# Patient Record
Sex: FEMALE | Race: WHITE | Employment: FULL TIME | URBAN - METROPOLITAN AREA
[De-identification: names, ages, dates, MRNs, and addresses within clinical notes are randomized per-mention and may not be internally consistent; named-entity substitution may affect disease eponyms.]

---

## 2017-01-02 DIAGNOSIS — F43.21 ADJUSTMENT DISORDER WITH DEPRESSED MOOD: ICD-10-CM

## 2017-01-03 RX ORDER — FLUOXETINE HYDROCHLORIDE 40 MG/1
CAPSULE ORAL
Qty: 90 CAPSULE | Refills: 0 | Status: SHIPPED | OUTPATIENT
Start: 2017-01-03 | End: 2017-05-24 | Stop reason: SDUPTHER

## 2017-04-18 ENCOUNTER — EMPLOYEE WELLNESS (OUTPATIENT)
Dept: OTHER | Age: 57
End: 2017-04-18

## 2017-04-18 LAB
CHOLESTEROL, TOTAL: 210 MG/DL (ref 0–199)
FASTING: YES
GLUCOSE BLD-MCNC: 122 MG/DL (ref 74–109)
HDLC SERPL-MCNC: 71 MG/DL (ref 40–90)
LDL CHOLESTEROL CALCULATED: 123 MG/DL
TRIGL SERPL-MCNC: 80 MG/DL (ref 0–199)

## 2017-05-11 DIAGNOSIS — F43.21 ADJUSTMENT DISORDER WITH DEPRESSED MOOD: ICD-10-CM

## 2017-05-11 RX ORDER — FLUOXETINE HYDROCHLORIDE 40 MG/1
CAPSULE ORAL
Qty: 90 CAPSULE | Refills: 0 | OUTPATIENT
Start: 2017-05-11

## 2017-05-24 RX ORDER — FLUOXETINE HYDROCHLORIDE 40 MG/1
CAPSULE ORAL
Qty: 30 CAPSULE | Refills: 0 | Status: SHIPPED | OUTPATIENT
Start: 2017-05-24 | End: 2017-05-31 | Stop reason: SDUPTHER

## 2017-05-31 ENCOUNTER — TELEPHONE (OUTPATIENT)
Dept: FAMILY MEDICINE CLINIC | Age: 57
End: 2017-05-31

## 2017-05-31 ENCOUNTER — OFFICE VISIT (OUTPATIENT)
Dept: FAMILY MEDICINE CLINIC | Age: 57
End: 2017-05-31

## 2017-05-31 VITALS
HEART RATE: 104 BPM | BODY MASS INDEX: 37.56 KG/M2 | DIASTOLIC BLOOD PRESSURE: 64 MMHG | WEIGHT: 220 LBS | RESPIRATION RATE: 16 BRPM | SYSTOLIC BLOOD PRESSURE: 116 MMHG | HEIGHT: 64 IN

## 2017-05-31 DIAGNOSIS — M75.40 IMPINGEMENT SYNDROME, SHOULDER, UNSPECIFIED LATERALITY: ICD-10-CM

## 2017-05-31 DIAGNOSIS — R73.01 IFG (IMPAIRED FASTING GLUCOSE): ICD-10-CM

## 2017-05-31 DIAGNOSIS — K21.9 GASTROESOPHAGEAL REFLUX DISEASE, ESOPHAGITIS PRESENCE NOT SPECIFIED: ICD-10-CM

## 2017-05-31 DIAGNOSIS — M25.512 BILATERAL SHOULDER PAIN, UNSPECIFIED CHRONICITY: Primary | ICD-10-CM

## 2017-05-31 DIAGNOSIS — M25.511 BILATERAL SHOULDER PAIN, UNSPECIFIED CHRONICITY: Primary | ICD-10-CM

## 2017-05-31 DIAGNOSIS — R06.2 WHEEZING: ICD-10-CM

## 2017-05-31 DIAGNOSIS — G47.33 OSA (OBSTRUCTIVE SLEEP APNEA): ICD-10-CM

## 2017-05-31 DIAGNOSIS — F43.21 ADJUSTMENT DISORDER WITH DEPRESSED MOOD: ICD-10-CM

## 2017-05-31 DIAGNOSIS — K21.9 GASTROESOPHAGEAL REFLUX DISEASE WITHOUT ESOPHAGITIS: ICD-10-CM

## 2017-05-31 LAB — HBA1C MFR BLD: 5.9 %

## 2017-05-31 PROCEDURE — 83036 HEMOGLOBIN GLYCOSYLATED A1C: CPT | Performed by: FAMILY MEDICINE

## 2017-05-31 PROCEDURE — 99214 OFFICE O/P EST MOD 30 MIN: CPT | Performed by: FAMILY MEDICINE

## 2017-05-31 RX ORDER — FLUOXETINE HYDROCHLORIDE 40 MG/1
CAPSULE ORAL
Qty: 90 CAPSULE | Refills: 3 | Status: SHIPPED | OUTPATIENT
Start: 2017-05-31 | End: 2018-05-30 | Stop reason: SDUPTHER

## 2017-05-31 RX ORDER — OMEPRAZOLE 40 MG/1
CAPSULE, DELAYED RELEASE ORAL
Qty: 90 CAPSULE | Refills: 3 | Status: SHIPPED | OUTPATIENT
Start: 2017-05-31 | End: 2020-09-18 | Stop reason: ALTCHOICE

## 2017-05-31 ASSESSMENT — PATIENT HEALTH QUESTIONNAIRE - PHQ9
2. FEELING DOWN, DEPRESSED OR HOPELESS: 1
SUM OF ALL RESPONSES TO PHQ9 QUESTIONS 1 & 2: 1
1. LITTLE INTEREST OR PLEASURE IN DOING THINGS: 0
SUM OF ALL RESPONSES TO PHQ QUESTIONS 1-9: 1

## 2017-05-31 ASSESSMENT — ENCOUNTER SYMPTOMS
WHEEZING: 1
GASTROINTESTINAL NEGATIVE: 1

## 2017-07-17 ENCOUNTER — HOSPITAL ENCOUNTER (EMERGENCY)
Age: 57
Discharge: HOME | End: 2017-07-17
Payer: COMMERCIAL

## 2017-07-17 ENCOUNTER — APPOINTMENT (OUTPATIENT)
Dept: GENERAL RADIOLOGY | Age: 57
End: 2017-07-17

## 2017-07-17 VITALS
RESPIRATION RATE: 20 BRPM | TEMPERATURE: 97.8 F | SYSTOLIC BLOOD PRESSURE: 159 MMHG | OXYGEN SATURATION: 98 % | HEART RATE: 90 BPM | DIASTOLIC BLOOD PRESSURE: 88 MMHG
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
## 2017-07-17 PROCEDURE — 99283 EMERGENCY DEPT VISIT LOW MDM: CPT

## 2017-07-17 PROCEDURE — 71101 X-RAY EXAM UNILAT RIBS/CHEST: CPT

## 2017-07-17 ASSESSMENT — PAIN DESCRIPTION - PAIN TYPE: TYPE: ACUTE PAIN

## 2017-07-17 ASSESSMENT — PAIN DESCRIPTION - ORIENTATION: ORIENTATION: RIGHT

## 2017-07-17 ASSESSMENT — PAIN DESCRIPTION - FREQUENCY: FREQUENCY: CONTINUOUS

## 2017-07-17 ASSESSMENT — PAIN DESCRIPTION - LOCATION: LOCATION: RIB CAGE

## 2017-07-17 ASSESSMENT — PAIN SCALES - GENERAL: PAINLEVEL_OUTOF10: 5

## 2017-07-18 ENCOUNTER — CARE COORDINATION (OUTPATIENT)
Dept: FAMILY MEDICINE CLINIC | Age: 57
End: 2017-07-18

## 2017-07-18 ASSESSMENT — ENCOUNTER SYMPTOMS
VOMITING: 0
RHINORRHEA: 0
BACK PAIN: 0
ABDOMINAL PAIN: 0
WHEEZING: 0
SHORTNESS OF BREATH: 0
NAUSEA: 0
COUGH: 0
DIARRHEA: 0
EYE DISCHARGE: 0
SORE THROAT: 0
EYE PAIN: 0

## 2017-07-19 ENCOUNTER — HOSPITAL ENCOUNTER (OUTPATIENT)
Dept: OCCUPATIONAL THERAPY | Age: 57
Setting detail: THERAPIES SERIES
Discharge: HOME | End: 2017-07-19
Payer: COMMERCIAL

## 2017-07-19 PROCEDURE — 97110 THERAPEUTIC EXERCISES: CPT

## 2017-07-19 PROCEDURE — 97035 APP MDLTY 1+ULTRASOUND EA 15: CPT

## 2017-07-19 ASSESSMENT — PAIN DESCRIPTION - LOCATION: LOCATION: SHOULDER

## 2017-07-19 ASSESSMENT — PAIN DESCRIPTION - ORIENTATION: ORIENTATION: RIGHT;LEFT

## 2017-07-19 ASSESSMENT — PAIN SCALES - GENERAL: PAINLEVEL_OUTOF10: 4

## 2017-07-21 ENCOUNTER — HOSPITAL ENCOUNTER (OUTPATIENT)
Dept: OCCUPATIONAL THERAPY | Age: 57
Setting detail: THERAPIES SERIES
Discharge: HOME | End: 2017-07-21
Payer: COMMERCIAL

## 2017-07-21 PROCEDURE — 97035 APP MDLTY 1+ULTRASOUND EA 15: CPT

## 2017-07-21 PROCEDURE — 97110 THERAPEUTIC EXERCISES: CPT

## 2017-07-21 ASSESSMENT — PAIN DESCRIPTION - LOCATION: LOCATION: SHOULDER

## 2017-07-21 ASSESSMENT — PAIN DESCRIPTION - ORIENTATION: ORIENTATION: RIGHT;LEFT

## 2017-07-21 ASSESSMENT — PAIN SCALES - GENERAL: PAINLEVEL_OUTOF10: 2

## 2017-07-26 ENCOUNTER — HOSPITAL ENCOUNTER (OUTPATIENT)
Dept: OCCUPATIONAL THERAPY | Age: 57
Setting detail: THERAPIES SERIES
End: 2017-07-26
Payer: COMMERCIAL

## 2017-07-28 ENCOUNTER — HOSPITAL ENCOUNTER (OUTPATIENT)
Dept: OCCUPATIONAL THERAPY | Age: 57
Setting detail: THERAPIES SERIES
Discharge: HOME | End: 2017-07-28
Payer: COMMERCIAL

## 2017-07-28 PROCEDURE — 97110 THERAPEUTIC EXERCISES: CPT

## 2017-07-28 PROCEDURE — 97035 APP MDLTY 1+ULTRASOUND EA 15: CPT

## 2017-07-28 ASSESSMENT — PAIN DESCRIPTION - LOCATION: LOCATION: SHOULDER

## 2017-07-28 ASSESSMENT — PAIN SCALES - GENERAL: PAINLEVEL_OUTOF10: 2

## 2017-07-28 ASSESSMENT — PAIN DESCRIPTION - ORIENTATION: ORIENTATION: LEFT

## 2017-08-30 ENCOUNTER — HOSPITAL ENCOUNTER (OUTPATIENT)
Dept: OCCUPATIONAL THERAPY | Age: 57
Setting detail: THERAPIES SERIES
Discharge: HOME | End: 2017-08-30
Payer: COMMERCIAL

## 2017-08-30 PROCEDURE — 97110 THERAPEUTIC EXERCISES: CPT

## 2017-09-01 ENCOUNTER — HOSPITAL ENCOUNTER (OUTPATIENT)
Dept: OCCUPATIONAL THERAPY | Age: 57
Discharge: HOME | End: 2017-09-01

## 2018-03-20 VITALS — WEIGHT: 219 LBS | BODY MASS INDEX: 36.44 KG/M2

## 2018-04-03 ENCOUNTER — EMPLOYEE WELLNESS (OUTPATIENT)
Dept: OTHER | Age: 58
End: 2018-04-03

## 2018-04-03 LAB
CHOLESTEROL, TOTAL: 198 MG/DL (ref 0–199)
FASTING: YES
GLUCOSE BLD-MCNC: 128 MG/DL (ref 74–109)
HBA1C MFR BLD: 5.9 % (ref 4.4–6.4)
HDLC SERPL-MCNC: 69 MG/DL (ref 40–90)
LDL CHOLESTEROL CALCULATED: 107 MG/DL
TRIGL SERPL-MCNC: 108 MG/DL (ref 0–199)

## 2018-04-10 VITALS — BODY MASS INDEX: 38.49 KG/M2 | WEIGHT: 226 LBS

## 2018-04-25 ENCOUNTER — OFFICE VISIT (OUTPATIENT)
Dept: FAMILY MEDICINE CLINIC | Age: 58
End: 2018-04-25
Payer: COMMERCIAL

## 2018-04-25 VITALS
SYSTOLIC BLOOD PRESSURE: 100 MMHG | BODY MASS INDEX: 39.27 KG/M2 | OXYGEN SATURATION: 96 % | DIASTOLIC BLOOD PRESSURE: 66 MMHG | HEIGHT: 64 IN | WEIGHT: 230 LBS | HEART RATE: 88 BPM | RESPIRATION RATE: 16 BRPM

## 2018-04-25 DIAGNOSIS — M53.3 SACROILIAC PAIN: Primary | ICD-10-CM

## 2018-04-25 DIAGNOSIS — M25.551 BILATERAL HIP PAIN: ICD-10-CM

## 2018-04-25 DIAGNOSIS — M25.552 BILATERAL HIP PAIN: ICD-10-CM

## 2018-04-25 DIAGNOSIS — G89.29 CHRONIC PAIN OF BOTH KNEES: ICD-10-CM

## 2018-04-25 DIAGNOSIS — M25.562 CHRONIC PAIN OF BOTH KNEES: ICD-10-CM

## 2018-04-25 DIAGNOSIS — M25.561 CHRONIC PAIN OF BOTH KNEES: ICD-10-CM

## 2018-04-25 DIAGNOSIS — G57.00 PIRIFORMIS SYNDROME, UNSPECIFIED LATERALITY: ICD-10-CM

## 2018-04-25 PROCEDURE — G8427 DOCREV CUR MEDS BY ELIG CLIN: HCPCS | Performed by: FAMILY MEDICINE

## 2018-04-25 PROCEDURE — 3017F COLORECTAL CA SCREEN DOC REV: CPT | Performed by: FAMILY MEDICINE

## 2018-04-25 PROCEDURE — G8417 CALC BMI ABV UP PARAM F/U: HCPCS | Performed by: FAMILY MEDICINE

## 2018-04-25 PROCEDURE — 1036F TOBACCO NON-USER: CPT | Performed by: FAMILY MEDICINE

## 2018-04-25 PROCEDURE — 99214 OFFICE O/P EST MOD 30 MIN: CPT | Performed by: FAMILY MEDICINE

## 2018-04-25 RX ORDER — CELECOXIB 200 MG/1
200 CAPSULE ORAL DAILY
Qty: 30 CAPSULE | Refills: 1 | Status: SHIPPED | OUTPATIENT
Start: 2018-04-25 | End: 2018-05-30 | Stop reason: SDUPTHER

## 2018-04-25 ASSESSMENT — ENCOUNTER SYMPTOMS
BACK PAIN: 1
RESPIRATORY NEGATIVE: 1
GASTROINTESTINAL NEGATIVE: 1

## 2018-04-26 ENCOUNTER — HOSPITAL ENCOUNTER (OUTPATIENT)
Dept: GENERAL RADIOLOGY | Age: 58
Discharge: HOME | End: 2018-04-26
Payer: COMMERCIAL

## 2018-04-26 ENCOUNTER — HOSPITAL ENCOUNTER (OUTPATIENT)
Age: 58
Discharge: HOME | End: 2018-04-26
Payer: COMMERCIAL

## 2018-04-26 DIAGNOSIS — G89.29 CHRONIC PAIN OF BOTH KNEES: ICD-10-CM

## 2018-04-26 DIAGNOSIS — M25.561 CHRONIC PAIN OF BOTH KNEES: ICD-10-CM

## 2018-04-26 DIAGNOSIS — M25.562 CHRONIC PAIN OF BOTH KNEES: ICD-10-CM

## 2018-04-26 DIAGNOSIS — M53.3 SACROILIAC PAIN: ICD-10-CM

## 2018-04-26 PROCEDURE — 73565 X-RAY EXAM OF KNEES: CPT

## 2018-04-26 PROCEDURE — 72100 X-RAY EXAM L-S SPINE 2/3 VWS: CPT

## 2018-05-30 DIAGNOSIS — M25.562 CHRONIC PAIN OF BOTH KNEES: ICD-10-CM

## 2018-05-30 DIAGNOSIS — M25.561 CHRONIC PAIN OF BOTH KNEES: ICD-10-CM

## 2018-05-30 DIAGNOSIS — G89.29 CHRONIC PAIN OF BOTH KNEES: ICD-10-CM

## 2018-05-30 DIAGNOSIS — F43.21 ADJUSTMENT DISORDER WITH DEPRESSED MOOD: ICD-10-CM

## 2018-05-30 RX ORDER — CELECOXIB 200 MG/1
200 CAPSULE ORAL DAILY
Qty: 90 CAPSULE | Refills: 3 | Status: SHIPPED | OUTPATIENT
Start: 2018-05-30 | End: 2019-08-08 | Stop reason: SDUPTHER

## 2018-05-30 RX ORDER — FLUOXETINE HYDROCHLORIDE 40 MG/1
CAPSULE ORAL
Qty: 90 CAPSULE | Refills: 3 | Status: SHIPPED | OUTPATIENT
Start: 2018-05-30 | End: 2019-11-18 | Stop reason: SDUPTHER

## 2018-06-06 ENCOUNTER — HOSPITAL ENCOUNTER (OUTPATIENT)
Dept: PHYSICAL THERAPY | Age: 58
Setting detail: THERAPIES SERIES
Discharge: HOME | End: 2018-06-06
Payer: COMMERCIAL

## 2018-06-06 PROCEDURE — 97162 PT EVAL MOD COMPLEX 30 MIN: CPT

## 2018-06-06 ASSESSMENT — PAIN DESCRIPTION - LOCATION: LOCATION: BACK;KNEE;HIP

## 2018-06-06 ASSESSMENT — PAIN DESCRIPTION - ORIENTATION: ORIENTATION: RIGHT;LEFT

## 2018-06-06 ASSESSMENT — PAIN SCALES - GENERAL: PAINLEVEL_OUTOF10: 4

## 2018-06-13 ENCOUNTER — HOSPITAL ENCOUNTER (OUTPATIENT)
Dept: PHYSICAL THERAPY | Age: 58
Setting detail: THERAPIES SERIES
Discharge: HOME | End: 2018-06-13
Payer: COMMERCIAL

## 2018-06-13 PROCEDURE — 97110 THERAPEUTIC EXERCISES: CPT

## 2018-06-13 ASSESSMENT — PAIN DESCRIPTION - LOCATION: LOCATION: BACK

## 2018-06-13 ASSESSMENT — PAIN SCALES - GENERAL: PAINLEVEL_OUTOF10: 3

## 2018-06-13 ASSESSMENT — PAIN DESCRIPTION - PAIN TYPE: TYPE: CHRONIC PAIN

## 2018-06-15 ENCOUNTER — HOSPITAL ENCOUNTER (OUTPATIENT)
Dept: PHYSICAL THERAPY | Age: 58
Setting detail: THERAPIES SERIES
Discharge: HOME | End: 2018-06-15
Payer: COMMERCIAL

## 2018-06-15 PROCEDURE — 97110 THERAPEUTIC EXERCISES: CPT

## 2018-06-15 ASSESSMENT — PAIN SCALES - GENERAL: PAINLEVEL_OUTOF10: 2

## 2018-06-15 ASSESSMENT — PAIN DESCRIPTION - ORIENTATION: ORIENTATION: RIGHT;LEFT

## 2018-06-15 ASSESSMENT — PAIN DESCRIPTION - LOCATION: LOCATION: KNEE

## 2018-06-15 ASSESSMENT — PAIN DESCRIPTION - PAIN TYPE: TYPE: CHRONIC PAIN

## 2018-06-20 ENCOUNTER — HOSPITAL ENCOUNTER (OUTPATIENT)
Dept: PHYSICAL THERAPY | Age: 58
Setting detail: THERAPIES SERIES
Discharge: HOME | End: 2018-06-20
Payer: COMMERCIAL

## 2018-06-20 PROCEDURE — 97110 THERAPEUTIC EXERCISES: CPT

## 2018-06-20 ASSESSMENT — PAIN SCALES - GENERAL: PAINLEVEL_OUTOF10: 2

## 2018-06-22 ENCOUNTER — HOSPITAL ENCOUNTER (OUTPATIENT)
Dept: PHYSICAL THERAPY | Age: 58
Setting detail: THERAPIES SERIES
Discharge: HOME | End: 2018-06-22
Payer: COMMERCIAL

## 2018-06-27 ENCOUNTER — HOSPITAL ENCOUNTER (OUTPATIENT)
Dept: PHYSICAL THERAPY | Age: 58
Setting detail: THERAPIES SERIES
Discharge: HOME | End: 2018-06-27
Payer: COMMERCIAL

## 2018-06-27 PROCEDURE — 97110 THERAPEUTIC EXERCISES: CPT

## 2018-06-29 ENCOUNTER — HOSPITAL ENCOUNTER (OUTPATIENT)
Dept: PHYSICAL THERAPY | Age: 58
Setting detail: THERAPIES SERIES
Discharge: HOME | End: 2018-06-29
Payer: COMMERCIAL

## 2018-06-29 PROCEDURE — 97110 THERAPEUTIC EXERCISES: CPT

## 2018-07-06 ENCOUNTER — HOSPITAL ENCOUNTER (OUTPATIENT)
Dept: PHYSICAL THERAPY | Age: 58
Setting detail: THERAPIES SERIES
Discharge: HOME | End: 2018-07-06
Payer: COMMERCIAL

## 2018-07-06 PROCEDURE — 97110 THERAPEUTIC EXERCISES: CPT

## 2018-07-06 ASSESSMENT — PAIN DESCRIPTION - LOCATION: LOCATION: GENERALIZED

## 2018-07-06 ASSESSMENT — PAIN SCALES - GENERAL: PAINLEVEL_OUTOF10: 1

## 2018-07-06 ASSESSMENT — PAIN DESCRIPTION - PAIN TYPE: TYPE: CHRONIC PAIN

## 2018-07-06 NOTE — PROGRESS NOTES
with abdominal bracing. Exercise 10: Rockerboard forward/back, left/right x15 each; balance 15 seconds   Exercise 11: HydroChest level 3 x15  Exercise 12: Lunges onto BOSU forward x10 bilateral with bracing. Exercise 13: added tandem standing on blue foam for 15 sec. X2  Exercise 14: standing HS stretch at steps held 15 sec. X3, quad stretch at steps held 15 sec. X3         Activity Tolerance:  Activity Tolerance: Patient Tolerated treatment well    Assessment: Body structures, Functions, Activity limitations: Decreased functional mobility , Decreased ADL status, Decreased ROM, Decreased strength, Decreased endurance  Assessment: Added advance balance activities this date with good tolerance. Pt continues to work on posture during all ex. this date. Pt educated to monitor for any increase in pain after session. Prognosis: Good  Discharge Recommendations: Continue to assess pending progress    Patient Education:  Patient Education: balance, abdominal bracing, posture, stretches                      Plan:  Times per week: 2-3 times per week  Plan weeks: 12 weeks  Specific instructions for Next Treatment: Core, hips, legs stretches and strengthening, manual therapy as needed, modalities as needed, balance, ambulation  Current Treatment Recommendations: Strengthening, ROM, Balance Training, Endurance Training, Stair training, Pain Management, Positioning, Modalities, Aquatics, Gait Training, Transfer Training, Home Exercise Program, Functional Mobility Training  Plan Comment: Continue with current POC. Goals:  Patient goals : \"I want my knees to feel better. I want to be able to do more things with my grandchildren. \"    Short term goals  Time Frame for Short term goals: 4 weeks  Short term goal 1: Improve Nathalie Lipps to 15/24 or less to assist with self care. Short term goal 2: Decrease pain in back, knees, right hip to 2/10 to assist with sleeping at night.   Short term goal 3: Improve core and leg strength to 5/5 to assist with carrying grandchildren. Short term goal 4: Improve posture needing no cues for correction during therapy to assist with preventing injury. Short term goal 5: Increase bilateral knee ROM to 1-128 degrees to assist with ambulation. Long term goals  Time Frame for Long term goals : 12 weeks  Long term goal 1: Independent with HEP and with progression to assist with decreasing pain.            Becky Aguila

## 2018-07-11 ENCOUNTER — HOSPITAL ENCOUNTER (OUTPATIENT)
Dept: PHYSICAL THERAPY | Age: 58
Setting detail: THERAPIES SERIES
Discharge: HOME | End: 2018-07-11
Payer: COMMERCIAL

## 2018-07-11 PROCEDURE — 97110 THERAPEUTIC EXERCISES: CPT

## 2018-07-11 ASSESSMENT — PAIN DESCRIPTION - DIRECTION: RADIATING_TOWARDS: RIGHT POSTERIOR KNEE

## 2018-07-11 ASSESSMENT — PAIN DESCRIPTION - PAIN TYPE: TYPE: CHRONIC PAIN

## 2018-07-11 ASSESSMENT — PAIN SCALES - GENERAL: PAINLEVEL_OUTOF10: 1

## 2018-07-11 ASSESSMENT — PAIN DESCRIPTION - ORIENTATION: ORIENTATION: RIGHT

## 2018-07-11 ASSESSMENT — PAIN DESCRIPTION - LOCATION: LOCATION: KNEE

## 2018-07-11 NOTE — PROGRESS NOTES
assist with preventing injury. Short term goal 5: Increase bilateral knee ROM to 1-128 degrees to assist with ambulation. Long term goals  Time Frame for Long term goals : 12 weeks  Long term goal 1: Independent with HEP and with progression to assist with decreasing pain.       Augustine Membreno  PTA [id number]

## 2018-07-13 ENCOUNTER — HOSPITAL ENCOUNTER (OUTPATIENT)
Dept: PHYSICAL THERAPY | Age: 58
Setting detail: THERAPIES SERIES
Discharge: HOME | End: 2018-07-13
Payer: COMMERCIAL

## 2018-07-13 PROCEDURE — 97110 THERAPEUTIC EXERCISES: CPT

## 2018-07-13 NOTE — PROGRESS NOTES
[place]     Time In: 1400  Time Out: 1430  Minutes: 30  Timed Code Treatment Minutes: 30 Minutes     Date: 2018  Patient Name: Jeison Sharp,  Gender:  female        CSN: [id number]   : 1960  (60 y.o.)       Referring Practitioner: Ridge Patient, DO      Diagnosis: M53.3 (ICD-10-CM) - Sacroiliac pain, M25.551, M25.552 (ICD-10-CM) - Bilateral hip pain, M25.561, M25.562, G89.29 (ICD-10-CM) - Chronic pain of both knees, G57.00 (ICD-10-CM) - Piriformis syndrome, unspecified laterality  Treatment Diagnosis: Difficulty with ambulation, knee pain, Lumbago   Additional Pertinent Hx: Obesity, Arthritis                  General:  PT Visit Information  Onset Date: 18  PT Insurance Information: MEDICAL [facility] visits. Aquatics and modalities are covered. Total # of Visits to Date: 9  Progress Note Counter:  for PN               Subjective:  Response To Previous Treatment: Patient with no complaints from previous session. Family / Caregiver Present: No  Comments: Follow up  with doctor as needed     Subjective: Patient reports feeling pretty good. Reports feels about 80% back to normal.       Pain:  Patient Currently in Pain: No         Objective    Other Activities  Other Activities: Re-evaluation performed          [address] [place] [address] [facility] for Low Back Pain   I stay at home most of the time because of the pain in my back. No   I change position frequently to try and make my back comfortable. No   I walk more slowly than usual because of the pain in my back. No   Because of the pain in my back, I am not doing any of the jobs that I usually do around the house. No   Because of the pain in my back, I use a handrail to get upstairs. Yes   Because of the pain in my back, I lie down to rest more often.  No   Because of the pain in my back, I have to hold onto something to get out of a reclining chair. No   Because of the pain in my back, I ask other people to do things for me. No   I get dressed more slowly than usual because of the pain in my back. Yes   I only stand up for short periods of time because of the pain in my back. No   Because of the pain in my back, I try not to bend or kneel down. No   I find it difficult to get out of a chair because of the pain in my back. No   My back hurts most of the time. No   I find it difficult to turn over in bed because of the pain in my back. No   My appetite is not very good because of the pain in my back. No   I have trouble putting on my socks (or stockings) because of the pain in my back. No   I only walk short distances because of the pain in my back. No   I sleep less because of the pain in my back. No   Because of the pain in my back, I get dressed with help from someone else. No   I sit down most of the day because of the pain in my back. No   I avoid heavy jobs around the house because of the pain in my back. No   Because of the pain in my back, I am more irritable and bad tempered with people. No   Because of the pain in my back, I go upstairs more slowly than usual. No   I stay in bed most of the time because of the pain in my back. No   TOTAL NUMBER OF YES RESPONSES 2/24             Activity Tolerance:  Activity Tolerance: Patient Tolerated treatment well    Assessment:  Assessment: Reassessment completed today. Patient is having less pain, improved Praful Inderjit score, increased strength and ROM, and improved posture. Patient in agreement to be discharged at this time and is to continue with HEP.     Prognosis: Good  REQUIRES PT FOLLOW UP: No  Discharge Recommendations: Home independently    Patient Education:  Patient Education: Continue with HEP                      Plan:  Specific instructions for Next Treatment: Discharge  Plan Comment: Patient is being discharged from therapy and is to continue with HEP    Goals:  Patient goals : \"I

## 2019-03-13 ENCOUNTER — EMPLOYEE WELLNESS (OUTPATIENT)
Dept: OTHER | Age: 59
End: 2019-03-13

## 2019-03-13 LAB
CHOLESTEROL, TOTAL: 182 MG/DL (ref 0–199)
FASTING: YES
GLUCOSE BLD-MCNC: 105 MG/DL (ref 74–109)
HDLC SERPL-MCNC: 63 MG/DL (ref 40–90)
LDL CHOLESTEROL CALCULATED: 104 MG/DL
TRIGL SERPL-MCNC: 76 MG/DL (ref 0–199)

## 2019-04-08 VITALS — BODY MASS INDEX: 36.73 KG/M2 | WEIGHT: 214 LBS

## 2019-08-08 ENCOUNTER — OFFICE VISIT (OUTPATIENT)
Dept: FAMILY MEDICINE CLINIC | Age: 59
End: 2019-08-08
Payer: COMMERCIAL

## 2019-08-08 VITALS
HEART RATE: 98 BPM | SYSTOLIC BLOOD PRESSURE: 120 MMHG | BODY MASS INDEX: 38.58 KG/M2 | DIASTOLIC BLOOD PRESSURE: 78 MMHG | RESPIRATION RATE: 14 BRPM | OXYGEN SATURATION: 98 % | WEIGHT: 226 LBS | HEIGHT: 64 IN

## 2019-08-08 DIAGNOSIS — Z82.61 FAMILY HISTORY OF RHEUMATOID ARTHRITIS: ICD-10-CM

## 2019-08-08 DIAGNOSIS — M25.561 ARTHRALGIA OF BOTH KNEES: ICD-10-CM

## 2019-08-08 DIAGNOSIS — M25.542 ARTHRALGIA OF BOTH HANDS: Primary | ICD-10-CM

## 2019-08-08 DIAGNOSIS — Z12.11 SCREENING FOR COLORECTAL CANCER: ICD-10-CM

## 2019-08-08 DIAGNOSIS — Z13.31 POSITIVE DEPRESSION SCREENING: ICD-10-CM

## 2019-08-08 DIAGNOSIS — Z11.59 NEED FOR HEPATITIS C SCREENING TEST: ICD-10-CM

## 2019-08-08 DIAGNOSIS — F51.02 ADJUSTMENT INSOMNIA: ICD-10-CM

## 2019-08-08 DIAGNOSIS — M25.561 CHRONIC PAIN OF BOTH KNEES: ICD-10-CM

## 2019-08-08 DIAGNOSIS — G89.29 CHRONIC PAIN OF BOTH KNEES: ICD-10-CM

## 2019-08-08 DIAGNOSIS — M25.562 ARTHRALGIA OF BOTH KNEES: ICD-10-CM

## 2019-08-08 DIAGNOSIS — Z12.31 ENCOUNTER FOR SCREENING MAMMOGRAM FOR BREAST CANCER: ICD-10-CM

## 2019-08-08 DIAGNOSIS — M25.541 ARTHRALGIA OF BOTH HANDS: Primary | ICD-10-CM

## 2019-08-08 DIAGNOSIS — Z12.12 SCREENING FOR COLORECTAL CANCER: ICD-10-CM

## 2019-08-08 DIAGNOSIS — M25.562 CHRONIC PAIN OF BOTH KNEES: ICD-10-CM

## 2019-08-08 PROCEDURE — 99213 OFFICE O/P EST LOW 20 MIN: CPT | Performed by: NURSE PRACTITIONER

## 2019-08-08 PROCEDURE — G8431 POS CLIN DEPRES SCRN F/U DOC: HCPCS | Performed by: NURSE PRACTITIONER

## 2019-08-08 PROCEDURE — G0444 DEPRESSION SCREEN ANNUAL: HCPCS | Performed by: NURSE PRACTITIONER

## 2019-08-08 RX ORDER — CELECOXIB 200 MG/1
200 CAPSULE ORAL 2 TIMES DAILY
Qty: 180 CAPSULE | Refills: 0 | Status: SHIPPED | OUTPATIENT
Start: 2019-08-08 | End: 2019-11-18 | Stop reason: SDUPTHER

## 2019-08-08 RX ORDER — HYDROXYZINE HYDROCHLORIDE 25 MG/1
25 TABLET, FILM COATED ORAL EVERY 8 HOURS PRN
Qty: 30 TABLET | Refills: 1 | Status: SHIPPED | OUTPATIENT
Start: 2019-08-08 | End: 2019-08-18

## 2019-08-08 ASSESSMENT — PATIENT HEALTH QUESTIONNAIRE - PHQ9
4. FEELING TIRED OR HAVING LITTLE ENERGY: 3
10. IF YOU CHECKED OFF ANY PROBLEMS, HOW DIFFICULT HAVE THESE PROBLEMS MADE IT FOR YOU TO DO YOUR WORK, TAKE CARE OF THINGS AT HOME, OR GET ALONG WITH OTHER PEOPLE: 1
5. POOR APPETITE OR OVEREATING: 0
SUM OF ALL RESPONSES TO PHQ9 QUESTIONS 1 & 2: 6
2. FEELING DOWN, DEPRESSED OR HOPELESS: 3
8. MOVING OR SPEAKING SO SLOWLY THAT OTHER PEOPLE COULD HAVE NOTICED. OR THE OPPOSITE, BEING SO FIGETY OR RESTLESS THAT YOU HAVE BEEN MOVING AROUND A LOT MORE THAN USUAL: 0
6. FEELING BAD ABOUT YOURSELF - OR THAT YOU ARE A FAILURE OR HAVE LET YOURSELF OR YOUR FAMILY DOWN: 3
9. THOUGHTS THAT YOU WOULD BE BETTER OFF DEAD, OR OF HURTING YOURSELF: 1
SUM OF ALL RESPONSES TO PHQ QUESTIONS 1-9: 19
7. TROUBLE CONCENTRATING ON THINGS, SUCH AS READING THE NEWSPAPER OR WATCHING TELEVISION: 3
3. TROUBLE FALLING OR STAYING ASLEEP: 3
SUM OF ALL RESPONSES TO PHQ QUESTIONS 1-9: 19
1. LITTLE INTEREST OR PLEASURE IN DOING THINGS: 3

## 2019-08-08 ASSESSMENT — ENCOUNTER SYMPTOMS
SHORTNESS OF BREATH: 0
NAUSEA: 0
ABDOMINAL PAIN: 0
COUGH: 0

## 2019-08-08 NOTE — PROGRESS NOTES
rash.   Neurological: Negative for dizziness, light-headedness and headaches. Psychiatric/Behavioral: Positive for dysphoric mood and sleep disturbance. Objective:   Physical Exam   Constitutional: Vital signs are normal. No distress. Eyes: Pupils are equal, round, and reactive to light. EOM are normal.   Neck: Normal range of motion. Neck supple. Cardiovascular: Normal rate and regular rhythm. No murmur heard. Pulmonary/Chest: Effort normal and breath sounds normal. She has no wheezes. Abdominal: Soft. Bowel sounds are normal. She exhibits no distension. There is no tenderness. Musculoskeletal:        Right shoulder: She exhibits decreased range of motion and tenderness. Left shoulder: She exhibits decreased range of motion and tenderness. Right knee: She exhibits decreased range of motion. Tenderness found. Left knee: She exhibits decreased range of motion. Tenderness found. Lumbar back: She exhibits decreased range of motion and pain. Right hand: She exhibits tenderness. Left hand: She exhibits tenderness. Skin: Skin is warm and dry. No rash noted. Psychiatric: She is slowed and withdrawn. She exhibits a depressed mood. Assessment:       Diagnosis Orders   1. Arthralgia of both hands  Sedimentation Rate    C-reactive protein    MARCELO    Rheumatoid Factor    CCP Antibodies, IGG/IGA    Janes Taylor MD, Rheumatology, [facility]   2. Arthralgia of both knees  Sedimentation Rate    C-reactive protein    MARCELO    Rheumatoid Factor    CCP Antibodies, IGG/IGA    Janes Taylor MD, Rheumatology, [facility]   3. Family history of rheumatoid arthritis  Janes Taylor MD, Rheumatology, [facility]   4. Positive depression screening  Positive Screen for Clinical Depression with a Documented Follow-up Plan    5. Adjustment insomnia  hydrOXYzine (ATARAX) 25 MG tablet   6. Chronic pain of both knees  celecoxib (CELEBREX) 200 MG capsule   7.  Screening for colorectal cancer  Cologuard (For External Results Only)   8. Encounter for screening mammogram for breast cancer  CASSANDRA Digital Screen Bilateral [[id number]]   9.  Need for hepatitis C screening test  Hepatitis C Antibody           Plan:      Arthritis Panel  Refer to RHEUM  Increase Celebrex BID  Discussion on grieving - hydroxyzine HS for sleep  Continue Prozac  MAMMO ordered  COLOGUARD ordered  RTO if symptoms worsen or stay the same          Con Alvarado, APRN - CNP

## 2019-10-11 ENCOUNTER — TELEPHONE (OUTPATIENT)
Dept: FAMILY MEDICINE CLINIC | Age: 59
End: 2019-10-11

## 2019-11-18 DIAGNOSIS — G89.29 CHRONIC PAIN OF BOTH KNEES: ICD-10-CM

## 2019-11-18 DIAGNOSIS — M25.562 CHRONIC PAIN OF BOTH KNEES: ICD-10-CM

## 2019-11-18 DIAGNOSIS — M25.561 CHRONIC PAIN OF BOTH KNEES: ICD-10-CM

## 2019-11-18 DIAGNOSIS — F43.21 ADJUSTMENT DISORDER WITH DEPRESSED MOOD: ICD-10-CM

## 2019-11-19 RX ORDER — CELECOXIB 200 MG/1
200 CAPSULE ORAL 2 TIMES DAILY
Qty: 180 CAPSULE | Refills: 0 | Status: SHIPPED | OUTPATIENT
Start: 2019-11-19 | End: 2020-03-16

## 2019-11-19 RX ORDER — FLUOXETINE HYDROCHLORIDE 40 MG/1
CAPSULE ORAL
Qty: 90 CAPSULE | Refills: 3 | Status: SHIPPED | OUTPATIENT
Start: 2019-11-19 | End: 2021-01-08

## 2019-11-24 ENCOUNTER — HOSPITAL ENCOUNTER (EMERGENCY)
Age: 59
Discharge: HOME | End: 2019-11-24
Payer: COMMERCIAL

## 2019-11-24 VITALS
BODY MASS INDEX: 33.8 KG/M2 | HEART RATE: 111 BPM | WEIGHT: 198 LBS | TEMPERATURE: 97.8 F | DIASTOLIC BLOOD PRESSURE: 110 MMHG | HEIGHT: 64 IN | SYSTOLIC BLOOD PRESSURE: 145 MMHG | RESPIRATION RATE: 20 BRPM | OXYGEN SATURATION: 98 %

## 2019-11-24 DIAGNOSIS — S81.812A LACERATION OF LEFT LOWER EXTREMITY, INITIAL ENCOUNTER: Primary | ICD-10-CM

## 2019-11-24 PROCEDURE — 12002 RPR S/N/AX/GEN/TRNK2.6-7.5CM: CPT

## 2019-11-24 PROCEDURE — 90471 IMMUNIZATION ADMIN: CPT | Performed by: NURSE PRACTITIONER

## 2019-11-24 PROCEDURE — 99282 EMERGENCY DEPT VISIT SF MDM: CPT

## 2019-11-24 PROCEDURE — 6360000002 HC RX W HCPCS: Performed by: NURSE PRACTITIONER

## 2019-11-24 PROCEDURE — 90715 TDAP VACCINE 7 YRS/> IM: CPT | Performed by: NURSE PRACTITIONER

## 2019-11-24 RX ORDER — LIDOCAINE HYDROCHLORIDE AND EPINEPHRINE 10; 10 MG/ML; UG/ML
INJECTION, SOLUTION INFILTRATION; PERINEURAL
Status: DISCONTINUED
Start: 2019-11-24 | End: 2019-11-25 | Stop reason: HOSPADM

## 2019-11-24 RX ORDER — CEPHALEXIN 500 MG/1
500 CAPSULE ORAL 2 TIMES DAILY
Qty: 14 CAPSULE | Refills: 0 | Status: SHIPPED | OUTPATIENT
Start: 2019-11-24 | End: 2019-12-01

## 2019-11-24 RX ADMIN — TETANUS TOXOID, REDUCED DIPHTHERIA TOXOID AND ACELLULAR PERTUSSIS VACCINE, ADSORBED 0.5 ML: 5; 2.5; 8; 8; 2.5 SUSPENSION INTRAMUSCULAR at 21:38

## 2019-11-24 ASSESSMENT — ENCOUNTER SYMPTOMS: COLOR CHANGE: 0

## 2019-11-25 ENCOUNTER — TELEPHONE (OUTPATIENT)
Dept: FAMILY MEDICINE CLINIC | Age: 59
End: 2019-11-25

## 2020-02-10 ENCOUNTER — HOSPITAL ENCOUNTER (OUTPATIENT)
Age: 60
Discharge: HOME | End: 2020-02-10
Payer: COMMERCIAL

## 2020-02-10 LAB
ALBUMIN SERPL-MCNC: 4.2 G/DL (ref 3.5–5.1)
ALP BLD-CCNC: 81 U/L (ref 38–126)
ALT SERPL-CCNC: 24 U/L (ref 11–66)
ANION GAP SERPL CALCULATED.3IONS-SCNC: 12 MEQ/L (ref 8–16)
AST SERPL-CCNC: 24 U/L (ref 5–40)
BASOPHILS # BLD: 0.6 %
BASOPHILS ABSOLUTE: 0 THOU/MM3 (ref 0–0.1)
BILIRUB SERPL-MCNC: 0.7 MG/DL (ref 0.3–1.2)
BUN BLDV-MCNC: 14 MG/DL (ref 7–22)
C-REACTIVE PROTEIN: 0.73 MG/DL (ref 0–1)
CALCIUM SERPL-MCNC: 9.1 MG/DL (ref 8.5–10.5)
CHLORIDE BLD-SCNC: 105 MEQ/L (ref 98–111)
CO2: 22 MEQ/L (ref 23–33)
CREAT SERPL-MCNC: 0.5 MG/DL (ref 0.4–1.2)
EOSINOPHIL # BLD: 3.9 %
EOSINOPHILS ABSOLUTE: 0.2 THOU/MM3 (ref 0–0.4)
ERYTHROCYTE [DISTWIDTH] IN BLOOD BY AUTOMATED COUNT: 13.5 % (ref 11.5–14.5)
ERYTHROCYTE [DISTWIDTH] IN BLOOD BY AUTOMATED COUNT: 42.3 FL (ref 35–45)
GFR SERPL CREATININE-BSD FRML MDRD: > 90 ML/MIN/1.73M2
GLUCOSE BLD-MCNC: 108 MG/DL (ref 70–108)
HCT VFR BLD CALC: 40.9 % (ref 37–47)
HEMOGLOBIN: 12.8 GM/DL (ref 12–16)
HEPATITIS C ANTIBODY: NEGATIVE
IMMATURE GRANS (ABS): 0.01 THOU/MM3 (ref 0–0.07)
IMMATURE GRANULOCYTES: 0.2 %
LYMPHOCYTES # BLD: 34.1 %
LYMPHOCYTES ABSOLUTE: 2.1 THOU/MM3 (ref 1–4.8)
MCH RBC QN AUTO: 26.9 PG (ref 26–33)
MCHC RBC AUTO-ENTMCNC: 31.3 GM/DL (ref 32.2–35.5)
MCV RBC AUTO: 85.9 FL (ref 81–99)
MONOCYTES # BLD: 9.5 %
MONOCYTES ABSOLUTE: 0.6 THOU/MM3 (ref 0.4–1.3)
NUCLEATED RED BLOOD CELLS: 0 /100 WBC
PLATELET # BLD: 276 THOU/MM3 (ref 130–400)
PMV BLD AUTO: 10.8 FL (ref 9.4–12.4)
POTASSIUM SERPL-SCNC: 4 MEQ/L (ref 3.5–5.2)
RBC # BLD: 4.76 MILL/MM3 (ref 4.2–5.4)
RHEUMATOID FACTOR: 11 IU/ML (ref 0–13)
SEDIMENTATION RATE, ERYTHROCYTE: 21 MM/HR (ref 0–20)
SEG NEUTROPHILS: 51.7 %
SEGMENTED NEUTROPHILS ABSOLUTE COUNT: 3.2 THOU/MM3 (ref 1.8–7.7)
SODIUM BLD-SCNC: 139 MEQ/L (ref 135–145)
TOTAL PROTEIN: 7.9 G/DL (ref 6.1–8)
URIC ACID: 5.5 MG/DL (ref 2.4–5.7)
VITAMIN D 25-HYDROXY: 18 NG/ML (ref 30–100)
WBC # BLD: 6.2 THOU/MM3 (ref 4.8–10.8)

## 2020-02-10 PROCEDURE — 86200 CCP ANTIBODY: CPT

## 2020-02-10 PROCEDURE — 84550 ASSAY OF BLOOD/URIC ACID: CPT

## 2020-02-10 PROCEDURE — 86038 ANTINUCLEAR ANTIBODIES: CPT

## 2020-02-10 PROCEDURE — 86803 HEPATITIS C AB TEST: CPT

## 2020-02-10 PROCEDURE — 36415 COLL VENOUS BLD VENIPUNCTURE: CPT

## 2020-02-10 PROCEDURE — 80053 COMPREHEN METABOLIC PANEL: CPT

## 2020-02-10 PROCEDURE — 86140 C-REACTIVE PROTEIN: CPT

## 2020-02-10 PROCEDURE — 86430 RHEUMATOID FACTOR TEST QUAL: CPT

## 2020-02-10 PROCEDURE — 85651 RBC SED RATE NONAUTOMATED: CPT

## 2020-02-10 PROCEDURE — 82306 VITAMIN D 25 HYDROXY: CPT

## 2020-02-10 PROCEDURE — 85025 COMPLETE CBC W/AUTO DIFF WBC: CPT

## 2020-02-11 LAB — CYCLIC CITRULLINATED PEPTIDE ANTIBODY IGG: < 1.5 U/ML

## 2020-02-12 LAB — ANA SCREEN: NORMAL

## 2020-03-16 RX ORDER — CELECOXIB 200 MG/1
CAPSULE ORAL
Qty: 180 CAPSULE | Refills: 0 | Status: SHIPPED | OUTPATIENT
Start: 2020-03-16 | End: 2020-07-06

## 2020-09-18 ENCOUNTER — HOSPITAL ENCOUNTER (EMERGENCY)
Age: 60
Discharge: HOME | End: 2020-09-18
Attending: EMERGENCY MEDICINE
Payer: COMMERCIAL

## 2020-09-18 VITALS
TEMPERATURE: 97.7 F | HEART RATE: 88 BPM | SYSTOLIC BLOOD PRESSURE: 168 MMHG | BODY MASS INDEX: 36.7 KG/M2 | OXYGEN SATURATION: 98 % | DIASTOLIC BLOOD PRESSURE: 84 MMHG | HEIGHT: 64 IN | WEIGHT: 215 LBS | RESPIRATION RATE: 18 BRPM

## 2020-09-18 PROCEDURE — 6360000002 HC RX W HCPCS: Performed by: EMERGENCY MEDICINE

## 2020-09-18 PROCEDURE — 99212 OFFICE O/P EST SF 10 MIN: CPT

## 2020-09-18 PROCEDURE — 99213 OFFICE O/P EST LOW 20 MIN: CPT | Performed by: EMERGENCY MEDICINE

## 2020-09-18 PROCEDURE — 96372 THER/PROPH/DIAG INJ SC/IM: CPT

## 2020-09-18 RX ORDER — METHYLPREDNISOLONE ACETATE 80 MG/ML
80 INJECTION, SUSPENSION INTRA-ARTICULAR; INTRALESIONAL; INTRAMUSCULAR; SOFT TISSUE ONCE
Status: COMPLETED | OUTPATIENT
Start: 2020-09-18 | End: 2020-09-18

## 2020-09-18 RX ORDER — HYDROXYZINE 50 MG/1
50 TABLET, FILM COATED ORAL 4 TIMES DAILY PRN
Qty: 15 TABLET | Refills: 0 | Status: SHIPPED | OUTPATIENT
Start: 2020-09-18 | End: 2021-01-08

## 2020-09-18 RX ORDER — PREDNISONE 20 MG/1
20 TABLET ORAL DAILY
Qty: 14 TABLET | Refills: 0 | Status: SHIPPED | OUTPATIENT
Start: 2020-09-18 | End: 2020-09-30

## 2020-09-18 RX ADMIN — METHYLPREDNISOLONE ACETATE 80 MG: 80 INJECTION, SUSPENSION INTRA-ARTICULAR; INTRALESIONAL; INTRAMUSCULAR; SOFT TISSUE at 18:12

## 2020-09-18 ASSESSMENT — ENCOUNTER SYMPTOMS
WHEEZING: 0
TROUBLE SWALLOWING: 0
VOICE CHANGE: 0
CONSTIPATION: 0
DIARRHEA: 0
BACK PAIN: 0
SORE THROAT: 0
EYE REDNESS: 0
ABDOMINAL PAIN: 0
EYE PAIN: 0
COUGH: 0
BLOOD IN STOOL: 0
CHOKING: 0
STRIDOR: 0
SHORTNESS OF BREATH: 0
FACIAL SWELLING: 0
EYE DISCHARGE: 0
VOMITING: 0
NAUSEA: 0
SINUS PRESSURE: 0

## 2020-09-18 NOTE — ED TRIAGE NOTES
Patient to room with c/o red, raised, itchy rash to bilateral arms, face, ears, and neck beginning yesterday. States possible exposure to poison ivy, while cutting weeds, two days ago.

## 2020-09-18 NOTE — ED PROVIDER NOTES
[phone]       Chief Complaint   Patient presents with    Rash     (B) arms, face       Nurses Notes reviewed and I agree except as noted in the HPI. HISTORY OF PRESENT ILLNESS   Misael Harris is a 63 y.o. female who presents with 24-hour history of itchy rash on face, extremities and trunk. Patient exposed to plant allergens in her yard. No fever, chest pain, shortness of breath, stridor, wheezing, abdominal pain, vomiting, painful or swollen glands, purulent discharge. No history of asthma or diabetes. Non-smoker  REVIEW OF SYSTEMS     Review of Systems   Constitutional: Negative for appetite change, chills, fatigue, fever and unexpected weight change. HENT: Negative for congestion, ear discharge, ear pain, facial swelling, hearing loss, nosebleeds, postnasal drip, sinus pressure, sore throat, trouble swallowing and voice change. Eyes: Negative for pain, discharge, redness and visual disturbance. Respiratory: Negative for cough, choking, shortness of breath, wheezing and stridor. Cardiovascular: Negative for chest pain and leg swelling. Gastrointestinal: Negative for abdominal pain, blood in stool, constipation, diarrhea, nausea and vomiting. Genitourinary: Negative for dysuria, flank pain, frequency, hematuria, urgency, vaginal bleeding and vaginal discharge. Musculoskeletal: Negative for arthralgias, back pain, neck pain and neck stiffness. Skin: Positive for rash. Itchy rash face trunk and extremities   Neurological: Negative for dizziness, seizures, syncope, weakness, light-headedness and headaches. Hematological: Negative for adenopathy. Does not bruise/bleed easily. Psychiatric/Behavioral: Negative for confusion, sleep disturbance and suicidal ideas. The patient is not nervous/anxious. All other systems reviewed and are negative.       PAST MEDICAL HISTORY         Diagnosis Date    GERD (gastroesophageal reflux disease) 2/5/2013    Obesity     Restless legs syndrome     Unspecified sleep apnea        SURGICAL HISTORY     Patient  has a past surgical history that includes Tubal ligation (); Foot surgery; and Endometrial ablation. CURRENT MEDICATIONS       Previous Medications    CELECOXIB (CELEBREX) 200 MG CAPSULE    TAKE 1 CAPSULE BY MOUTH TWICE A DAY    FLUOXETINE (PROZAC) 40 MG CAPSULE    TAKE 1 CAPSULE BY MOUTH DAILY. LORATADINE (CLARITIN) 10 MG TABLET    Take 1 tablet by mouth daily. MULTIPLE VITAMIN PO    Take 1 tablet by mouth daily. ALLERGIES     Patient is is allergic to sulfa antibiotics. FAMILY HISTORY     Patient'sfamily history includes Arthritis in her mother; Diabetes in her father; High Blood Pressure in her father; Stroke in her father and mother. SOCIAL HISTORY     Patient  reports that she has never smoked. She has never used smokeless tobacco. She reports current alcohol use. She reports that she does not use drugs. PHYSICAL EXAM     ED TRIAGE VITALS  BP: (!) 175/84, Temp: 97.7 °F (36.5 °C), Pulse: 94, Resp: 20, SpO2: 98 %  Physical Exam  Vitals signs and nursing note reviewed. Constitutional:       General: She is not in acute distress. Appearance: She is well-developed. She is not ill-appearing. Comments: Moist membranes, normal airway   HENT:      Head: Normocephalic and atraumatic. Right Ear: Tympanic membrane and external ear normal.      Left Ear: Tympanic membrane and external ear normal.      Nose: Nose normal.      Mouth/Throat:      Pharynx: No oropharyngeal exudate. Comments: Oropharynx normal  Eyes:      General: No scleral icterus. Right eye: No discharge. Left eye: No discharge. Extraocular Movements:      Right eye: Normal extraocular motion. Left eye: Normal extraocular motion. Conjunctiva/sclera: Conjunctivae normal.      Pupils: Pupils are equal, round, and reactive to light. Comments: Conjunctiva clear   Neck:      Musculoskeletal: Normal range of motion. No spinous process tenderness or muscular tenderness. Thyroid: No thyromegaly. Vascular: No JVD. Cardiovascular:      Rate and Rhythm: Normal rate and regular rhythm. Pulses: Normal pulses. Heart sounds: Normal heart sounds, S1 normal and S2 normal. No murmur. No friction rub. No gallop. Pulmonary:      Effort: Pulmonary effort is normal. No tachypnea or respiratory distress. Breath sounds: Normal breath sounds. No stridor. No wheezing or rales. Comments: No cough, lungs clear throughout  Chest:      Chest wall: No tenderness. Abdominal:      General: Bowel sounds are normal. There is no distension. Palpations: Abdomen is soft. There is no mass. Tenderness: There is no abdominal tenderness. There is no right CVA tenderness, left CVA tenderness, guarding or rebound. Comments: Soft nontender   Musculoskeletal: Normal range of motion. General: No tenderness. Right lower leg: Normal.      Left lower leg: Normal.   Lymphadenopathy:      Cervical: No cervical adenopathy. Right cervical: No superficial cervical adenopathy. Left cervical: No superficial cervical adenopathy. Skin:     General: Skin is warm and dry. Findings: No erythema or rash. Comments: Contact dermatitis face, neck, trunk, extremities without bacterial infection or infestation   Neurological:      Mental Status: She is alert and oriented to person, place, and time. Cranial Nerves: No cranial nerve deficit. Motor: No abnormal muscle tone. Coordination: Coordination normal.      Deep Tendon Reflexes: Reflexes are normal and symmetric. Reflexes normal.      Comments: Appropriate, no focal finding   Psychiatric:         Behavior: Behavior normal.         Thought Content:  Thought content normal.         Judgment: Judgment normal.         DIAGNOSTIC RESULTS   Labs: No results found for this visit on 09/18/20. IMAGING:  No orders to display     URGENT CARE COURSE:     Vitals:    09/18/20 1743   BP: (!) 175/84   Pulse: 94   Resp: 20   Temp: 97.7 °F (36.5 °C)   TempSrc: Temporal   SpO2: 98%   Weight: 215 lb (97.5 kg)   Height: 5' 4\" (1.626 m)       Medications   methylPREDNISolone acetate (DEPO-MEDROL) injection 80 mg (80 mg Intramuscular Given 9/18/20 1812)   Tolerated well  PROCEDURES:  None  FINALIMPRESSION      1. Allergic contact dermatitis due to plant    2. Elevated blood pressure reading        DISPOSITION/PLAN   DISPOSITION Decision To Discharge 09/18/2020 06:02:56 PM  Nontoxic, well-hydrated, normal airway. No anaphylactic or anaphylactoid reaction, angioedema, urticaria, SJS, TEN. No infectious process. No infestation. Patient has allergic contact dermatitis due to plant. Will require systemic and topical corticosteroids and antihistamines. Patient received Depo-Medrol. Will treat with prednisone, Valisone, Atarax, increased oral clear liquids, rest in cool air conditioned space. Patient to recheck with PCP in 7 days if problems persist, and she understands to go to ED if worse                       PATIENT REFERRED TO:  Alexandra Stephen [address] [place] [address]  [phone]    Schedule an appointment as soon as possible for a visit in 7 days  Recheck if problems persist, go to emergency if worse    DISCHARGE MEDICATIONS:  New Prescriptions    BETAMETHASONE VALERATE (VALISONE) 0.1 % CREAM    Apply topically 2 times daily.   Do not apply to face    HYDROXYZINE (ATARAX) 50 MG TABLET    Take 1 tablet by mouth 4 times daily as needed for Itching (rash swelling) Caution at work will cause drowsiness    PREDNISONE (DELTASONE) 20 MG TABLET    Take 1 tablet by mouth daily for 12 days 2 p.o. daily for 4 days, 1 p.o. daily for 4 days, one half p.o. daily for 4 days     Current Discharge Medication List          MD Radha Balbuena Post Destinee Guerra MD  09/18/20 6956

## 2020-09-21 ENCOUNTER — TELEPHONE (OUTPATIENT)
Dept: FAMILY MEDICINE CLINIC | Age: 60
End: 2020-09-21

## 2021-01-08 ENCOUNTER — HOSPITAL ENCOUNTER (OUTPATIENT)
Dept: WOMENS IMAGING | Age: 61
Discharge: HOME | End: 2021-01-08
Payer: COMMERCIAL

## 2021-01-08 ENCOUNTER — OFFICE VISIT (OUTPATIENT)
Dept: FAMILY MEDICINE CLINIC | Age: 61
End: 2021-01-08
Payer: COMMERCIAL

## 2021-01-08 VITALS
SYSTOLIC BLOOD PRESSURE: 136 MMHG | TEMPERATURE: 96.5 F | DIASTOLIC BLOOD PRESSURE: 84 MMHG | HEART RATE: 80 BPM | WEIGHT: 253.2 LBS | BODY MASS INDEX: 43.46 KG/M2 | RESPIRATION RATE: 16 BRPM

## 2021-01-08 DIAGNOSIS — Z12.11 ENCOUNTER FOR COLORECTAL CANCER SCREENING: ICD-10-CM

## 2021-01-08 DIAGNOSIS — E66.01 MORBID OBESITY WITH BMI OF 40.0-44.9, ADULT (HCC): ICD-10-CM

## 2021-01-08 DIAGNOSIS — Z92.89 HISTORY OF MAMMOGRAPHY, SCREENING: ICD-10-CM

## 2021-01-08 DIAGNOSIS — Z00.00 WELL ADULT EXAM: Primary | ICD-10-CM

## 2021-01-08 DIAGNOSIS — R06.02 SOB (SHORTNESS OF BREATH) ON EXERTION: ICD-10-CM

## 2021-01-08 DIAGNOSIS — Z77.22 HISTORY OF SECOND HAND SMOKE EXPOSURE: ICD-10-CM

## 2021-01-08 DIAGNOSIS — Z12.12 ENCOUNTER FOR COLORECTAL CANCER SCREENING: ICD-10-CM

## 2021-01-08 PROCEDURE — 77067 SCR MAMMO BI INCL CAD: CPT

## 2021-01-08 PROCEDURE — 99396 PREV VISIT EST AGE 40-64: CPT | Performed by: NURSE PRACTITIONER

## 2021-01-08 RX ORDER — FLUTICASONE FUROATE AND VILANTEROL TRIFENATATE 100; 25 UG/1; UG/1
1 POWDER RESPIRATORY (INHALATION) DAILY
Qty: 30 EACH | Refills: 0 | Status: SHIPPED | OUTPATIENT
Start: 2021-01-08 | End: 2021-02-03 | Stop reason: SDUPTHER

## 2021-01-08 SDOH — ECONOMIC STABILITY: FOOD INSECURITY: WITHIN THE PAST 12 MONTHS, YOU WORRIED THAT YOUR FOOD WOULD RUN OUT BEFORE YOU GOT MONEY TO BUY MORE.: NEVER TRUE

## 2021-01-08 SDOH — ECONOMIC STABILITY: FOOD INSECURITY: WITHIN THE PAST 12 MONTHS, THE FOOD YOU BOUGHT JUST DIDN'T LAST AND YOU DIDN'T HAVE MONEY TO GET MORE.: NEVER TRUE

## 2021-01-08 SDOH — ECONOMIC STABILITY: TRANSPORTATION INSECURITY
IN THE PAST 12 MONTHS, HAS THE LACK OF TRANSPORTATION KEPT YOU FROM MEDICAL APPOINTMENTS OR FROM GETTING MEDICATIONS?: NO

## 2021-01-08 ASSESSMENT — ENCOUNTER SYMPTOMS
ABDOMINAL PAIN: 0
COUGH: 1
SHORTNESS OF BREATH: 1
CHEST TIGHTNESS: 0
NAUSEA: 0
WHEEZING: 0

## 2021-01-08 ASSESSMENT — PATIENT HEALTH QUESTIONNAIRE - PHQ9
SUM OF ALL RESPONSES TO PHQ QUESTIONS 1-9: 2
2. FEELING DOWN, DEPRESSED OR HOPELESS: 1
SUM OF ALL RESPONSES TO PHQ QUESTIONS 1-9: 2

## 2021-01-08 NOTE — PROGRESS NOTES
276 02/10/2020         Health Maintenance   Topic Date Due    HIV screen  01/09/1975    Shingles Vaccine (2 of 3) 09/17/2013    Cervical cancer screen  02/02/2019    A1C test (Diabetic or Prediabetic)  04/03/2019    Breast cancer screen  04/25/2019    Lipid screen  03/13/2024    Colon cancer screen colonoscopy  02/02/2026    DTaP/Tdap/Td vaccine (2 - Td) 11/24/2029    Flu vaccine  Completed    Hepatitis C screen  Completed    Hepatitis A vaccine  Aged Out    Hepatitis B vaccine  Aged Out    Hib vaccine  Aged Out    Meningococcal (ACWY) vaccine  Aged Out    Pneumococcal 0-64 years Vaccine  Aged [employer] History   Administered Date(s) Administered    Influenza Virus Vaccine 10/05/2018, 11/15/2019, 10/12/2020    Tdap (Boostrix, Adacel) 11/24/2019    Zoster Live (Zostavax) 07/23/2013       Review of Systems   Constitutional: Negative for chills and fever. HENT: Negative. Respiratory: Positive for cough and shortness of breath. Negative for chest tightness and wheezing. Cardiovascular: Negative for chest pain. Gastrointestinal: Negative for abdominal pain and nausea. Skin: Negative for rash. Neurological: Negative for dizziness, light-headedness and headaches. Psychiatric/Behavioral: Negative. Objective:   Physical Exam  Constitutional:       General: She is not in acute distress. Eyes:      Pupils: Pupils are equal, round, and reactive to light. Neck:      Musculoskeletal: Normal range of motion and neck supple. Cardiovascular:      Rate and Rhythm: Normal rate and regular rhythm. Heart sounds: No murmur. Pulmonary:      Effort: Pulmonary effort is normal.      Breath sounds: Normal breath sounds. No wheezing. Abdominal:      General: Bowel sounds are normal. There is no distension. Palpations: Abdomen is soft. Tenderness: There is no abdominal tenderness. Musculoskeletal: Normal range of motion. General: No tenderness.    Skin: General: Skin is warm and dry. Findings: No rash. Assessment:       Diagnosis Orders   1. Well adult exam  CBC    Lipid Panel    Comprehensive Metabolic Panel    Hemoglobin A1C    TSH with Reflex   2. SOB (shortness of breath) on exertion  fluticasone-vilanterol (BREO ELLIPTA) 100-25 MCG/INH AEPB inhaler   3. History of second hand smoke exposure  fluticasone-vilanterol (BREO ELLIPTA) 100-25 MCG/INH AEPB inhaler   4. Morbid obesity with BMI of 40.0-44.9, adult ([facility] [address]     5. Encounter for colorectal cancer screening  Cologuard (For External Results Only)   6. History of mammography, screening  CASSANDRA DIGITAL SCREEN W OR WO CAD BILATERAL           Plan:      Screening labs  MAMMO and COLOGUARD  Shingles vaccine discussed  Symptoms appear Chronic Bronchitis - 2nd hand exposure   - low suspicion for cardiac related due to length of duration   - obesity also big culprit  Trial Breo Daily  Call with update in 1 maureen      Current Outpatient Medications   Medication Sig Dispense Refill    fluticasone-vilanterol (BREO ELLIPTA) 100-25 MCG/INH AEPB inhaler Inhale 1 puff into the lungs daily 30 each 0     No current facility-administered medications for this visit.

## 2021-01-08 NOTE — PATIENT INSTRUCTIONS
You may receive a survey about your visit with us today. The feedback from our patients helps us identify what is working well and where the service to all patients can be enhanced. Thank you! Patient Education        Learning About Chronic Bronchitis  What is chronic bronchitis? Chronic bronchitis is long-term swelling and the buildup of mucus in the airways of your lungs. The airways (bronchial tubes) get inflamed and make a lot of mucus. This can narrow or block the airways, making it hard for you to breathe. It is a form of COPD (chronic obstructive pulmonary disease). Chronic bronchitis is usually caused by smoking. But chemical fumes, dust, or air pollution also can cause it over time. What can you expect when you have chronic bronchitis? Chronic bronchitis gets worse over time. You cannot undo the damage to your lungs. Over time, you may find that:  · You get short of breath even when you do simple things like get dressed or fix a meal.  · It is hard to eat or exercise. · You lose weight and feel weaker. Over many years, the swelling and mucus from chronic bronchitis make it more likely that you will get lung infections. But there are things you can do to prevent more damage and feel better. What are the symptoms? The main symptoms of chronic bronchitis are:  · A cough that will not go away. · Mucus that comes up when you cough. · Shortness of breath that gets worse when you exercise. At times, your symptoms may suddenly flare up and get much worse. This is a called an exacerbation (say \"egg-MATT-er-BAY-shbipin\"). When this happens, your usual symptoms quickly get worse and stay bad. This can be dangerous. You may have to go to the hospital.  How can you keep chronic bronchitis from getting worse? Don't smoke. That is the best way to keep chronic bronchitis from getting worse. If you already smoke, it is never too late to stop.  If you need help quitting, talk to your doctor about stop-smoking programs and medicines. These can increase your chances of quitting for good. You can do other things to keep chronic bronchitis from getting worse:  · Avoid bad air. Air pollution, chemical fumes, and dust also can make chronic bronchitis worse. · Get a flu shot every year. A shot may keep the flu from turning into something more serious, like pneumonia. A flu shot also may lower your chances of having a flare-up. · Get a pneumococcal shot. A shot can prevent some of the serious complications of pneumonia. Ask your doctor how often you should get this shot. How is chronic bronchitis treated? Chronic bronchitis is treated with medicines and oxygen. You also can take steps at home to stay healthy and keep your condition from getting worse. Medicines and oxygen therapy  · You may be taking medicines such as:  ? Bronchodilators. These help open your airways and make breathing easier. Bronchodilators are either short-acting (work for 6 to 9 hours) or long-acting (work for 24 hours). You inhale most bronchodilators, so they start to act quickly. Always carry your quick-relief inhaler with you in case you need it while you are away from home. ? Corticosteroids. These reduce airway inflammation. They come in pill or inhaled form. You must take these medicines every day for them to work well. ? Antibiotics. These medicines are used when you have a bacterial lung infection. · Take your medicines exactly as prescribed. Call your doctor if you think you are having a problem with your medicine. · Oxygen therapy boosts the amount of oxygen in your blood and helps you breathe easier. Use the flow rate your doctor has recommended, and do not change it without talking to your doctor first.  Other care at home  · If your doctor recommends it, get more exercise. Walking is a good choice. Bit by bit, increase the amount you walk every day. Try for at least 30 minutes on most days of the week.   · Learn breathing methods--such as breathing through pursed lips--to help you become less short of breath. · If your doctor has not set you up with a pulmonary rehabilitation program, talk to him or her about whether rehab is right for you. Rehab includes exercise programs, education about your disease and how to manage it, help with diet and other changes, and emotional support. · Eat regular, healthy meals. Use bronchodilators about 1 hour before you eat to make it easier to eat. Eat several small meals instead of three large ones. Drink beverages at the end of the meal. Avoid foods that are hard to chew. Follow-up care is a key part of your treatment and safety. Be sure to make and go to all appointments, and call your doctor if you are having problems. It's also a good idea to know your test results and keep a list of the medicines you take. Where can you learn more? Go to [email or web address]://[email or web address] [email or web address]. org and sign in to your [employer] account. Enter C600 in the Oxtox box to learn more about \"Learning About Chronic Bronchitis. \"     If you do not have an account, please click on the \"Sign Up Now\" link. Current as of: February 24, 2020               Content Version: 12.6  © 8570-8992 [employer]. Care instructions adapted under license by [facility] ([facility]). If you have questions about a medical condition or this instruction, always ask your healthcare professional. [address] any warranty or liability for your use of this information.

## 2021-01-08 NOTE — PROGRESS NOTES
Visit Information    Have you changed or started any medications since your last visit including any over-the-counter medicines, vitamins, or herbal medicines? no   Are you having any side effects from any of your medications? -  no  Have you stopped taking any of your medications? Is so, why? -  yes - see updated med list    Have you seen any other physician or provider since your last visit? No  Have you had any other diagnostic tests since your last visit? No  Have you been seen in the emergency room and/or had an admission to a hospital since we last saw you? No  Have you had your routine dental cleaning in the past 6 months? no    Have you activated your [employer] account? If not, what are your barriers?  Yes     Patient Care Team:  Gisela Osorio DO as PCP - General (Family Medicine)  Gisela Osorio DO as PCP - [facility]    Medical History Review  Past Medical, Family, and Social History reviewed and does not contribute to the patient presenting condition    Health Maintenance   Topic Date Due    HIV screen  01/09/1974    Shingles Vaccine (2 of 3) 09/17/2013    Cervical cancer screen  02/02/2019    A1C test (Diabetic or Prediabetic)  04/03/2019    Breast cancer screen  04/25/2019    Lipid screen  03/13/2024    Colon cancer screen colonoscopy  02/02/2026    DTaP/Tdap/Td vaccine (2 - Td) 11/24/2029    Flu vaccine  Completed    Hepatitis C screen  Completed    Hepatitis A vaccine  Aged Out    Hepatitis B vaccine  Aged Out    Hib vaccine  Aged Out    Meningococcal (ACWY) vaccine  Aged Out    Pneumococcal 0-64 years Vaccine  Aged Out

## 2021-01-16 ENCOUNTER — HOSPITAL ENCOUNTER (OUTPATIENT)
Age: 61
Discharge: HOME | End: 2021-01-16
Payer: COMMERCIAL

## 2021-01-16 DIAGNOSIS — Z00.00 WELL ADULT EXAM: ICD-10-CM

## 2021-01-16 LAB
ALBUMIN SERPL-MCNC: 4.4 G/DL (ref 3.5–5.1)
ALP BLD-CCNC: 81 U/L (ref 38–126)
ALT SERPL-CCNC: 32 U/L (ref 11–66)
ANION GAP SERPL CALCULATED.3IONS-SCNC: 12 MEQ/L (ref 8–16)
AST SERPL-CCNC: 28 U/L (ref 5–40)
AVERAGE GLUCOSE: 117 MG/DL (ref 70–126)
BILIRUB SERPL-MCNC: 0.9 MG/DL (ref 0.3–1.2)
BUN BLDV-MCNC: 15 MG/DL (ref 7–22)
CALCIUM SERPL-MCNC: 9.6 MG/DL (ref 8.5–10.5)
CHLORIDE BLD-SCNC: 100 MEQ/L (ref 98–111)
CHOLESTEROL, TOTAL: 175 MG/DL (ref 100–199)
CO2: 24 MEQ/L (ref 23–33)
CREAT SERPL-MCNC: 0.6 MG/DL (ref 0.4–1.2)
ERYTHROCYTE [DISTWIDTH] IN BLOOD BY AUTOMATED COUNT: 13 % (ref 11.5–14.5)
ERYTHROCYTE [DISTWIDTH] IN BLOOD BY AUTOMATED COUNT: 41.3 FL (ref 35–45)
GFR SERPL CREATININE-BSD FRML MDRD: > 90 ML/MIN/1.73M2
GLUCOSE BLD-MCNC: 104 MG/DL (ref 70–108)
HBA1C MFR BLD: 5.9 % (ref 4.4–6.4)
HCT VFR BLD CALC: 42.6 % (ref 37–47)
HDLC SERPL-MCNC: 57 MG/DL
HEMOGLOBIN: 13.8 GM/DL (ref 12–16)
LDL CHOLESTEROL CALCULATED: 105 MG/DL
MCH RBC QN AUTO: 28.3 PG (ref 26–33)
MCHC RBC AUTO-ENTMCNC: 32.4 GM/DL (ref 32.2–35.5)
MCV RBC AUTO: 87.5 FL (ref 81–99)
PLATELET # BLD: 301 THOU/MM3 (ref 130–400)
PMV BLD AUTO: 11 FL (ref 9.4–12.4)
POTASSIUM SERPL-SCNC: 4.2 MEQ/L (ref 3.5–5.2)
RBC # BLD: 4.87 MILL/MM3 (ref 4.2–5.4)
SODIUM BLD-SCNC: 136 MEQ/L (ref 135–145)
TOTAL PROTEIN: 8 G/DL (ref 6.1–8)
TRIGL SERPL-MCNC: 67 MG/DL (ref 0–199)
TSH SERPL DL<=0.05 MIU/L-ACNC: 1.81 UIU/ML (ref 0.4–4.2)
WBC # BLD: 8.2 THOU/MM3 (ref 4.8–10.8)

## 2021-01-16 PROCEDURE — 36415 COLL VENOUS BLD VENIPUNCTURE: CPT

## 2021-01-16 PROCEDURE — 80053 COMPREHEN METABOLIC PANEL: CPT

## 2021-01-16 PROCEDURE — 80061 LIPID PANEL: CPT

## 2021-01-16 PROCEDURE — 83036 HEMOGLOBIN GLYCOSYLATED A1C: CPT

## 2021-01-16 PROCEDURE — 85027 COMPLETE CBC AUTOMATED: CPT

## 2021-01-16 PROCEDURE — 84443 ASSAY THYROID STIM HORMONE: CPT

## 2021-02-03 ENCOUNTER — TELEPHONE (OUTPATIENT)
Dept: FAMILY MEDICINE CLINIC | Age: 61
End: 2021-02-03

## 2021-02-03 DIAGNOSIS — Z77.22 HISTORY OF SECOND HAND SMOKE EXPOSURE: ICD-10-CM

## 2021-02-03 DIAGNOSIS — R06.02 SOB (SHORTNESS OF BREATH) ON EXERTION: ICD-10-CM

## 2021-02-03 RX ORDER — FLUTICASONE FUROATE AND VILANTEROL TRIFENATATE 100; 25 UG/1; UG/1
1 POWDER RESPIRATORY (INHALATION) DAILY
Qty: 90 EACH | Refills: 3 | Status: SHIPPED | OUTPATIENT
Start: 2021-02-03 | End: 2021-10-29

## 2021-02-03 NOTE — TELEPHONE ENCOUNTER
Pt called office with an update, says the inhaler you gave her is helping. She is requesting a refill of Lolly Mail sent to [address] [place] [address]. If no call back, she will check with the pharmacy after noon. Refill if appropriate.

## 2021-04-07 ENCOUNTER — OFFICE VISIT (OUTPATIENT)
Dept: FAMILY MEDICINE CLINIC | Age: 61
End: 2021-04-07
Payer: COMMERCIAL

## 2021-04-07 VITALS
WEIGHT: 239.5 LBS | SYSTOLIC BLOOD PRESSURE: 128 MMHG | HEART RATE: 72 BPM | BODY MASS INDEX: 41.11 KG/M2 | RESPIRATION RATE: 16 BRPM | DIASTOLIC BLOOD PRESSURE: 70 MMHG

## 2021-04-07 DIAGNOSIS — M76.61 ACHILLES TENDINITIS OF BOTH LOWER EXTREMITIES: Primary | ICD-10-CM

## 2021-04-07 DIAGNOSIS — M76.62 ACHILLES TENDINITIS OF BOTH LOWER EXTREMITIES: Primary | ICD-10-CM

## 2021-04-07 PROCEDURE — 99213 OFFICE O/P EST LOW 20 MIN: CPT | Performed by: FAMILY MEDICINE

## 2021-04-07 RX ORDER — NAPROXEN 500 MG/1
500 TABLET ORAL 2 TIMES DAILY WITH MEALS
Qty: 60 TABLET | Refills: 0 | Status: SHIPPED | OUTPATIENT
Start: 2021-04-07 | End: 2021-10-29

## 2021-04-07 ASSESSMENT — ENCOUNTER SYMPTOMS
GASTROINTESTINAL NEGATIVE: 1
RESPIRATORY NEGATIVE: 1

## 2021-04-07 NOTE — PATIENT INSTRUCTIONS
You may receive a survey about your visit with us today. The feedback from our patients helps us identify what is working well and where the service to all patients can be enhanced. Thank you! Patient Education        Achilles Tendon: Exercises  Introduction  Here are some examples of exercises for you to try. The exercises may be suggested for a condition or for rehabilitation. Start each exercise slowly. Ease off the exercises if you start to have pain. You will be told when to start these exercises and which ones will work best for you. How to do the exercises  Toe stretch   1. Sit in a chair, and extend your affected leg so that your heel is on the floor. 2. With your hand, reach down and pull your big toe up and back. Pull toward your ankle and away from the floor. 3. Hold the position for at least 15 to 30 seconds. 4. Repeat 2 to 4 times a session, several times a day. Calf-plantar fascia stretch   1. Sit with your legs extended and knees straight. 2. Place a towel around your foot just under the toes. 3. Hold each end of the towel in each hand, with your hands above your knees. 4. Pull back with the towel so that your foot stretches toward you. 5. Hold the position for at least 15 to 30 seconds. 6. Repeat 2 to 4 times a session, up to 5 sessions a day. Floor stretch   1. Stand about 2 feet from a wall, and place your hands on the wall at about shoulder height. Or you can stand behind a chair, placing your hands on the back of it for balance. 2. Step back with the leg you want to stretch. Keep the leg straight, and press your heel into the floor with your toe turned slightly in.  3. Lean forward, and bend your other leg slightly. Feel the stretch in the Achilles tendon of your back leg. Hold for at least 15 to 30 seconds. 4. Repeat 2 to 4 times a session, up to 5 sessions a day.     Stair stretch   1. Stand with the balls of both feet on the edge of a step or curb (or a medium-sized phone [employer], [employer]. Care instructions adapted under license by [facility] ([facility]). If you have questions about a medical condition or this instruction, always ask your healthcare professional. [address] any warranty or liability for your use of this information.

## 2021-04-07 NOTE — PROGRESS NOTES
Visit Information    Have you changed or started any medications since your last visit including any over-the-counter medicines, vitamins, or herbal medicines? no   Are you having any side effects from any of your medications? -  no  Have you stopped taking any of your medications? Is so, why? -  no    Have you seen any other physician or provider since your last visit? No  Have you had any other diagnostic tests since your last visit? No  Have you been seen in the emergency room and/or had an admission to a hospital since we last saw you? No  Have you had your routine dental cleaning in the past 6 months? yes -      Have you activated your [employer] account? If not, what are your barriers?  Yes     Patient Care Team:  Jon Garcia DO as PCP - General (Family Medicine)  Jon Garcia DO as PCP - Kyleigh Burgos Provider    Medical History Review  Past Medical, Family, and Social History reviewed and does contribute to the patient presenting condition    Health Maintenance   Topic Date Due    HIV screen  Never done    Shingles Vaccine (2 of 3) 09/17/2013    Cervical cancer screen  02/02/2019    A1C test (Diabetic or Prediabetic)  01/16/2022    Breast cancer screen  01/08/2023    Colon cancer screen fecal DNA test (Cologuard)  02/02/2024    Lipid screen  01/16/2026    DTaP/Tdap/Td vaccine (2 - Td) 11/24/2029    Flu vaccine  Completed    COVID-19 Vaccine  Completed    Hepatitis C screen  Completed    Hepatitis A vaccine  Aged Out    Hepatitis B vaccine  Aged Out    Hib vaccine  Aged Out    Meningococcal (ACWY) vaccine  Aged Out    Pneumococcal 0-64 years Vaccine  Aged Out

## 2021-04-07 NOTE — PROGRESS NOTES
Herve Quiroz (:  1960) is a 57 y.o. female,Established patient, here for evaluation of the following chief complaint(s):  Leg Pain (bilateral) and Foot Swelling (bilateral)        SUBJECTIVE/OBJECTIVE:  HPI:    Chief Complaint   Patient presents with    Leg Pain     bilateral    Foot Swelling     bilateral     Pt here for bilateral foot and ankle pain for the last few months. NKI. Hx of plantar fasciitis and \"bone spurs\". Once she is up walking, it feels better. Patient Active Problem List   Diagnosis    GERD (gastroesophageal reflux disease)    Adjustment disorder with depressed mood    MISHA (obstructive sleep apnea)     Past Surgical History:   Procedure Laterality Date    ENDOMETRIAL ABLATION      FOOT SURGERY      rt foot    TUBAL LIGATION       Social History     Tobacco Use    Smoking status: Never Smoker    Smokeless tobacco: Never Used   Substance Use Topics    Alcohol use: Yes     Comment: occasionally    Drug use: No         Review of Systems   Constitutional: Negative. HENT: Negative. Respiratory: Negative. Cardiovascular: Negative. Gastrointestinal: Negative. Musculoskeletal: Positive for arthralgias (bl foot/ankle pain). All other systems reviewed and are negative. Physical Exam  Vitals signs and nursing note reviewed. Constitutional:       General: She is not in acute distress. Appearance: Normal appearance. She is well-developed. HENT:      Head: Normocephalic and atraumatic. Right Ear: Tympanic membrane normal.      Left Ear: Tympanic membrane normal.   Eyes:      Conjunctiva/sclera: Conjunctivae normal.   Neck:      Musculoskeletal: Neck supple. Cardiovascular:      Rate and Rhythm: Normal rate and regular rhythm. Heart sounds: Normal heart sounds. No murmur. Pulmonary:      Effort: Pulmonary effort is normal.      Breath sounds: Normal breath sounds. No wheezing, rhonchi or rales.    Abdominal:      General: There is no

## 2021-06-16 LAB
CHOLESTEROL, TOTAL: 181 MG/DL (ref 0–199)
FASTING: YES
GLUCOSE BLD-MCNC: 113 MG/DL (ref 74–109)
HDLC SERPL-MCNC: 54 MG/DL (ref 40–90)
LDL CHOLESTEROL CALCULATED: 109 MG/DL
TRIGL SERPL-MCNC: 92 MG/DL (ref 0–199)

## 2021-06-20 ENCOUNTER — HOSPITAL ENCOUNTER (EMERGENCY)
Age: 61
Discharge: HOME | End: 2021-06-20
Payer: COMMERCIAL

## 2021-06-20 VITALS
RESPIRATION RATE: 16 BRPM | TEMPERATURE: 97.4 F | HEIGHT: 64 IN | SYSTOLIC BLOOD PRESSURE: 168 MMHG | BODY MASS INDEX: 42 KG/M2 | DIASTOLIC BLOOD PRESSURE: 94 MMHG | HEART RATE: 84 BPM | WEIGHT: 246 LBS | OXYGEN SATURATION: 98 %

## 2021-06-20 DIAGNOSIS — L25.5 CONTACT DERMATITIS DUE TO PLANT: Primary | ICD-10-CM

## 2021-06-20 PROCEDURE — 6360000002 HC RX W HCPCS: Performed by: NURSE PRACTITIONER

## 2021-06-20 PROCEDURE — 96372 THER/PROPH/DIAG INJ SC/IM: CPT

## 2021-06-20 PROCEDURE — 99213 OFFICE O/P EST LOW 20 MIN: CPT

## 2021-06-20 PROCEDURE — 99214 OFFICE O/P EST MOD 30 MIN: CPT | Performed by: NURSE PRACTITIONER

## 2021-06-20 RX ORDER — MOMETASONE FUROATE 1 MG/G
CREAM TOPICAL
Qty: 50 G | Refills: 0 | Status: SHIPPED | OUTPATIENT
Start: 2021-06-20 | End: 2022-01-05 | Stop reason: ALTCHOICE

## 2021-06-20 RX ORDER — METHYLPREDNISOLONE 4 MG/1
TABLET ORAL
Qty: 1 KIT | Refills: 0 | Status: SHIPPED | OUTPATIENT
Start: 2021-06-20 | End: 2021-06-26

## 2021-06-20 RX ORDER — METHYLPREDNISOLONE ACETATE 80 MG/ML
120 INJECTION, SUSPENSION INTRA-ARTICULAR; INTRALESIONAL; INTRAMUSCULAR; SOFT TISSUE ONCE
Status: COMPLETED | OUTPATIENT
Start: 2021-06-20 | End: 2021-06-20

## 2021-06-20 RX ADMIN — METHYLPREDNISOLONE ACETATE 120 MG: 80 INJECTION, SUSPENSION INTRA-ARTICULAR; INTRALESIONAL; INTRAMUSCULAR; SOFT TISSUE at 14:58

## 2021-06-20 ASSESSMENT — ENCOUNTER SYMPTOMS
SHORTNESS OF BREATH: 0
CHEST TIGHTNESS: 0
ALLERGIC/IMMUNOLOGIC NEGATIVE: 1
COUGH: 0
EYES NEGATIVE: 1
GASTROINTESTINAL NEGATIVE: 1
WHEEZING: 0

## 2021-06-20 NOTE — ED PROVIDER NOTES
mouth 2 times daily (with meals), Disp-60 tablet, R-0Normal             ALLERGIES     Patient is is allergic to sulfa antibiotics. FAMILY HISTORY     Patient'sfamily history includes Arthritis in her mother; Diabetes in her father; High Blood Pressure in her father; Stroke in her father and mother. SOCIAL HISTORY     Patient  reports that she has never smoked. She has never used smokeless tobacco. She reports previous alcohol use. She reports that she does not use drugs. PHYSICAL EXAM     ED TRIAGE VITALS  BP: (!) 168/94, Temp: 97.4 °F (36.3 °C), Pulse: 84, Resp: 16, SpO2: 98 %  Physical Exam  Vitals and nursing note reviewed. Constitutional:       Appearance: Normal appearance. She is well-developed and normal weight. She is not ill-appearing. HENT:      Head: Normocephalic and atraumatic. Right Ear: Ear canal and external ear normal.      Left Ear: Ear canal and external ear normal.      Nose: Nose normal. No congestion. Mouth/Throat:      Mouth: Mucous membranes are moist.      Pharynx: No oropharyngeal exudate or posterior oropharyngeal erythema. Eyes:      General:         Right eye: No discharge. Left eye: No discharge. Conjunctiva/sclera: Conjunctivae normal.   Neck:      Thyroid: No thyromegaly. Cardiovascular:      Rate and Rhythm: Normal rate and regular rhythm. Pulses: Normal pulses. Heart sounds: Normal heart sounds. Pulmonary:      Effort: Pulmonary effort is normal. No respiratory distress. Breath sounds: Normal breath sounds. No wheezing or rales. Chest:      Chest wall: No tenderness. Abdominal:      General: Bowel sounds are normal. There is no distension. Palpations: Abdomen is soft. Tenderness: There is no abdominal tenderness. Musculoskeletal:         General: Normal range of motion. Cervical back: Normal range of motion and neck supple. Tenderness present. No muscular tenderness.    Lymphadenopathy:      Cervical: No cervical adenopathy. Skin:     General: Skin is warm and dry. Capillary Refill: Capillary refill takes less than 2 seconds. Coloration: Skin is not pale. Findings: Erythema and rash present. Rash is urticarial.          Neurological:      General: No focal deficit present. Mental Status: She is alert and oriented to person, place, and time. Mental status is at baseline. Cranial Nerves: No cranial nerve deficit. Motor: No abnormal muscle tone. Coordination: Coordination normal.      Deep Tendon Reflexes: Reflexes are normal and symmetric. Reflexes normal.   Psychiatric:         Behavior: Behavior normal.         Thought Content: Thought content normal.         Judgment: Judgment normal.         DIAGNOSTIC RESULTS   Labs: No results found for this visit on 06/20/21. IMAGING:  No orders to display     URGENT CARE COURSE:     Vitals:    06/20/21 1430 06/20/21 1519   BP: (!) 156/94 (!) 168/94   Pulse: 85 84   Resp: 16 16   Temp: 97.4 °F (36.3 °C)    TempSrc: Temporal    SpO2: 97% 98%   Weight: 246 lb (111.6 kg)    Height: 5' 4\" (1.626 m)        Medications   methylPREDNISolone acetate (DEPO-MEDROL) injection 120 mg (120 mg Intramuscular Given 6/20/21 1458)     PROCEDURES:  None  FINALIMPRESSION    I have reviewed the patient's medical history in detail and updated the computerized patient record. HPI/ROS per the patient and caregiver. Overall non toxic in appearance. Answers questions appropriately. Conditions discussed and addressed this visit include:     Diffuse macular rash of the face, trunk, arms and leg. Unresponsive to conservative measures. Instructions provided on care of the sites as well as comfort relief. Pt agreeable to plan of care.    1. Contact dermatitis due to plant        DISPOSITION/PLAN   DISPOSITION      PATIENT REFERRED TO:  Alexandra Elder 1, [address]  [phone]    In 3 days  As needed, If symptoms worsen    DISCHARGE MEDICATIONS:  Discharge Medication List as of 6/20/2021  2:55 PM      START taking these medications    Details   methylPREDNISolone (MEDROL, JHONNY,) 4 MG tablet Take by mouth., Disp-1 kit, R-0Normal      mometasone (ELOCON) 0.1 % cream Apply topically daily to itching sites. , Disp-50 g, R-0, Normal           Discharge Medication List as of 6/20/2021  2:55 PM          LINDY Delvalle - LINDY Aguilar - CNP  06/20/21 [address] LINDY Read - ROSALES  06/20/21 9551

## 2021-06-21 ENCOUNTER — TELEPHONE (OUTPATIENT)
Dept: FAMILY MEDICINE CLINIC | Age: 61
End: 2021-06-21

## 2021-07-26 ENCOUNTER — TELEPHONE (OUTPATIENT)
Dept: FAMILY MEDICINE CLINIC | Age: 61
End: 2021-07-26

## 2021-07-26 RX ORDER — ALBUTEROL SULFATE 90 UG/1
2 AEROSOL, METERED RESPIRATORY (INHALATION) 4 TIMES DAILY PRN
Qty: 1 INHALER | Refills: 1 | Status: SHIPPED | OUTPATIENT
Start: 2021-07-26 | End: 2022-01-02 | Stop reason: SDUPTHER

## 2021-07-26 NOTE — TELEPHONE ENCOUNTER
Patient calling and stating her Paul Grover is not holding her all day and she is having some SOB with activity and in the evening. She does not have a rescue inhaler. Pharmacy is [address]   Will check with pharmacy after 1pm.  Please advise

## 2021-10-29 ENCOUNTER — OFFICE VISIT (OUTPATIENT)
Dept: FAMILY MEDICINE CLINIC | Age: 61
End: 2021-10-29
Payer: COMMERCIAL

## 2021-10-29 VITALS
BODY MASS INDEX: 42.12 KG/M2 | RESPIRATION RATE: 18 BRPM | SYSTOLIC BLOOD PRESSURE: 118 MMHG | HEART RATE: 82 BPM | WEIGHT: 245.4 LBS | DIASTOLIC BLOOD PRESSURE: 72 MMHG

## 2021-10-29 DIAGNOSIS — R73.01 IFG (IMPAIRED FASTING GLUCOSE): ICD-10-CM

## 2021-10-29 DIAGNOSIS — M79.672 INFLAMMATORY PAIN OF LEFT HEEL: Primary | ICD-10-CM

## 2021-10-29 PROCEDURE — 99213 OFFICE O/P EST LOW 20 MIN: CPT | Performed by: NURSE PRACTITIONER

## 2021-10-29 RX ORDER — CELECOXIB 200 MG/1
200 CAPSULE ORAL 2 TIMES DAILY
Qty: 60 CAPSULE | Refills: 0 | Status: SHIPPED | OUTPATIENT
Start: 2021-10-29 | End: 2022-01-05 | Stop reason: ALTCHOICE

## 2021-10-29 NOTE — PROGRESS NOTES
Leonardo Simmons (1960) 57 y.o. female here for evaluation of the following chief complaint(s):      HPI:  Chief Complaint   Patient presents with    Joint Pain     hips, knees, feet     Immunizations     discuss COVID vaccine booster          Left foot pain. Seen PCP in Spring for achilles tendonitis. Given NSAID and use of boot. Bilateral plantar fascitis surgery in past.   Continues with pain. On her feet all day. Seen POD - Dr. Venu Ventura in past.     Complains of joint pain hips, knee and feet. Bilateral.  Arhtritis work up in past.  Seen Dr. Rodney Munson in past x 1. No follow up. Vitals:    10/29/21 1026   BP: 118/72   Pulse: 82   Resp: 18       Patient Active Problem List   Diagnosis    GERD (gastroesophageal reflux disease)    Adjustment disorder with depressed mood    MISHA (obstructive sleep apnea)       SUBJECTIVE/OBJECTIVE:  Review of Systems   Constitutional: Negative for chills and fever. HENT: Negative. Respiratory: Negative for cough and shortness of breath. Cardiovascular: Negative for chest pain. Gastrointestinal: Negative for abdominal pain and nausea. Musculoskeletal: Positive for arthralgias. Skin: Negative for rash. Neurological: Negative for dizziness, light-headedness and headaches. Psychiatric/Behavioral: Negative. Physical Exam  Constitutional:       General: She is not in acute distress. Eyes:      Pupils: Pupils are equal, round, and reactive to light. Cardiovascular:      Rate and Rhythm: Normal rate and regular rhythm. Heart sounds: No murmur heard. Pulmonary:      Effort: Pulmonary effort is normal.      Breath sounds: Normal breath sounds. No wheezing. Abdominal:      General: Bowel sounds are normal. There is no distension. Palpations: Abdomen is soft. Tenderness: There is no abdominal tenderness. Musculoskeletal:         General: No tenderness. Normal range of motion.       Cervical back: Normal range of motion and neck supple. Skin:     General: Skin is warm and dry. Findings: No rash. ASSESSMENT/PLAN:   Diagnosis Orders   1. Inflammatory pain of left heel  celecoxib (CELEBREX) 200 MG capsule    XR FOOT LEFT (MIN 3 VIEWS)   2. IFG (impaired fasting glucose)  Hemoglobin A1C         MDM: XR Foot due to continued pain   Celebrex BID - per recommendation of RHEUM at her only visit   Follow back up with RHEUM   Check A1C due to elevated glucose in wellness lab   RTO PRN      An electronic signature was used to authenticate this note.     --LINDY Rivera - CNP

## 2021-10-31 ASSESSMENT — ENCOUNTER SYMPTOMS
COUGH: 0
SHORTNESS OF BREATH: 0
NAUSEA: 0
ABDOMINAL PAIN: 0

## 2021-11-19 ENCOUNTER — HOSPITAL ENCOUNTER (OUTPATIENT)
Age: 61
Discharge: HOME | End: 2021-11-19
Payer: COMMERCIAL

## 2021-11-19 ENCOUNTER — HOSPITAL ENCOUNTER (OUTPATIENT)
Dept: GENERAL RADIOLOGY | Age: 61
Discharge: HOME | End: 2021-11-19
Payer: COMMERCIAL

## 2021-11-19 DIAGNOSIS — M79.672 INFLAMMATORY PAIN OF LEFT HEEL: ICD-10-CM

## 2021-11-19 DIAGNOSIS — R73.01 IFG (IMPAIRED FASTING GLUCOSE): ICD-10-CM

## 2021-11-19 LAB
AVERAGE GLUCOSE: 120 MG/DL (ref 70–126)
HBA1C MFR BLD: 6 % (ref 4.4–6.4)

## 2021-11-19 PROCEDURE — 36415 COLL VENOUS BLD VENIPUNCTURE: CPT

## 2021-11-19 PROCEDURE — 83036 HEMOGLOBIN GLYCOSYLATED A1C: CPT

## 2021-11-19 PROCEDURE — 73630 X-RAY EXAM OF FOOT: CPT

## 2022-01-03 RX ORDER — ALBUTEROL SULFATE 90 UG/1
2 AEROSOL, METERED RESPIRATORY (INHALATION) 4 TIMES DAILY PRN
Qty: 1 EACH | Refills: 5 | Status: SHIPPED | OUTPATIENT
Start: 2022-01-03 | End: 2022-09-07 | Stop reason: SDUPTHER

## 2022-01-05 ENCOUNTER — HOSPITAL ENCOUNTER (OUTPATIENT)
Age: 62
Discharge: HOME | End: 2022-01-05
Payer: COMMERCIAL

## 2022-01-05 LAB
ALBUMIN SERPL-MCNC: 4.3 G/DL (ref 3.5–5.1)
ALP BLD-CCNC: 96 U/L (ref 38–126)
ALT SERPL-CCNC: 29 U/L (ref 11–66)
ANION GAP SERPL CALCULATED.3IONS-SCNC: 11 MEQ/L (ref 8–16)
AST SERPL-CCNC: 26 U/L (ref 5–40)
BILIRUB SERPL-MCNC: 1.1 MG/DL (ref 0.3–1.2)
BUN BLDV-MCNC: 12 MG/DL (ref 7–22)
CALCIUM SERPL-MCNC: 9.5 MG/DL (ref 8.5–10.5)
CHLORIDE BLD-SCNC: 103 MEQ/L (ref 98–111)
CO2: 21 MEQ/L (ref 23–33)
CREAT SERPL-MCNC: 0.5 MG/DL (ref 0.4–1.2)
EKG ATRIAL RATE: 79 BPM
EKG P AXIS: -9 DEGREES
EKG P-R INTERVAL: 180 MS
EKG Q-T INTERVAL: 404 MS
EKG QRS DURATION: 78 MS
EKG QTC CALCULATION (BAZETT): 463 MS
EKG R AXIS: -17 DEGREES
EKG T AXIS: 3 DEGREES
EKG VENTRICULAR RATE: 79 BPM
ERYTHROCYTE [DISTWIDTH] IN BLOOD BY AUTOMATED COUNT: 13 % (ref 11.5–14.5)
ERYTHROCYTE [DISTWIDTH] IN BLOOD BY AUTOMATED COUNT: 40.4 FL (ref 35–45)
GFR SERPL CREATININE-BSD FRML MDRD: > 90 ML/MIN/1.73M2
GLUCOSE BLD-MCNC: 99 MG/DL (ref 70–108)
HCT VFR BLD CALC: 41.5 % (ref 37–47)
HEMOGLOBIN: 13.5 GM/DL (ref 12–16)
MCH RBC QN AUTO: 27.9 PG (ref 26–33)
MCHC RBC AUTO-ENTMCNC: 32.5 GM/DL (ref 32.2–35.5)
MCV RBC AUTO: 85.7 FL (ref 81–99)
PLATELET # BLD: 282 THOU/MM3 (ref 130–400)
PMV BLD AUTO: 11 FL (ref 9.4–12.4)
POTASSIUM SERPL-SCNC: 4 MEQ/L (ref 3.5–5.2)
RBC # BLD: 4.84 MILL/MM3 (ref 4.2–5.4)
SODIUM BLD-SCNC: 135 MEQ/L (ref 135–145)
TOTAL PROTEIN: 7.7 G/DL (ref 6.1–8)
WBC # BLD: 6.6 THOU/MM3 (ref 4.8–10.8)

## 2022-01-05 PROCEDURE — 93005 ELECTROCARDIOGRAM TRACING: CPT | Performed by: PODIATRIST

## 2022-01-05 PROCEDURE — 80053 COMPREHEN METABOLIC PANEL: CPT

## 2022-01-05 PROCEDURE — 85027 COMPLETE CBC AUTOMATED: CPT

## 2022-01-05 PROCEDURE — 36415 COLL VENOUS BLD VENIPUNCTURE: CPT

## 2022-01-05 PROCEDURE — 93010 ELECTROCARDIOGRAM REPORT: CPT | Performed by: INTERNAL MEDICINE

## 2022-01-05 NOTE — PROGRESS NOTES
Covid screening questionnaire complete and negative for symptoms or exposure see chart for documentation.   Please notify your doctor if you develop any signs of illness  Please wear a mask when you come to the hospital    NPO after midnight  Bring insurance info and drivers license  Wear comfortable clean clothing  Do not bring jewelry  Shower night before and morning of surgery with a liquid antibacterial soap  Bring list of medications with dosage and how often taken  Follow all instructions given by your physician   needed at discharge  No visitors allowed in with patient at this time  Please bring and wear mask  Call [phone] for any questions

## 2022-01-10 ENCOUNTER — OFFICE VISIT (OUTPATIENT)
Dept: FAMILY MEDICINE CLINIC | Age: 62
End: 2022-01-10
Payer: COMMERCIAL

## 2022-01-10 VITALS
WEIGHT: 242.8 LBS | HEART RATE: 76 BPM | DIASTOLIC BLOOD PRESSURE: 80 MMHG | BODY MASS INDEX: 41.68 KG/M2 | RESPIRATION RATE: 16 BRPM | SYSTOLIC BLOOD PRESSURE: 124 MMHG

## 2022-01-10 DIAGNOSIS — E66.01 MORBID OBESITY WITH BMI OF 40.0-44.9, ADULT (HCC): ICD-10-CM

## 2022-01-10 DIAGNOSIS — M25.775 OSTEOPHYTE OF LEFT FOOT: ICD-10-CM

## 2022-01-10 DIAGNOSIS — R73.01 IFG (IMPAIRED FASTING GLUCOSE): ICD-10-CM

## 2022-01-10 DIAGNOSIS — M66.872 NONTRAUMATIC TEAR OF TIBIALIS POSTERIOR TENDON, LEFT: ICD-10-CM

## 2022-01-10 DIAGNOSIS — M77.32 CALCANEAL SPUR OF LEFT FOOT: ICD-10-CM

## 2022-01-10 DIAGNOSIS — M76.62 TENDONITIS, ACHILLES, LEFT: ICD-10-CM

## 2022-01-10 DIAGNOSIS — Z01.818 PRE-OP EVALUATION: Primary | ICD-10-CM

## 2022-01-10 PROCEDURE — 99213 OFFICE O/P EST LOW 20 MIN: CPT | Performed by: FAMILY MEDICINE

## 2022-01-10 SDOH — ECONOMIC STABILITY: FOOD INSECURITY: WITHIN THE PAST 12 MONTHS, YOU WORRIED THAT YOUR FOOD WOULD RUN OUT BEFORE YOU GOT MONEY TO BUY MORE.: NEVER TRUE

## 2022-01-10 SDOH — ECONOMIC STABILITY: FOOD INSECURITY: WITHIN THE PAST 12 MONTHS, THE FOOD YOU BOUGHT JUST DIDN'T LAST AND YOU DIDN'T HAVE MONEY TO GET MORE.: NEVER TRUE

## 2022-01-10 ASSESSMENT — PATIENT HEALTH QUESTIONNAIRE - PHQ9
SUM OF ALL RESPONSES TO PHQ9 QUESTIONS 1 & 2: 2
2. FEELING DOWN, DEPRESSED OR HOPELESS: 1
SUM OF ALL RESPONSES TO PHQ QUESTIONS 1-9: 2
1. LITTLE INTEREST OR PLEASURE IN DOING THINGS: 1
SUM OF ALL RESPONSES TO PHQ QUESTIONS 1-9: 2

## 2022-01-10 ASSESSMENT — SOCIAL DETERMINANTS OF HEALTH (SDOH): HOW HARD IS IT FOR YOU TO PAY FOR THE VERY BASICS LIKE FOOD, HOUSING, MEDICAL CARE, AND HEATING?: NOT HARD AT ALL

## 2022-01-10 ASSESSMENT — ENCOUNTER SYMPTOMS
RESPIRATORY NEGATIVE: 1
GASTROINTESTINAL NEGATIVE: 1

## 2022-01-10 NOTE — PROGRESS NOTES
Himanshu Spears (:  1960) is a 60 y.o. female,Established patient, here for evaluation of the following chief complaint(s):  Pre-op Exam (22 left foot surgery with Dr Lyle Rincon) and Results        Subjective   SUBJECTIVE/OBJECTIVE:  HPI:   Chief Complaint   Patient presents with    Pre-op Exam     22 left foot surgery with Dr Matty Gaytan evaluation. Scheduled for left foot surgery with Dr. Lyle Rincon on the . Denies CP, chest tightness, SOB. Able to perform 4 METs with no cardiac symptoms. BP Readings from Last 3 Encounters:   01/10/22 124/80   10/29/21 118/72   21 (!) 168/94     Denies hx of general anesthesia complications. Patient Active Problem List   Diagnosis    GERD (gastroesophageal reflux disease)    Adjustment disorder with depressed mood    MISHA (obstructive sleep apnea)     Past Surgical History:   Procedure Laterality Date    ENDOMETRIAL ABLATION      FOOT SURGERY      rt foot    TUBAL LIGATION       Social History     Tobacco Use    Smoking status: Never Smoker    Smokeless tobacco: Never Used   Vaping Use    Vaping Use: Never used   Substance Use Topics    Alcohol use: Not Currently     Comment: occasionally    Drug use: No         Review of Systems   Constitutional: Negative. HENT: Negative. Respiratory: Negative. Cardiovascular: Negative. Gastrointestinal: Negative. Musculoskeletal: Negative. All other systems reviewed and are negative. Objective   Physical Exam  Vitals and nursing note reviewed. Constitutional:       General: She is not in acute distress. Appearance: Normal appearance. She is well-developed. HENT:      Head: Normocephalic and atraumatic. Right Ear: Tympanic membrane normal.      Left Ear: Tympanic membrane normal.   Eyes:      Conjunctiva/sclera: Conjunctivae normal.   Cardiovascular:      Rate and Rhythm: Normal rate and regular rhythm.       Heart sounds: Normal heart sounds. No murmur heard. Pulmonary:      Effort: Pulmonary effort is normal.      Breath sounds: Normal breath sounds. No wheezing, rhonchi or rales. Abdominal:      General: There is no distension. Musculoskeletal:      Cervical back: Neck supple. Skin:     General: Skin is warm and dry. Findings: No rash (on exposed surfaces). Neurological:      General: No focal deficit present. Mental Status: She is alert. Psychiatric:         Attention and Perception: Attention normal.         Mood and Affect: Mood normal.         Speech: Speech normal.         Behavior: Behavior normal. Behavior is cooperative. Thought Content: Thought content normal.         Judgment: Judgment normal.               ASSESSMENT/PLAN:  1. Pre-op evaluation  2. Tendonitis, Achilles, left  3. Calcaneal spur of left foot  4. Osteophyte of left foot  5. Nontraumatic tear of tibialis posterior tendon, left  6. IFG (impaired fasting glucose)  7. Morbid obesity with BMI of 40.0-44.9, adult (HCC)    -  Labs, EKG reviewed  -  Pt acceptable risk for surgery, copy of this note will be sent to Dr. Shad Álvarez      Return for as needed. An electronic signature was used to authenticate this note.     --Roula Covarrubias,

## 2022-01-12 ENCOUNTER — ANESTHESIA EVENT (OUTPATIENT)
Dept: OPERATING ROOM | Age: 62
End: 2022-01-12
Payer: COMMERCIAL

## 2022-01-12 ENCOUNTER — ANESTHESIA (OUTPATIENT)
Dept: OPERATING ROOM | Age: 62
End: 2022-01-12
Payer: COMMERCIAL

## 2022-01-12 ENCOUNTER — HOSPITAL ENCOUNTER (OUTPATIENT)
Age: 62
Setting detail: OUTPATIENT SURGERY
Discharge: HOME | End: 2022-01-12
Attending: PODIATRIST | Admitting: PODIATRIST
Payer: COMMERCIAL

## 2022-01-12 VITALS
BODY MASS INDEX: 41.15 KG/M2 | SYSTOLIC BLOOD PRESSURE: 147 MMHG | TEMPERATURE: 97.2 F | HEIGHT: 64 IN | DIASTOLIC BLOOD PRESSURE: 82 MMHG | HEART RATE: 84 BPM | OXYGEN SATURATION: 95 % | RESPIRATION RATE: 26 BRPM | WEIGHT: 241 LBS

## 2022-01-12 VITALS
RESPIRATION RATE: 13 BRPM | SYSTOLIC BLOOD PRESSURE: 103 MMHG | OXYGEN SATURATION: 99 % | DIASTOLIC BLOOD PRESSURE: 56 MMHG | TEMPERATURE: 96 F

## 2022-01-12 PROCEDURE — 3700000000 HC ANESTHESIA ATTENDED CARE: Performed by: PODIATRIST

## 2022-01-12 PROCEDURE — 7100000001 HC PACU RECOVERY - ADDTL 15 MIN: Performed by: PODIATRIST

## 2022-01-12 PROCEDURE — 3700000001 HC ADD 15 MINUTES (ANESTHESIA): Performed by: PODIATRIST

## 2022-01-12 PROCEDURE — 7100000010 HC PHASE II RECOVERY - FIRST 15 MIN: Performed by: PODIATRIST

## 2022-01-12 PROCEDURE — 2709999900 HC NON-CHARGEABLE SUPPLY: Performed by: PODIATRIST

## 2022-01-12 PROCEDURE — 2500000003 HC RX 250 WO HCPCS: Performed by: PODIATRIST

## 2022-01-12 PROCEDURE — 2720000010 HC SURG SUPPLY STERILE: Performed by: PODIATRIST

## 2022-01-12 PROCEDURE — 7100000011 HC PHASE II RECOVERY - ADDTL 15 MIN: Performed by: PODIATRIST

## 2022-01-12 PROCEDURE — 2580000003 HC RX 258: Performed by: STUDENT IN AN ORGANIZED HEALTH CARE EDUCATION/TRAINING PROGRAM

## 2022-01-12 PROCEDURE — 3600000014 HC SURGERY LEVEL 4 ADDTL 15MIN: Performed by: PODIATRIST

## 2022-01-12 PROCEDURE — 7100000000 HC PACU RECOVERY - FIRST 15 MIN: Performed by: PODIATRIST

## 2022-01-12 PROCEDURE — 6370000000 HC RX 637 (ALT 250 FOR IP)

## 2022-01-12 PROCEDURE — 6360000002 HC RX W HCPCS: Performed by: STUDENT IN AN ORGANIZED HEALTH CARE EDUCATION/TRAINING PROGRAM

## 2022-01-12 PROCEDURE — 3600000004 HC SURGERY LEVEL 4 BASE: Performed by: PODIATRIST

## 2022-01-12 PROCEDURE — 6360000002 HC RX W HCPCS: Performed by: NURSE ANESTHETIST, CERTIFIED REGISTERED

## 2022-01-12 PROCEDURE — 2500000003 HC RX 250 WO HCPCS: Performed by: NURSE ANESTHETIST, CERTIFIED REGISTERED

## 2022-01-12 PROCEDURE — C1713 ANCHOR/SCREW BN/BN,TIS/BN: HCPCS | Performed by: PODIATRIST

## 2022-01-12 DEVICE — SCREW BNE L55MM DIA4MM CANC TI SELF DRL ST NONCANNULATED: Type: IMPLANTABLE DEVICE | Site: ANKLE | Status: FUNCTIONAL

## 2022-01-12 DEVICE — PLATE BNE L20MM SPIDER OFFSET FOR 3.3MM SCR: Type: IMPLANTABLE DEVICE | Site: ANKLE | Status: FUNCTIONAL

## 2022-01-12 RX ORDER — LIDOCAINE HYDROCHLORIDE 20 MG/ML
INJECTION, SOLUTION EPIDURAL; INFILTRATION; INTRACAUDAL; PERINEURAL PRN
Status: DISCONTINUED | OUTPATIENT
Start: 2022-01-12 | End: 2022-01-12 | Stop reason: SDUPTHER

## 2022-01-12 RX ORDER — OXYCODONE HYDROCHLORIDE 5 MG/1
10 TABLET ORAL EVERY 4 HOURS PRN
Status: DISCONTINUED | OUTPATIENT
Start: 2022-01-12 | End: 2022-01-12 | Stop reason: HOSPADM

## 2022-01-12 RX ORDER — MORPHINE SULFATE 2 MG/ML
2 INJECTION, SOLUTION INTRAMUSCULAR; INTRAVENOUS EVERY 5 MIN PRN
Status: DISCONTINUED | OUTPATIENT
Start: 2022-01-12 | End: 2022-01-12 | Stop reason: HOSPADM

## 2022-01-12 RX ORDER — MIDAZOLAM HYDROCHLORIDE 1 MG/ML
INJECTION INTRAMUSCULAR; INTRAVENOUS PRN
Status: DISCONTINUED | OUTPATIENT
Start: 2022-01-12 | End: 2022-01-12 | Stop reason: SDUPTHER

## 2022-01-12 RX ORDER — SODIUM CHLORIDE 0.9 % (FLUSH) 0.9 %
10 SYRINGE (ML) INJECTION EVERY 12 HOURS SCHEDULED
Status: DISCONTINUED | OUTPATIENT
Start: 2022-01-12 | End: 2022-01-12 | Stop reason: HOSPADM

## 2022-01-12 RX ORDER — ONDANSETRON 2 MG/ML
4 INJECTION INTRAMUSCULAR; INTRAVENOUS EVERY 6 HOURS PRN
Status: DISCONTINUED | OUTPATIENT
Start: 2022-01-12 | End: 2022-01-12 | Stop reason: HOSPADM

## 2022-01-12 RX ORDER — SODIUM CHLORIDE 9 MG/ML
INJECTION, SOLUTION INTRAVENOUS CONTINUOUS
Status: DISCONTINUED | OUTPATIENT
Start: 2022-01-12 | End: 2022-01-12 | Stop reason: HOSPADM

## 2022-01-12 RX ORDER — GLYCOPYRROLATE 1 MG/5 ML
SYRINGE (ML) INTRAVENOUS PRN
Status: DISCONTINUED | OUTPATIENT
Start: 2022-01-12 | End: 2022-01-12 | Stop reason: SDUPTHER

## 2022-01-12 RX ORDER — ROCURONIUM BROMIDE 10 MG/ML
INJECTION, SOLUTION INTRAVENOUS PRN
Status: DISCONTINUED | OUTPATIENT
Start: 2022-01-12 | End: 2022-01-12 | Stop reason: SDUPTHER

## 2022-01-12 RX ORDER — FENTANYL CITRATE 50 UG/ML
50 INJECTION, SOLUTION INTRAMUSCULAR; INTRAVENOUS EVERY 5 MIN PRN
Status: DISCONTINUED | OUTPATIENT
Start: 2022-01-12 | End: 2022-01-12 | Stop reason: HOSPADM

## 2022-01-12 RX ORDER — CEFAZOLIN SODIUM 2 G/100ML
2000 INJECTION, SOLUTION INTRAVENOUS
Status: COMPLETED | OUTPATIENT
Start: 2022-01-12 | End: 2022-01-12

## 2022-01-12 RX ORDER — SODIUM CHLORIDE 9 MG/ML
25 INJECTION, SOLUTION INTRAVENOUS PRN
Status: DISCONTINUED | OUTPATIENT
Start: 2022-01-12 | End: 2022-01-12 | Stop reason: HOSPADM

## 2022-01-12 RX ORDER — ONDANSETRON 4 MG/1
4 TABLET, ORALLY DISINTEGRATING ORAL EVERY 8 HOURS PRN
Status: DISCONTINUED | OUTPATIENT
Start: 2022-01-12 | End: 2022-01-12 | Stop reason: HOSPADM

## 2022-01-12 RX ORDER — SODIUM CHLORIDE 0.9 % (FLUSH) 0.9 %
10 SYRINGE (ML) INJECTION PRN
Status: DISCONTINUED | OUTPATIENT
Start: 2022-01-12 | End: 2022-01-12 | Stop reason: HOSPADM

## 2022-01-12 RX ORDER — OXYCODONE HYDROCHLORIDE AND ACETAMINOPHEN 5; 325 MG/1; MG/1
1 TABLET ORAL ONCE
Status: COMPLETED | OUTPATIENT
Start: 2022-01-12 | End: 2022-01-12

## 2022-01-12 RX ORDER — PROPOFOL 10 MG/ML
INJECTION, EMULSION INTRAVENOUS PRN
Status: DISCONTINUED | OUTPATIENT
Start: 2022-01-12 | End: 2022-01-12 | Stop reason: SDUPTHER

## 2022-01-12 RX ORDER — OXYCODONE HYDROCHLORIDE AND ACETAMINOPHEN 5; 325 MG/1; MG/1
TABLET ORAL
Status: COMPLETED
Start: 2022-01-12 | End: 2022-01-12

## 2022-01-12 RX ORDER — BUPIVACAINE HYDROCHLORIDE 5 MG/ML
INJECTION, SOLUTION EPIDURAL; INTRACAUDAL PRN
Status: DISCONTINUED | OUTPATIENT
Start: 2022-01-12 | End: 2022-01-12 | Stop reason: ALTCHOICE

## 2022-01-12 RX ORDER — PHENYLEPHRINE HYDROCHLORIDE 10 MG/ML
INJECTION INTRAVENOUS PRN
Status: DISCONTINUED | OUTPATIENT
Start: 2022-01-12 | End: 2022-01-12 | Stop reason: SDUPTHER

## 2022-01-12 RX ORDER — FENTANYL CITRATE 50 UG/ML
INJECTION, SOLUTION INTRAMUSCULAR; INTRAVENOUS PRN
Status: DISCONTINUED | OUTPATIENT
Start: 2022-01-12 | End: 2022-01-12 | Stop reason: SDUPTHER

## 2022-01-12 RX ORDER — DEXAMETHASONE SODIUM PHOSPHATE 10 MG/ML
INJECTION, EMULSION INTRAMUSCULAR; INTRAVENOUS PRN
Status: DISCONTINUED | OUTPATIENT
Start: 2022-01-12 | End: 2022-01-12 | Stop reason: SDUPTHER

## 2022-01-12 RX ORDER — HYDRALAZINE HYDROCHLORIDE 20 MG/ML
5 INJECTION INTRAMUSCULAR; INTRAVENOUS EVERY 10 MIN PRN
Status: DISCONTINUED | OUTPATIENT
Start: 2022-01-12 | End: 2022-01-12 | Stop reason: HOSPADM

## 2022-01-12 RX ORDER — OXYCODONE HYDROCHLORIDE 5 MG/1
5 TABLET ORAL EVERY 4 HOURS PRN
Status: DISCONTINUED | OUTPATIENT
Start: 2022-01-12 | End: 2022-01-12 | Stop reason: HOSPADM

## 2022-01-12 RX ORDER — SUCCINYLCHOLINE/SOD CL,ISO/PF 200MG/10ML
SYRINGE (ML) INTRAVENOUS PRN
Status: DISCONTINUED | OUTPATIENT
Start: 2022-01-12 | End: 2022-01-12 | Stop reason: SDUPTHER

## 2022-01-12 RX ORDER — LIDOCAINE HYDROCHLORIDE AND EPINEPHRINE BITARTRATE 20; .01 MG/ML; MG/ML
INJECTION, SOLUTION SUBCUTANEOUS PRN
Status: DISCONTINUED | OUTPATIENT
Start: 2022-01-12 | End: 2022-01-12 | Stop reason: ALTCHOICE

## 2022-01-12 RX ORDER — NEOSTIGMINE METHYLSULFATE 5 MG/5 ML
SYRINGE (ML) INTRAVENOUS PRN
Status: DISCONTINUED | OUTPATIENT
Start: 2022-01-12 | End: 2022-01-12 | Stop reason: SDUPTHER

## 2022-01-12 RX ADMIN — ROCURONIUM BROMIDE 25 MG: 50 INJECTION, SOLUTION INTRAVENOUS at 11:00

## 2022-01-12 RX ADMIN — OXYCODONE HYDROCHLORIDE AND ACETAMINOPHEN 1 TABLET: 5; 325 TABLET ORAL at 13:41

## 2022-01-12 RX ADMIN — OXYCODONE AND ACETAMINOPHEN 1 TABLET: 5; 325 TABLET ORAL at 13:41

## 2022-01-12 RX ADMIN — Medication 3 MG: at 12:13

## 2022-01-12 RX ADMIN — PHENYLEPHRINE HYDROCHLORIDE 200 MCG: 10 INJECTION INTRAVENOUS at 11:39

## 2022-01-12 RX ADMIN — FENTANYL CITRATE 50 MCG: 50 INJECTION, SOLUTION INTRAMUSCULAR; INTRAVENOUS at 10:46

## 2022-01-12 RX ADMIN — SUGAMMADEX 200 MG: 100 INJECTION, SOLUTION INTRAVENOUS at 12:26

## 2022-01-12 RX ADMIN — SODIUM CHLORIDE: 9 INJECTION, SOLUTION INTRAVENOUS at 10:42

## 2022-01-12 RX ADMIN — DEXAMETHASONE SODIUM PHOSPHATE 10 MG: 10 INJECTION, EMULSION INTRAMUSCULAR; INTRAVENOUS at 11:12

## 2022-01-12 RX ADMIN — Medication 180 MG: at 10:47

## 2022-01-12 RX ADMIN — FENTANYL CITRATE 50 MCG: 50 INJECTION, SOLUTION INTRAMUSCULAR; INTRAVENOUS at 12:25

## 2022-01-12 RX ADMIN — Medication 0.6 MG: at 12:13

## 2022-01-12 RX ADMIN — PROPOFOL 200 MG: 10 INJECTION, EMULSION INTRAVENOUS at 10:47

## 2022-01-12 RX ADMIN — SODIUM CHLORIDE: 9 INJECTION, SOLUTION INTRAVENOUS at 12:04

## 2022-01-12 RX ADMIN — CEFAZOLIN SODIUM 2000 MG: 2 INJECTION, SOLUTION INTRAVENOUS at 10:55

## 2022-01-12 RX ADMIN — LIDOCAINE HYDROCHLORIDE 80 MG: 20 INJECTION, SOLUTION EPIDURAL; INFILTRATION; INTRACAUDAL; PERINEURAL at 10:46

## 2022-01-12 RX ADMIN — MIDAZOLAM 2 MG: 1 INJECTION INTRAMUSCULAR; INTRAVENOUS at 10:45

## 2022-01-12 RX ADMIN — Medication 0.2 MG: at 11:43

## 2022-01-12 ASSESSMENT — PULMONARY FUNCTION TESTS
PIF_VALUE: 18
PIF_VALUE: 1
PIF_VALUE: 24
PIF_VALUE: 24
PIF_VALUE: 25
PIF_VALUE: 25
PIF_VALUE: 0
PIF_VALUE: 26
PIF_VALUE: 25
PIF_VALUE: 24
PIF_VALUE: 26
PIF_VALUE: 24
PIF_VALUE: 28
PIF_VALUE: 25
PIF_VALUE: 0
PIF_VALUE: 25
PIF_VALUE: 24
PIF_VALUE: 25
PIF_VALUE: 24
PIF_VALUE: 57
PIF_VALUE: 2
PIF_VALUE: 24
PIF_VALUE: 25
PIF_VALUE: 1
PIF_VALUE: 24
PIF_VALUE: 20
PIF_VALUE: 25
PIF_VALUE: 30
PIF_VALUE: 24
PIF_VALUE: 26
PIF_VALUE: 25
PIF_VALUE: 25
PIF_VALUE: 24
PIF_VALUE: 25
PIF_VALUE: 25
PIF_VALUE: 24
PIF_VALUE: 24
PIF_VALUE: 25
PIF_VALUE: 26
PIF_VALUE: 25
PIF_VALUE: 26
PIF_VALUE: 25
PIF_VALUE: 24
PIF_VALUE: 27
PIF_VALUE: 26
PIF_VALUE: 25
PIF_VALUE: 25
PIF_VALUE: 24
PIF_VALUE: 25
PIF_VALUE: 24
PIF_VALUE: 25
PIF_VALUE: 24
PIF_VALUE: 25
PIF_VALUE: 24
PIF_VALUE: 25
PIF_VALUE: 4
PIF_VALUE: 24
PIF_VALUE: 25
PIF_VALUE: 24
PIF_VALUE: 25
PIF_VALUE: 24
PIF_VALUE: 24
PIF_VALUE: 25
PIF_VALUE: 24
PIF_VALUE: 26
PIF_VALUE: 25
PIF_VALUE: 25
PIF_VALUE: 24
PIF_VALUE: 25
PIF_VALUE: 24
PIF_VALUE: 25
PIF_VALUE: 1
PIF_VALUE: 25
PIF_VALUE: 26
PIF_VALUE: 24
PIF_VALUE: 27
PIF_VALUE: 25
PIF_VALUE: 1
PIF_VALUE: 24
PIF_VALUE: 24
PIF_VALUE: 28
PIF_VALUE: 27
PIF_VALUE: 25
PIF_VALUE: 25
PIF_VALUE: 0
PIF_VALUE: 25
PIF_VALUE: 18
PIF_VALUE: 25
PIF_VALUE: 25

## 2022-01-12 ASSESSMENT — PAIN - FUNCTIONAL ASSESSMENT: PAIN_FUNCTIONAL_ASSESSMENT: 0-10

## 2022-01-12 ASSESSMENT — PAIN SCALES - GENERAL: PAINLEVEL_OUTOF10: 4

## 2022-01-12 ASSESSMENT — PAIN SCALES - WONG BAKER: WONGBAKER_NUMERICALRESPONSE: 0

## 2022-01-12 ASSESSMENT — PAIN DESCRIPTION - DESCRIPTORS: DESCRIPTORS: ACHING

## 2022-01-12 NOTE — BRIEF OP NOTE
Brief Postoperative Note      Patient: Jade Parish  YOB: 1960  MRN: [id number]    Date of Procedure: 1/12/2022    Pre-Op Diagnosis: ACHILLES TENDINITIS OR BURSITIS, CALCANEAL SPUR, OSTEOPHYTE, ALL ON THE LEFT    Post-Op Diagnosis: Same       Procedure(s):  EXCISION OF POSTERIOR CALCANEAL HEEL SPUR LEFT HEEL, REATTACHMENT OF ACHILLES TENDON ON LEFT WITH SPIDER PLATE AND SCREW    Surgeon(s):  Sher Hill DPM    Assistant:  Resident: Yuni Collins DPM; Rudy Damon DPM    Anesthesia: General    Estimated Blood Loss (mL): less than 50     Complications: None    Specimens:   * No specimens in log *    Implants:  Implant Name Type Inv. Item Serial No.  Lot No. LRB No. Used Action   PLATE BNE [id number] SPIDER OFFSET FOR 3.3MM SCR  PLATE BNE [id number] SPIDER OFFSET FOR 3.3MM SCR  [employer] TRAUMA-WD  Left 1 Implanted   SCREW BNE L55MM DIA4MM CANC TI SELF DRL ST NONCANNULATED  SCREW BNE L55MM DIA4MM CANC TI SELF DRL ST NONCANNULATED  DEPU[employer]TH[employer] [place]-WD  Left 1 Implanted         Drains: * No LDAs found *    Findings: Consistent with diagnosis. Materials: 3-0 Vicryl 4-0 Prolene    Hemostasis: Thigh pneumatic tourniquet    Injectables: 20cc 1:1 mix 1% Lidocaine with epi 0.5% Marcaine with epi and 10cc 0.5% marcaine plain.     Electronically signed by Jamie Watkins DPM on 1/12/2022 at 12:30 PM

## 2022-01-12 NOTE — PROGRESS NOTES
1238: Patient to phase 1 recovery room via cart. Patient arouses slightly to name and then falls back asleep. Patient placed on cardiac monitor and vitals obtained, see charting. Report received from surgical Winston Roblero and Bhavana CRNA. Patient is on 6 liters of oxygen via simple mask. 1240: IV is infusing 0.9 into her right hand. Dressing to her left foot is clean, dry and intact. Unable to view toes on left foot at this time due to dressing. Foot is elevated on a pillow. 1244: Patient's pulse ox is 99% on 6 liters via simple mask. Patient switched to 3 liters via nasal canula. Vital sign remain stable. Patient is resting in bed with eyes closed, respirations are even and unlabored. 1251: Patient is arousing more to name at this time. Pulse ox is 96% on 3 liters- oxygen decreased to 2 liters. Patient stating her throat is just sore. 1256: Patient's pulse ox is 95% on 2 liters- oxygen removed at this time. Vital signs remain stable. 1300: Patient is denying nausea and pain at incision site. Offered ice chips given to help patient's throat. She tolerated them well with no s/s of aspiration. 1306: Warm blankets given. 1308: Patient meets criteria to be discharged from phase 1 to phase 2. Patient moved to phase 2 at this time. 1312: Offered drink and snack provided to the patient. Bed is in the lowest position, call light is within reach and patient's daughter is at the bedside. 1325: Patient is resting in bed and eating/drinking her snacks. No needs or complaints at this time. Daughter remains at the bedside and call light is within reach. 1340: Patient is rating pain a \"4\"/10 and denying nausea and is requesting something for pain. 1341: 1 Percocet given- see MAR. More juice given. Daughter remains at the bedside and call light is within reach. 1412: Patient is rating pain a \"2\"/10 and denying nausea.    1416: Discharge instructions given and explained to the patient and the patient's daughter, both verbalized understanding. 1418: IV removed at this time- no complications and dressing applied. 1420: Patient dressing. 1436: Patient stating she feels slightly dizzy and would like to rest for awhile. Daughter remains at the bedside. 1447: Patient stating she is feeling blanca and needs to use the bathroom. Patient to the bathroom at this time via wheelchair. 1453: Patient is back to her room and was able to void. Patient stating she is ready to go home. 1459: Patient wheeled to the car and discharged home in stable condition with her daughter.

## 2022-01-12 NOTE — ANESTHESIA POSTPROCEDURE EVALUATION
Department of Anesthesiology  Postprocedure Note    Patient: Roberto Arvizu  MRN: [id number]  YOB: 1960  Date of evaluation: 1/12/2022  Time:  1:36 PM     Procedure Summary     Date: 01/12/22 Room / Location: [facility] OR  / [facility]    Anesthesia Start: 1040 Anesthesia Stop: 0127    Procedure: EXCISION OF POSTERIOR CALCANEAL HEEL SPUR LEFT HEEL, REATTACHMENT OF ACHILLES TENDON ON LEFT WITH SPIDER PLATE AND SCREW (Left Ankle) Diagnosis: (ACHILLES TENDINITIS OR BURSITIS, CALCANEAL SPUR, OSTEOPHYTE, ALL ON THE LEFT)    Surgeons: Hammad Langston DPM Responsible Provider: Vickie De Los Santos MD    Anesthesia Type: general ASA Status: 3          Anesthesia Type: general    Scott Phase I: Scott Score: 9    Scott Phase II: Scott Score: 9    Last vitals: Reviewed and per EMR flowsheets.        Anesthesia Post Evaluation    Patient location during evaluation: PACU  Patient participation: complete - patient participated  Level of consciousness: awake  Airway patency: patent  Nausea & Vomiting: no vomiting and no nausea  Complications: no  Cardiovascular status: hemodynamically stable  Respiratory status: acceptable  Hydration status: stable

## 2022-01-12 NOTE — ANESTHESIA PRE PROCEDURE
Department of Anesthesiology  Preprocedure Note       Name:  Kavita Sena   Age:  60 y.o.  :  1960                                          MRN:  [id number]         Date:  2022      Surgeon: Cecelia Barrios):  Sher Morfin DPM    Procedure: Procedure(s):  EXCISION OF POSTERIOR CALCANEAL HEEL SPUR LEFT HEEL, REATTACHMENT OF ACHILLES TENDON ON LEFT WITH SPIDER PLATE AND SCREW    Medications prior to admission:   Prior to Admission medications    Medication Sig Start Date End Date Taking? Authorizing Provider   albuterol sulfate HFA (VENTOLIN HFA) 108 (90 Base) MCG/ACT inhaler Inhale 2 puffs into the lungs 4 times daily as needed for Wheezing or Shortness of Breath 1/3/22  Yes Shama Gary DO   Multiple Vitamins-Minerals (MULTIVITAMIN ADULTS PO) Take by mouth daily    Historical Provider, MD       Current medications:    Current Facility-Administered Medications   Medication Dose Route Frequency Provider Last Rate Last Admin    sodium chloride flush 0.9 % injection 10 mL  10 mL IntraVENous 2 times per day Drea Labrador, DPM        sodium chloride flush 0.9 % injection 10 mL  10 mL IntraVENous PRN Drea Labrador, DPM        0.9 % sodium chloride infusion  25 mL IntraVENous PRN Drea Labrador, DPM        ceFAZolin (ANCEF) 2000 mg in dextrose 4 % 100 mL IVPB (premix)  2,000 mg IntraVENous On Call to [address] [place] [address], DP           Allergies:     Allergies   Allergen Reactions    Sulfa Antibiotics Swelling     Feet swelled       Problem List:    Patient Active Problem List   Diagnosis Code    GERD (gastroesophageal reflux disease) K21.9    Adjustment disorder with depressed mood F43.21    MISHA (obstructive sleep apnea) G47.33       Past Medical History:        Diagnosis Date    COPD [facility]chronic obstructive pulmonary disease) ([facility])     GERD (gastroesophageal reflux disease) 2013    Obesity     Restless legs syndrome     Unspecified sleep apnea     no CPAP       Past Surgical History: Procedure Laterality Date    ENDOMETRIAL ABLATION      FOOT SURGERY      rt foot    TUBAL LIGATION         Social History:    Social History     Tobacco Use    Smoking status: Never Smoker    Smokeless tobacco: Never Used   Substance Use Topics    Alcohol use: Not Currently     Comment: occasionally                                Counseling given: Not Answered      Vital Signs (Current):   Vitals:    01/05/22 1452 01/12/22 0944   BP:  127/85   Pulse:  113   Resp:  16   Temp:  98.2 °F (36.8 °C)   TempSrc:  Temporal   SpO2:  94%   Weight: 246 lb (111.6 kg) 241 lb (109.3 kg)   Height: 5' 4\" (1.626 m) 5' 4\" (1.626 m)                                              BP Readings from Last 3 Encounters:   01/12/22 127/85   01/10/22 124/80   10/29/21 118/72       NPO Status: Time of last liquid consumption: 2300                        Time of last solid consumption: 2300                        Date of last liquid consumption: 01/11/22                        Date of last solid food consumption: 01/11/22    BMI:   Wt Readings from Last 3 Encounters:   01/12/22 241 lb (109.3 kg)   01/10/22 242 lb 12.8 oz (110.1 kg)   10/29/21 245 lb 6.4 oz (111.3 kg)     Body mass index is 41.37 kg/m². CBC:   Lab Results   Component Value Date    WBC 6.6 01/05/2022    RBC 4.84 01/05/2022    HGB 13.5 01/05/2022    HCT 41.5 01/05/2022    MCV 85.7 01/05/2022     01/05/2022       CMP:   Lab Results   Component Value Date     01/05/2022    K 4.0 01/05/2022     01/05/2022    CO2 21 01/05/2022    BUN 12 01/05/2022    CREATININE 0.5 01/05/2022    LABGLOM >90 01/05/2022    GLUCOSE 99 01/05/2022    PROT 7.7 01/05/2022    CALCIUM 9.5 01/05/2022    BILITOT 1.1 01/05/2022    ALKPHOS 96 01/05/2022    AST 26 01/05/2022    ALT 29 01/05/2022       POC Tests: No results for input(s): POCGLU, POCNA, POCK, POCCL, POCBUN, POCHEMO, POCHCT in the last 72 hours.     Coags: No results found for: PROTIME, INR, APTT    HCG (If Applicable): No results found for: PREGTESTUR, PREGSERUM, HCG, HCGQUANT     ABGs: No results found for: PHART, PO2ART, YNP9SII, MGO8OLC, BEART, E9XMEEIP     Type & Screen (If Applicable):  No results found for: LABABO, LABRH    Drug/Infectious Status (If Applicable):  Lab Results   Component Value Date    HEPCAB Negative 02/10/2020       COVID-19 Screening (If Applicable): No results found for: COVID19        Anesthesia Evaluation    Airway: Mallampati: II  TM distance: >3 FB   Neck ROM: full  Mouth opening: > = 3 FB Dental:          Pulmonary: breath sounds clear to auscultation  (+) COPD:  sleep apnea:                             Cardiovascular:            Rhythm: regular                      Neuro/Psych:   (+) psychiatric history:            GI/Hepatic/Renal:   (+) GERD:, morbid obesity          Endo/Other:                     Abdominal:   (+) obese,           Vascular: Other Findings:             Anesthesia Plan      general     ASA 3       Induction: intravenous. MIPS: Postoperative opioids intended and Prophylactic antiemetics administered. Anesthetic plan and risks discussed with patient. Plan discussed with CRNA.                   Claudine Poe MD   1/12/2022

## 2022-01-12 NOTE — OP NOTE
Operative Note      Patient: Jasbir Cruz  YOB: 1960  MRN: [id number]    Date of Procedure: 1/12/2022    Pre-Op Diagnosis: ACHILLES TENDINITIS OR BURSITIS, CALCANEAL SPUR, OSTEOPHYTE, ALL ON THE LEFT    Post-Op Diagnosis: Same       Procedure(s):  EXCISION OF POSTERIOR CALCANEAL HEEL SPUR LEFT HEEL, REATTACHMENT OF ACHILLES TENDON ON LEFT WITH SPIDER PLATE AND SCREW    Surgeon(s):  Nieves Frey DPM    Assistant:   Resident: Pham Da Silva DPM; Clemente Em DPM    Anesthesia: General    Estimated Blood Loss (mL): Minimal    Complications: None    Specimens:   * No specimens in log *    Implants:  Implant Name Type Inv. Item Serial No.  Lot No. LRB No. Used Action   PLATE BNE [id number] SPIDER OFFSET FOR 3.3MM SCR  PLATE BNE [id number] SPIDER OFFSET FOR 3.3MM SCR  [employer] TRAUMA-WD  Left 1 Implanted   SCREW BNE L55MM DIA4MM CANC TI SELF DRL ST NONCANNULATED  SCREW BNE L55MM DIA4MM CANC TI SELF DRL ST NONCANNULATED  [employer] [place]-WD  Left 1 Implanted         Drains: * No LDAs found *    Injectables: 30cc 1:1 mix of 0.5% marcaine plain and 1% lidocaine with epi    Suture: 3-0 vicryl, 4-0 prolene    Findings: Consistent with diagnosis    Indications: Patient is a 60 y.o. female with a chief complaint of painful Achilles tendonitis, retrocalcaneal spur who is being seen by Dr. Mitesh Rousseau and being treated for these conditions. At this time all conservative options have been exhausted and surgical intervention is necessary. The procedure has been explained to the patient and they understand the risks, benefits and possible complications including need for further surgery, infection, residual pain, DVT, PE, loss of limb, loss of life. No promises have been made as to surgical outcome. Procedure: The patient was transported from the pre-op holding to the operating room and placed in a prone position. A pneumatic thigh tourniquet was applied to the left thigh.   The left foot was then prepped and draped in the normal aspetic manner. The left foot was then exsanguinated with an esmark and the tourniquet was inflated to 300 mmHg. It should be noted that there was bleeding in the surgical field after raising the tourniquet, raising suspicion that the tourniquet had slipped. The decision was made to drop the tourniquet. Attention was directed to the posterior aspect of the patient's left leg. An incision was made midline over the Achilles tendon. The subcutaneous fat was sharply released from the peritenon exposing the Achilles tendon. The medial and lateral borders of the Achilles tendon were then visualized. A 15 blade was used to incise along the distal aspect of the Achilles tendon on the calcaneus, and then incisions were made medially and laterally along with the borders of the tendon from superior to inferior, meeting with the plantar incision. From the corners of these incisions, the Achilles tendon was released from its insertion on the calcaneus. An osteotome and mallet were used to resect the spur on the posterior aspect of the patient's heel. A power bur was used to smooth out any bony prominences that remained after utilization of the osteotome and mallet. The Achilles tendon was debrided of any residual bony flecks that remained attached, and any thick or fibrotic Achilles tendon was then sharply excised. The foot was plantarflexed, and the appropriate attachment site of the Achilles tendon was verified. A stab incision was made through the Achilles tendon at the proposed site of the placement of the spider plate. Following this, a drill was made to create a hole in the calcaneus extending from posterior to anterior. This checked under fluoroscopy to verify proper angulation of the drill hole. Following this, a 4.0 millimeter screw was placed on a 20 mm spider plate, and the screw was tightened down, adhering to plate to the tendon to the bone.   The incision was flushed with thorough loss of sterile saline. Deep closure was begun using 3-0 Vicryl. At this point in time, the tendon slipped out from underneath the spider plate. The spider plate and screw were then removed. A new site for insertion of the spider plate and screw was then planned more proximally in the Achilles tendon. The new anchor site for the Achilles tendon was then determined. A 15 blade was used to make a stab incision through the Achilles tendon. Following this, the same drill bit from previous was used to drill into the calcaneus from posterior to anterior. The same spider plate and screw were then inserted into the calcaneus through the Achilles tendon again, and this was seen to more securely fix the tendon to the bone. The incision was flushed with copious amounts of sterile saline. The subcutaneous tissues were re-appoximated with 3-0 Vicryl. The skin was closed using 4-0 Prolene. A post-op injection of 30cc 1:1 mix of 0.5% marcaine plain and 1% lidocaine with epi was injected. The incision was dressed with adaptic, 4x4's, kerlix, etc.  A Breg Achilles tendon walking boot was then applied. The patient was transported to the PACU with VSS and NVS intact to all digits of the left foot. No complications were noted throughout the procedure. The patient is to be discharged per PACU protocol. The patient is to follow-up with Dr. Reji Mckeon in the office.     Dr. Reji Mckeon DPM    Electronically signed by Bette Oates DPM on 1/12/2022 at 12:32 PM

## 2022-01-12 NOTE — H&P
[address] [address]  History and Physical Update    Pt Name: Min Leach  MRN: [id number]  YOB: 1960  Date of evaluation: 1/12/2022    [x] I have examined the patient and reviewed the H&P/Consult and there are no changes to the patient or plans.     [] I have examined the patient and reviewed the H&P/Consult and have noted the following changes:        Sergio Olea DPM DPM  Electronically signed 1/12/2022 at 7:15 AM

## 2022-02-21 ENCOUNTER — NURSE ONLY (OUTPATIENT)
Dept: LAB | Age: 62
End: 2022-02-21

## 2022-02-21 LAB — VITAMIN D 25-HYDROXY: 24 NG/ML (ref 30–100)

## 2022-02-28 ENCOUNTER — HOSPITAL ENCOUNTER (OUTPATIENT)
Dept: PHYSICAL THERAPY | Age: 62
Setting detail: THERAPIES SERIES
Discharge: HOME | End: 2022-02-28
Payer: COMMERCIAL

## 2022-02-28 PROCEDURE — 97110 THERAPEUTIC EXERCISES: CPT

## 2022-02-28 PROCEDURE — 97162 PT EVAL MOD COMPLEX 30 MIN: CPT

## 2022-02-28 NOTE — PROGRESS NOTES
** PLEASE PLEASE SIGN, DATE AND TIME CERTIFICATION BELOW AND RETURN TO [facility] OUTPATIENT REHABILITATION (FAX #: [phone]). ATTEST/CO-SIGN IF ACCESSING VIA [employer]. THANK YOU.**    I certify that I have examined the patient below and determined that Physical Medicine and Rehabilitation service is necessary and that I approve the established plan of care for up to 90 days or as specifically noted.   Attestation, signature or co-signature of physician indicates approval of certification requirements.    ________________________ ____________ __________  Physician Signature   Date   Time  [address] [facility]  PHYSICAL THERAPY  [x] EVALUATION  [] DAILY NOTE (LAND) [] DAILY NOTE (AQUATIC ) [] PROGRESS NOTE [] DISCHARGE NOTE    [x] [address] [facility]   [] [place] [address]    [] [address] [facility]   [facility]    Date: 2022  Patient Name:  Katelynn Ley  : 1960  MRN: [id number]  CSN: [id number]    Referring Practitioner Ame Lo DPM   Diagnosis Achilles tendinitis, left leg [M76.62]  Calcaneal spur, left foot [M77.32]  Osteophyte, left foot [M25.775]  Plantar fascial fibromatosis [M72.2]    Treatment Diagnosis Pain in L ankle, decreased L ankle ROM, decreased L ankle strength, impaired balance, abnormal gait    Date of Evaluation 22    Additional Pertinent History COPD, obesity, arthritis, memory issues, SOB       Functional Outcome Measure Used [facility]    Functional Outcome Score 38/84 (22)       Insurance: Primary: Payor: Joanna Robertson 150 /  /  / ,   Secondary:    Authorization Information: PRE CERTIFICATION REQUIRED: NO   INSURANCE THERAPY BENEFIT:  27 VISITS OF PT PER CALENDAR YEAR, SOFT LIMIT   AQUATIC THERAPY COVERED: YES  MODALITIES COVERED:  YES, NO MASSAGE    Visit # 1, 1/10 for progress note   Visits Allowed: 30 visits per year    Recertification Date:    Physician Follow-Up:    Physician Orders:    History of Present Illness: Patient reports that she had a surgery on L foot on 1/12/22. Patient reports that they removed a bone spur and reattached the achilles tendon with spider plate and screw. Patient reports that she was in a walking boot after surgery. Patient was using a walker at first to walk. Patient reports that she was in the walking boot for about 6 weeks. Patient is now WBAT in a tennis shoe. SUBJECTIVE: Patient is not using any AD at this time. Patient is still using ice on and off. Patient is not wearing an ankle brace. Patient has difficulty with standing and walking. Patient does report swelling in her L ankle still. Social/Functional History and Current Status:  Medications and Allergies have been reviewed and are listed on Medical History Questionnaire. Matthieu Styles lives alone in a multiple floor home with stairs and a handrail to enter. Task Previous Current   ADLs  Independent Modified Independent   IADL's Independent Modified Independent   Ambulation Independent Modified Independent   Transfers Independent Modified Independent   Recreation Independent Dependent/Unable, play with grandchildren    Community Integration Independent Modified Independent   Driving Active  Active    Work Part-Time. Occupation: Guest service desk, sitting, pushing a wheelchair   Off Work Due to Injury.          Objective:    GENERAL   Pain 2-3/10 pain in L ankle/foot, 4-5/10 pain at most in the past week    Observation Incision is healed, no signs of infection, dry skin noted on entire lower limb and L ankle/foot   Palpation Tenderness noted over posterior ankle near achilles and lateral ankle under malleolus   Sensation Light touch intact B LEs   Edema Edema noted in L ankle joint    Accessory Motion Accessory motion normal L foot, restricted L ankle in all directions        LOWER EXTREMITY RANGE OF MOTION    Left Right Comments   Knee Flexion      Knee Extension      Knee Range of Motion is [facility]  [x] Ankle Plantarflexion 35 50    Ankle Dorsiflexion 12 lacking 5    Ankle Inversion 20 25    Ankle Eversion 15 20    Toe Flexion      Toe Extension      Ankle Range of Motion is [facility]  []        Toe Range of Motion is [facility]  [x]       LOWER EXTREMITY STRENGTH    Left Right Comments   Hip Flexion 4- 5    Knee Flexion 5 5    Knee Extension 5 5    Ankle DF 3- 5    Ankle PF 3- 5    Ankle Inversion 3- 5    Ankle Eversion 3- 5    Toe Flexion 4 5    Toe Extension 4 5    LE strength is [facility]  []            SPECIAL TESTS (+/-)    Left Right Comments   Shelbie -     Gastroc Length +     Soleus Length +       GAIT ANALYSIS: Patient ambulates with no AD with decreased heel strike L, decreased push off L, decreased aarti, decreased step length. BALANCE/PROPRIOCEPTION          Single leg stance                                          Left Right Pain/Comments   Eyes open                                    1 Sec       4  Sec No UE support        FUNCTIONAL TESTS    Pain No Pain Comments   Stair navigation x  Patient negotiates steps with non-reciprocal gait pattern with HRs       TREATMENT   Precautions:    Pain: 2-3/10 pain in L ankle     X in shaded column indicates activity completed today   Modalities Parameters/  Location  Notes                     Manual Therapy Time/Technique  Notes                     Exercise/Intervention   Notes   Ankle AROM DF/PF   x Patient educated on the following for HEP. Handout provided. Ankle circles   x    Ankle ABCs   x    Toe curls   x    gastroc stretch    x    Seated HR/TR                                            Specific Interventions Next Treatment: L ankle ROM, 4 way ankle, L ankle/foot strengthening, gastroc/soleus stretch, gait training, step training, SLS-balance exercises, modalities as needed.      Activity/Treatment Tolerance:  [x]  Patient tolerated treatment well  []  Patient limited by fatigue  []  Patient limited by pain   []  Patient limited by medical complications  [] Other:     Assessment: 60year old female presents status post L heel spur removal and achilles tendon reattachment on 1/12/22. Patient has pain in L ankle, decreased L ankle ROM, decreased L ankle strength, gait deviations, impaired balance, and tightness in L ankle limiting the patients ability to perform daily tasks and work tasks. Patient would benefit from skilled PT to improve pain, ROM, tissue extensibility, strength, gait and balance to allow improved functional mobility. Patient educated on benefit of PT and PT POC with patient in agreement. Body Structures/Functions/Activity Limitations: edema, impaired activity tolerance, impaired balance, impaired ROM, impaired strength, pain and abnormal gait  Prognosis: good    GOALS:  Patient Goal: to improve pain and mobility of L ankle, return to work     Short Term Goals: 4 weeks  1. Patient will demonstrate increased L ankle DF AROM from 12 lacking degrees to 0 degrees to allow normalized gait pattern and decrease pain with walking. 2. Patient will demonstrate increased L ankle PF AROM from 35 degrees to 50 degrees to allow normalized gait pattern and decrease pain with walking. 3. Patient will demonstrate a decrease in L ankle pain to 3/10 at most to allow patient to return to work. 4. Patient will demonstrate an increase in L ankle strength to 5/5 to improve functional abilities. 5. Patient will be able to perform L SLS for 8 seconds to improve balance needed to negotiate uneven terrain in the community. 6. Patient will be able to ambulate with no AD and without deviation to allow improved ability to perform work tasks. Long Term Goals: 8 weeks  1. Patient will be independent with HEP in order to prevent re-injury and improve functional abilities. 2. Patient will improve FAAM score from 38/84 to 55/84 to allow improved functional mobility and ease of recreational activities.     Patient Education:   [x]  HEP/Education Completed: Plan of Care, Goals, benefit of PT, attendance policy, HEP handout    [employer] Access Code:  [email or web address]  []  No new Education completed  []  Reviewed Prior HEP      [x]  Patient verbalized and/or demonstrated understanding of education provided. []  Patient unable to verbalize and/or demonstrate understanding of education provided. Will continue education. []  Barriers to learning:     PLAN:  Treatment Recommendations: Strengthening, Range of Motion, Balance Training, Gait Training, Stair Training, Neuromuscular Re-education, Manual Therapy - Soft Tissue Mobilization, Manual Therapy - Joint Manipulation, Pain Management, Home Exercise Program, Patient Education, Aquatics and Modalities    [x]  Plan of care initiated. Plan to see patient 2-3 times per week for 8 weeks to address the treatment planned outlined above.   []  Continue with current plan of care  []  Modify plan of care as follows:    []  Hold pending physician visit  []  Discharge    Time In 1118   Time Out 1205   Timed Code Minutes: 8 min   Total Treatment Time: 47 min     Electronically Signed by: Derek Avelar PT

## 2022-03-02 ENCOUNTER — HOSPITAL ENCOUNTER (OUTPATIENT)
Dept: PHYSICAL THERAPY | Age: 62
Setting detail: THERAPIES SERIES
Discharge: HOME | End: 2022-03-02
Payer: COMMERCIAL

## 2022-03-02 PROCEDURE — 97110 THERAPEUTIC EXERCISES: CPT

## 2022-03-02 NOTE — PROGRESS NOTES
[address] [facility]  PHYSICAL THERAPY  [x] DAILY NOTE (LAND) [] DAILY NOTE (AQUATIC ) [] PROGRESS NOTE [] DISCHARGE NOTE    [x] OUTPATIENT REHABILITATION CENTER [facility]   [] [place] [address]    [] [facility]   [] [place]    Date: 3/2/2022  Patient Name:  Jay Duarte  : 1960  MRN: [id number]  CSN: [id number]    Referring Practitioner Portia Brennan DPM   Diagnosis Achilles tendinitis, left leg [M76.62]  Calcaneal spur, left foot [M77.32]  Osteophyte, left foot [M25.775]  Plantar fascial fibromatosis [M72.2]    Treatment Diagnosis Pain in L ankle, decreased L ankle ROM, decreased L ankle strength, impaired balance, abnormal gait    Date of Evaluation 22    Additional Pertinent History COPD, obesity, arthritis, memory issues, SOB       Functional Outcome Measure Used FAA    Functional Outcome Score 38/84 (22)       Insurance: Primary: Payor: Diane Timmons /  /  / ,   Secondary:    Authorization Information: PRE CERTIFICATION REQUIRED: NO   INSURANCE THERAPY BENEFIT:  27 VISITS OF PT PER CALENDAR YEAR, SOFT LIMIT   AQUATIC THERAPY COVERED: YES  MODALITIES COVERED:  YES, NO MASSAGE    Visit # 1, 1/10 for progress note   Visits Allowed: 30 visits per year    Recertification Date:    Physician Follow-Up:    Physician Orders:    History of Present Illness: Patient reports that she had a surgery on L foot on 22. Patient reports that they removed a bone spur and reattached the achilles tendon with spider plate and screw. Patient reports that she was in a walking boot after surgery. Patient was using a walker at first to walk. Patient reports that she was in the walking boot for about 6 weeks. Patient is now WBAT in a tennis shoe. SUBJECTIVE: Patient reporting pain level 2/10 today and 5/10 when up and moving.     Objective:           TREATMENT   Precautions:    Pain: 210 pain in L ankle     X in shaded column indicates activity completed today Modalities Parameters/  Location  Notes                     Manual Therapy Time/Technique  Notes   PROM: ankle pf/df, iv/er and toe flex/ex 10 x                Exercise/Intervention   Notes   Ankle AROM DF/PF 15  x Patient educated on the following for HEP. Handout provided. Ankle circles 15  x    Ankle ABCs 1x  x    Toe curls 15  x    gastroc stretch  3x20  x    Seated HR/TR 10  x    Arch Lifts 10  x    Seated wooden BAPS ( 4 directions) 10  x    Seated Rocker Board (2 directions)   x    4 way ankle isometrics  5x5  x    Seated calf stretch with strap  3x20  x    4 way ankle with peach band  10  x                           Specific Interventions Next Treatment: L ankle ROM, 4 way ankle, L ankle/foot strengthening, gastroc/soleus stretch, gait training, step training, SLS-balance exercises, modalities as needed. Activity/Treatment Tolerance:  [x]  Patient tolerated treatment well  []  Patient limited by fatigue  []  Patient limited by pain   []  Patient limited by medical complications  []  Other:     Assessment: Progressed reps as noted with patient tolerating well. Did feel like ankle was not as tight at end of session today. Prognosis: good    GOALS:  Patient Goal: to improve pain and mobility of L ankle, return to work     Short Term Goals: 4 weeks  1. Patient will demonstrate increased L ankle DF AROM from 12 lacking degrees to 0 degrees to allow normalized gait pattern and decrease pain with walking. 2. Patient will demonstrate increased L ankle PF AROM from 35 degrees to 50 degrees to allow normalized gait pattern and decrease pain with walking. 3. Patient will demonstrate a decrease in L ankle pain to 3/10 at most to allow patient to return to work. 4. Patient will demonstrate an increase in L ankle strength to 5/5 to improve functional abilities. 5. Patient will be able to perform L SLS for 8 seconds to improve balance needed to negotiate uneven terrain in the community.   6. Patient will be able to ambulate with no AD and without deviation to allow improved ability to perform work tasks. Long Term Goals: 8 weeks  1. Patient will be independent with HEP in order to prevent re-injury and improve functional abilities. 2. Patient will improve FAAM score from 38/84 to 55/84 to allow improved functional mobility and ease of recreational activities. Patient Education:   [x]  HEP/Education Completed: hand out given for 4 way ankle with peach band given as well. Talked about compression sock for swelling.  [employer] Access Code:  [email or web address], [email or web address]  []  No new Education completed  []  Reviewed Prior HEP      [x]  Patient verbalized and/or demonstrated understanding of education provided. []  Patient unable to verbalize and/or demonstrate understanding of education provided. Will continue education. []  Barriers to learning:     PLAN:  Treatment Recommendations: Strengthening, Range of Motion, Balance Training, Gait Training, Stair Training, Neuromuscular Re-education, Manual Therapy - Soft Tissue Mobilization, Manual Therapy - Joint Manipulation, Pain Management, Home Exercise Program, Patient Education, Aquatics and Modalities    Plan to see patient 2-3 times per week for 8 weeks to address the treatment planned outlined above.   []  Continue with current plan of care  []  Modify plan of care as follows:    []  Hold pending physician visit  []  Discharge    Time In 1357   Time Out 1423   Timed Code Minutes: 26 min   Total Treatment Time: 26 min     Electronically Signed by: Garett Guo PTA

## 2022-03-07 ENCOUNTER — HOSPITAL ENCOUNTER (OUTPATIENT)
Dept: PHYSICAL THERAPY | Age: 62
Setting detail: THERAPIES SERIES
Discharge: HOME | End: 2022-03-07
Payer: COMMERCIAL

## 2022-03-07 PROCEDURE — 97110 THERAPEUTIC EXERCISES: CPT

## 2022-03-07 NOTE — PROGRESS NOTES
[address] [facility]  PHYSICAL THERAPY  [x] DAILY NOTE (LAND) [] DAILY NOTE (AQUATIC ) [] PROGRESS NOTE [] DISCHARGE NOTE    [x] OUTPATIENT REHABILITATION CENTER [facility]   [] [place] [address]    [] [facility]   [] Gagandeep Beltran    Date: 3/7/2022  Patient Name:  Min Leach  : 1960  MRN: [id number]  CSN: [id number]    Referring Practitioner Jaci Rinaldi DPM   Diagnosis Achilles tendinitis, left leg [M76.62]  Calcaneal spur, left foot [M77.32]  Osteophyte, left foot [M25.775]  Plantar fascial fibromatosis [M72.2]    Treatment Diagnosis Pain in L ankle, decreased L ankle ROM, decreased L ankle strength, impaired balance, abnormal gait    Date of Evaluation 22    Additional Pertinent History COPD, obesity, arthritis, memory issues, SOB       Functional Outcome Measure Used FAA    Functional Outcome Score 38/84 (22)       Insurance: Primary: Payor: Ngozi Ocasio /  /  / ,   Secondary:    Authorization Information: PRE CERTIFICATION REQUIRED: NO   INSURANCE THERAPY BENEFIT:  27 VISITS OF PT PER CALENDAR YEAR, SOFT LIMIT   AQUATIC THERAPY COVERED: YES  MODALITIES COVERED:  YES, NO MASSAGE    Visit # 1, 1/10 for progress note   Visits Allowed: 30 visits per year    Recertification Date:    Physician Follow-Up:    Physician Orders:    History of Present Illness: Patient reports that she had a surgery on L foot on 22. Patient reports that they removed a bone spur and reattached the achilles tendon with spider plate and screw. Patient reports that she was in a walking boot after surgery. Patient was using a walker at first to walk. Patient reports that she was in the walking boot for about 6 weeks. Patient is now WBAT in a tennis shoe. SUBJECTIVE: Patient reporting pain level 2/10 today and 5/10 when up and moving.     Objective:           TREATMENT   Precautions:    Pain: 210 pain in L ankle     X in shaded column indicates activity completed today Modalities Parameters/  Location  Notes                     Manual Therapy Time/Technique  Notes   PROM: ankle pf/df, iv/er and toe flex/ex 10 x                Exercise/Intervention   Notes   NuStep Level 2 5 min  x    Ankle AROM DF/PF,Circles, Toe flex/ext 15  x Patient educated on the following for HEP. Handout provided. Ankle ABCs 1x  x    gastroc stretch  3x20  x    Seated HR/TR 10  x    Arch Lifts, big to lift and then lifting other toes 10  x    Seated wooden BAPS ( 4 directions) 15  x    Seated Rocker Board (2 directions) 15  x    Seated calf stretch with strap  3x20  x    4 way ankle with peach band  10  x    Lunge stretch for calf and plantar fascia stretch at end of // bars 3x20  x    Weight shifts f/b and s/s 15  x    Rocking forward/back working on heel/toe  15  x           Gait: gait forward heel/toe motion and retro 1-2 for 20 feet  x      Specific Interventions Next Treatment: L ankle ROM, 4 way ankle, L ankle/foot strengthening, gastroc/soleus stretch, gait training, step training, SLS-balance exercises, modalities as needed. Activity/Treatment Tolerance:  [x]  Patient tolerated treatment well  []  Patient limited by fatigue  []  Patient limited by pain   []  Patient limited by medical complications  []  Other:     Assessment: Continued to make progressions for strength and range of motion with patient tolerating well. Did work a little more on gait with heel/toe motion. Patient reporting pain level 2/10 at end of session. GOALS:  Patient Goal: to improve pain and mobility of L ankle, return to work     Short Term Goals: 4 weeks  1. Patient will demonstrate increased L ankle DF AROM from 12 lacking degrees to 0 degrees to allow normalized gait pattern and decrease pain with walking. 2. Patient will demonstrate increased L ankle PF AROM from 35 degrees to 50 degrees to allow normalized gait pattern and decrease pain with walking.   3. Patient will demonstrate a decrease in L ankle pain to 3/10 at most to allow patient to return to work. 4. Patient will demonstrate an increase in L ankle strength to 5/5 to improve functional abilities. 5. Patient will be able to perform L SLS for 8 seconds to improve balance needed to negotiate uneven terrain in the community. 6. Patient will be able to ambulate with no AD and without deviation to allow improved ability to perform work tasks. Long Term Goals: 8 weeks  1. Patient will be independent with HEP in order to prevent re-injury and improve functional abilities. 2. Patient will improve FAAM score from 38/84 to 55/84 to allow improved functional mobility and ease of recreational activities. Patient Education:   []  HEP/Education Completed: hand out given for 4 way ankle with peach band given as well. Talked about compression sock for swelling. [employer] Access Code:  [email or web address], [email or web address]  []  No new Education completed  [x]  Reviewed Prior HEP      [x]  Patient verbalized and/or demonstrated understanding of education provided. []  Patient unable to verbalize and/or demonstrate understanding of education provided. Will continue education. []  Barriers to learning:     PLAN:  Treatment Recommendations: Strengthening, Range of Motion, Balance Training, Gait Training, Stair Training, Neuromuscular Re-education, Manual Therapy - Soft Tissue Mobilization, Manual Therapy - Joint Manipulation, Pain Management, Home Exercise Program, Patient Education, Aquatics and Modalities    Plan to see patient 2-3 times per week for 8 weeks to address the treatment planned outlined above.   [x]  Continue with current plan of care  []  Modify plan of care as follows:    []  Hold pending physician visit  []  Discharge    Time In 1144   Time Out 1223   Timed Code Minutes: 39 min   Total Treatment Time: 39 min     Electronically Signed by: Chandni Bryant PTA

## 2022-03-09 ENCOUNTER — HOSPITAL ENCOUNTER (OUTPATIENT)
Dept: PHYSICAL THERAPY | Age: 62
Setting detail: THERAPIES SERIES
Discharge: HOME | End: 2022-03-09
Payer: COMMERCIAL

## 2022-03-09 PROCEDURE — 97110 THERAPEUTIC EXERCISES: CPT

## 2022-03-09 NOTE — PROGRESS NOTES
[facility]  PHYSICAL THERAPY  [x] DAILY NOTE (LAND) [] DAILY NOTE (AQUATIC ) [] PROGRESS NOTE [] DISCHARGE NOTE    [x] OUTPATIENT REHABILITATION CENTER [facility]   [] [place] [address]    [] [facility]   [] [facility]    Date: 3/9/2022  Patient Name:  Марина Archibald  : 1960  MRN: [id number]  CSN: [id number]    Referring Practitioner Gilberto Abbott DPM   Diagnosis Achilles tendinitis, left leg [M76.62]  Calcaneal spur, left foot [M77.32]  Osteophyte, left foot [M25.775]  Plantar fascial fibromatosis [M72.2]    Treatment Diagnosis Pain in L ankle, decreased L ankle ROM, decreased L ankle strength, impaired balance, abnormal gait    Date of Evaluation 22    Additional Pertinent History COPD, obesity, arthritis, memory issues, SOB       Functional Outcome Measure Used FAA    Functional Outcome Score 38/84 (22)       Insurance: Primary: Payor: Joanna Robertson 150 /  /  / ,   Secondary:    Authorization Information: PRE CERTIFICATION REQUIRED: NO   INSURANCE THERAPY BENEFIT:  30 VISITS OF PT PER CALENDAR YEAR, SOFT LIMIT   AQUATIC THERAPY COVERED: YES  MODALITIES COVERED:  YES, NO MASSAGE    Visit # 4, 4/10 for progress note   Visits Allowed: 30 visits per year    Recertification Date: 76   Physician Follow-Up:    Physician Orders:    History of Present Illness: Patient reports that she had a surgery on L foot on 22. Patient reports that they removed a bone spur and reattached the achilles tendon with spider plate and screw. Patient reports that she was in a walking boot after surgery. Patient was using a walker at first to walk. Patient reports that she was in the walking boot for about 6 weeks. Patient is now WBAT in a tennis shoe. SUBJECTIVE: Patient reporting pain level 2/10 and stating that she did get a compression sock and today is the first day she has been wearing it.     Objective:        TREATMENT   Precautions:    Pain: 210 pain in L ankle X in shaded column indicates activity completed today   Modalities Parameters/  Location  Notes                     Manual Therapy Time/Technique  Notes   PROM: ankle pf/df, iv/er and toe flex/ex 10 x                Exercise/Intervention   Notes   NuStep Level 2 6 min  x    Ankle AROM DF/PF,Circles. 1# ankle weight on foot 15  x    Ankle ABCs 1# ankle weight on foot 1x  x    gastroc stretch  3x20      Seated HR/TR 15  x    Arch Lifts, big to lift and then lifting other toes. Toe flex/ext 15  x    Seated wooden BAPS ( 4 directions) 15  x    Seated Rocker Board (2 directions) 15  x    Seated calf stretch with strap  3x20  x    4 way ankle with peach band  10  x    Lunge stretch for calf and plantar fascia stretch at end of // bars 3x20  x    Weight shifts f/b and s/s 15  x    Rocking forward/back working on heel/toe  15  x    Foam: narrow stance, step stance  30 sec each  x    Gait: gait forward heel/toe motion and retro,  Side step 2 for 20 feet  x      Specific Interventions Next Treatment: L ankle ROM, 4 way ankle, L ankle/foot strengthening, gastroc/soleus stretch, gait training, step training, SLS-balance exercises, modalities as needed. Activity/Treatment Tolerance:  [x]  Patient tolerated treatment well  []  Patient limited by fatigue  []  Patient limited by pain   []  Patient limited by medical complications  []  Other:     Assessment: Continued to progressions patient as noted above with patient tolerating well. Does still having some swelling in foot/ankle and some weakness as well. Patient reporting pain level still 2/10 at end of session and having no other complains. GOALS:  Patient Goal: to improve pain and mobility of L ankle, return to work     Short Term Goals: 4 weeks  1. Patient will demonstrate increased L ankle DF AROM from 12 lacking degrees to 0 degrees to allow normalized gait pattern and decrease pain with walking.   2. Patient will demonstrate increased L ankle PF AROM from 35 Electronically Signed by: Enoc Morton, PTA

## 2022-03-14 ENCOUNTER — HOSPITAL ENCOUNTER (OUTPATIENT)
Dept: PHYSICAL THERAPY | Age: 62
Setting detail: THERAPIES SERIES
Discharge: HOME | End: 2022-03-14
Payer: COMMERCIAL

## 2022-03-14 PROCEDURE — 97110 THERAPEUTIC EXERCISES: CPT

## 2022-03-14 NOTE — PROGRESS NOTES
[address] [facility]  PHYSICAL THERAPY  [x] DAILY NOTE (LAND) [] DAILY NOTE (AQUATIC ) [] PROGRESS NOTE [] DISCHARGE NOTE    [x] OUTPATIENT REHABILITATION CENTER [facility]   [] [place] [address]    [] [facility]   [] Makenna Corral    Date: 3/14/2022  Patient Name:  Bal Diamond  : 1960  MRN: [id number]  CSN: [id number]    Referring Practitioner Milena Argueta DPM   Diagnosis Achilles tendinitis, left leg [M76.62]  Calcaneal spur, left foot [M77.32]  Osteophyte, left foot [M25.775]  Plantar fascial fibromatosis [M72.2]    Treatment Diagnosis Pain in L ankle, decreased L ankle ROM, decreased L ankle strength, impaired balance, abnormal gait    Date of Evaluation 22    Additional Pertinent History COPD, obesity, arthritis, memory issues, SOB       Functional Outcome Measure Used [facility]    Functional Outcome Score 38/84 (22)       Insurance: Primary: Payor: Radha Juarez /  /  / ,   Secondary:    Authorization Information: PRE CERTIFICATION REQUIRED: NO   INSURANCE THERAPY BENEFIT:  30 VISITS OF PT PER CALENDAR YEAR, SOFT LIMIT   AQUATIC THERAPY COVERED: YES  MODALITIES COVERED:  YES, NO MASSAGE    Visit # 5, 5/10 for progress note   Visits Allowed: 30 visits per year    Recertification Date: 26   Physician Follow-Up:    Physician Orders:    History of Present Illness: Patient reports that she had a surgery on L foot on 22. Patient reports that they removed a bone spur and reattached the achilles tendon with spider plate and screw. Patient reports that she was in a walking boot after surgery. Patient was using a walker at first to walk. Patient reports that she was in the walking boot for about 6 weeks. Patient is now WBAT in a tennis shoe. SUBJECTIVE: Patient reports 4/10 pain in L ankle upon arrival. Patient reports that she did not sleep that well last night and she is unsure why her pain is more sore today.      Objective:        TREATMENT   Precautions: Pain: 4/10 pain in L ankle     X in shaded column indicates activity completed today   Modalities Parameters/  Location  Notes                     Manual Therapy Time/Technique  Notes   PROM: ankle pf/df, iv/er and toe flex/ex 10 x                Exercise/Intervention   Notes   NuStep Level 4 6 min  x    Ankle AROM DF/PF, inv/evr, Circles. 1# ankle weight on foot 15  x    Ankle ABCs 1# ankle weight on foot 1x  x    gastroc stretch  3x20  x    Seated HR/TR 15  x    Arch Lifts, big to lift and then lifting other toes. Toe flex/ext 15  x    Seated wooden BAPS ( 4 directions) 15  x    Seated Rocker Board (2 directions) 15  x    Seated calf stretch with strap  3x20  x    4 way ankle with peach band  10  x    Lunge stretch for calf and plantar fascia stretch at end of // bars 3x20  x    Weight shifts f/b and s/s 15  x    Rocking forward/back working on heel/toe  15  x    Foam: narrow stance, step stance  30 sec each  x    Gait: gait forward heel/toe motion and retro,  Side step 2 for 20 feet  x      Specific Interventions Next Treatment: L ankle ROM, 4 way ankle, L ankle/foot strengthening, gastroc/soleus stretch, gait training, step training, SLS-balance exercises, modalities as needed. Activity/Treatment Tolerance:  [x]  Patient tolerated treatment well  []  Patient limited by fatigue  []  Patient limited by pain   []  Patient limited by medical complications  []  Other:     Assessment: Patient continues exercises as seen above for ankle ROM and strengthening. Patient does report some soreness after the session. Patient requiring instruction for correct exercise technique and gait mechanics. Improved gait after instruction. Will progress patient as able to improve functional mobility. GOALS:  Patient Goal: to improve pain and mobility of L ankle, return to work     Short Term Goals: 4 weeks  1.  Patient will demonstrate increased L ankle DF AROM from 12 lacking degrees to 0 degrees to allow normalized gait pattern and decrease pain with walking. 2. Patient will demonstrate increased L ankle PF AROM from 35 degrees to 50 degrees to allow normalized gait pattern and decrease pain with walking. 3. Patient will demonstrate a decrease in L ankle pain to 3/10 at most to allow patient to return to work. 4. Patient will demonstrate an increase in L ankle strength to 5/5 to improve functional abilities. 5. Patient will be able to perform L SLS for 8 seconds to improve balance needed to negotiate uneven terrain in the community. 6. Patient will be able to ambulate with no AD and without deviation to allow improved ability to perform work tasks. Long Term Goals: 8 weeks  1. Patient will be independent with HEP in order to prevent re-injury and improve functional abilities. 2. Patient will improve FAAM score from 38/84 to 55/84 to allow improved functional mobility and ease of recreational activities. Patient Education:   [x]  HEP/Education Completed: use of ice and elevation today to decrease soreness.  [employer] Access Code:  [email or web address], [id number]  []  No new Education completed  []  Reviewed Prior HEP      [x]  Patient verbalized and/or demonstrated understanding of education provided. []  Patient unable to verbalize and/or demonstrate understanding of education provided. Will continue education. []  Barriers to learning:     PLAN:  Treatment Recommendations: Strengthening, Range of Motion, Balance Training, Gait Training, Stair Training, Neuromuscular Re-education, Manual Therapy - Soft Tissue Mobilization, Manual Therapy - Joint Manipulation, Pain Management, Home Exercise Program, Patient Education, Aquatics and Modalities    Plan to see patient 2-3 times per week for 8 weeks to address the treatment planned outlined above.   [x]  Continue with current plan of care  []  Modify plan of care as follows:    []  Hold pending physician visit  []  Discharge    Time In 1130   Time Out 1213   Timed Code Minutes: 43 min   Total Treatment Time: 43 min     Electronically Signed by: Forest Obrien PT

## 2022-03-16 ENCOUNTER — HOSPITAL ENCOUNTER (OUTPATIENT)
Dept: PHYSICAL THERAPY | Age: 62
Setting detail: THERAPIES SERIES
Discharge: HOME | End: 2022-03-16
Payer: COMMERCIAL

## 2022-03-16 PROCEDURE — 97110 THERAPEUTIC EXERCISES: CPT

## 2022-03-16 NOTE — PROGRESS NOTES
[address] [facility]  PHYSICAL THERAPY  [x] DAILY NOTE (LAND) [] DAILY NOTE (AQUATIC ) [] PROGRESS NOTE [] DISCHARGE NOTE    [x] OUTPATIENT REHABILITATION CENTER [facility]   [] [place] [address]    [] [facility]   [] [place] [facility]    Date: 3/16/2022  Patient Name:  Beny Hsieh  : 1960  MRN: [id number]  CSN: [id number]    Referring Practitioner Bedelia Needs, DPM   Diagnosis Achilles tendinitis, left leg [M76.62]  Calcaneal spur, left foot [M77.32]  Osteophyte, left foot [M25.775]  Plantar fascial fibromatosis [M72.2]    Treatment Diagnosis Pain in L ankle, decreased L ankle ROM, decreased L ankle strength, impaired balance, abnormal gait    Date of Evaluation 22    Additional Pertinent History COPD, obesity, arthritis, memory issues, SOB       Functional Outcome Measure Used FAA    Functional Outcome Score 38/84 (22)       Insurance: Primary: Payor: Joanna Robertson 150 /  /  / ,   Secondary:    Authorization Information: PRE CERTIFICATION REQUIRED: NO   INSURANCE THERAPY BENEFIT:  30 VISITS OF PT PER CALENDAR YEAR, SOFT LIMIT   AQUATIC THERAPY COVERED: YES  MODALITIES COVERED:  YES, NO MASSAGE    Visit # 6, 6/10 for progress note   Visits Allowed: 30 visits per year    Recertification Date:    Physician Follow-Up:    Physician Orders:    History of Present Illness: Patient reports that she had a surgery on L foot on 22. Patient reports that they removed a bone spur and reattached the achilles tendon with spider plate and screw. Patient reports that she was in a walking boot after surgery. Patient was using a walker at first to walk. Patient reports that she was in the walking boot for about 6 weeks. Patient is now WBAT in a tennis shoe. SUBJECTIVE: Patient reports pain level 2/10 today and reporting that she has to push a wheelchair when she gets back to work.      Objective:        TREATMENT   Precautions:    Pain: 2/10 pain in L ankle     X in shaded column indicates activity completed today   Modalities Parameters/  Location  Notes                     Manual Therapy Time/Technique  Notes   PROM: ankle pf/df, iv/er and toe flex/ex 10                 Exercise/Intervention   Notes   NuStep Level 4 6 min  x    Ankle AROM DF/PF, inv/evr, Circles. 1# ankle weight on foot 20  x    Ankle ABCs 1# ankle weight on foot 1x      gastroc stretch  3x20      Seated HR/TR 20  x    Arch Lifts, big to lift and then lifting other toes. Toe flex/ext 15      Standing wooden BAPS ( 4 directions) 15  x    Standing Rocker Board (2 directions) 15 30 sec bal x    Seated calf stretch with strap  3x20      4 way ankle with peach band  10      Lunge stretch for calf and plantar fascia stretch at end of // bars 3x20  x    Weight shifts f/b and s/s 15  x    Heel/Toe Raises, HS curls, marching, mini squats, 3 way hip 10  x    4inch step up. Forward/lateral . R/L 10  x    Rocking forward/back working on heel/toe  20  x    Foam: narrow stance, step stance  30 sec each  x    Gait: gait forward heel/toe motion and retro,  Side step 2 laps in // bars  x      Specific Interventions Next Treatment: L ankle ROM, 4 way ankle, L ankle/foot strengthening, gastroc/soleus stretch, gait training, step training, SLS-balance exercises, modalities as needed. Activity/Treatment Tolerance:  [x]  Patient tolerated treatment well  []  Patient limited by fatigue  []  Patient limited by pain   []  Patient limited by medical complications  []  Other:     Assessment: Progressed with more standing strengthening and balance exercises with patient tolerating well. Patient did have a little difficulty with balancing at times. Patient did report pain 1/10 at end of session and reporting no other complains. GOALS:  Patient Goal: to improve pain and mobility of L ankle, return to work     Short Term Goals: 4 weeks  1.  Patient will demonstrate increased L ankle DF AROM from 12 lacking degrees to 0 degrees to allow normalized gait pattern and decrease pain with walking. 2. Patient will demonstrate increased L ankle PF AROM from 35 degrees to 50 degrees to allow normalized gait pattern and decrease pain with walking. 3. Patient will demonstrate a decrease in L ankle pain to 3/10 at most to allow patient to return to work. 4. Patient will demonstrate an increase in L ankle strength to 5/5 to improve functional abilities. 5. Patient will be able to perform L SLS for 8 seconds to improve balance needed to negotiate uneven terrain in the community. 6. Patient will be able to ambulate with no AD and without deviation to allow improved ability to perform work tasks. Long Term Goals: 8 weeks  1. Patient will be independent with HEP in order to prevent re-injury and improve functional abilities. 2. Patient will improve FAAM score from 38/84 to 55/84 to allow improved functional mobility and ease of recreational activities. Patient Education:   [x]  HEP/Education Completed: monitor response to added standing exercises.  [employer] Access Code:  [email or web address], [email or web address]  []  No new Education completed  []  Reviewed Prior HEP      [x]  Patient verbalized and/or demonstrated understanding of education provided. []  Patient unable to verbalize and/or demonstrate understanding of education provided. Will continue education. []  Barriers to learning:     PLAN:  Treatment Recommendations: Strengthening, Range of Motion, Balance Training, Gait Training, Stair Training, Neuromuscular Re-education, Manual Therapy - Soft Tissue Mobilization, Manual Therapy - Joint Manipulation, Pain Management, Home Exercise Program, Patient Education, Aquatics and Modalities    Plan to see patient 2-3 times per week for 8 weeks to address the treatment planned outlined above.   [x]  Continue with current plan of care  []  Modify plan of care as follows:    []  Hold pending physician visit  []  Discharge    Time In 1336   Time Out 1414   Timed Code

## 2022-03-21 ENCOUNTER — HOSPITAL ENCOUNTER (OUTPATIENT)
Dept: PHYSICAL THERAPY | Age: 62
Setting detail: THERAPIES SERIES
Discharge: HOME | End: 2022-03-21
Payer: COMMERCIAL

## 2022-03-21 PROCEDURE — 97110 THERAPEUTIC EXERCISES: CPT

## 2022-03-21 NOTE — PROGRESS NOTES
[facility]  PHYSICAL THERAPY  [x] DAILY NOTE (LAND) [] DAILY NOTE (AQUATIC ) [] PROGRESS NOTE [] DISCHARGE NOTE    [x] OUTPATIENT REHABILITATION CENTER [facility]   [] [place] [address]    [] [facility]   [] [place]    Date: 3/21/2022  Patient Name:  Jade Parish  : 1960  MRN: [id number]  CSN: [id number]    Referring Practitioner Kary Barkley DPM   Diagnosis Achilles tendinitis, left leg [M76.62]  Calcaneal spur, left foot [M77.32]  Osteophyte, left foot [M25.775]  Plantar fascial fibromatosis [M72.2]    Treatment Diagnosis Pain in L ankle, decreased L ankle ROM, decreased L ankle strength, impaired balance, abnormal gait    Date of Evaluation 22    Additional Pertinent History COPD, obesity, arthritis, memory issues, SOB       Functional Outcome Measure Used FAA    Functional Outcome Score 38/84 (22)       Insurance: Primary: Payor: Sherry Khoury /  /  / ,   Secondary:    Authorization Information: PRE CERTIFICATION REQUIRED: NO   INSURANCE THERAPY BENEFIT:  30 VISITS OF PT PER CALENDAR YEAR, SOFT LIMIT   AQUATIC THERAPY COVERED: YES  MODALITIES COVERED:  YES, NO MASSAGE    Visit # 7, 7/10 for progress note   Visits Allowed: 30 visits per year    Recertification Date: 3/89/78   Physician Follow-Up:    Physician Orders:    History of Present Illness: Patient reports that she had a surgery on L foot on 22. Patient reports that they removed a bone spur and reattached the achilles tendon with spider plate and screw. Patient reports that she was in a walking boot after surgery. Patient was using a walker at first to walk. Patient reports that she was in the walking boot for about 6 weeks. Patient is now WBAT in a tennis shoe. SUBJECTIVE: Patient states that she is feeling arthritis pain today, especially in her knees. Reports she is working on exercises at home daily.       Objective:  TREATMENT   Precautions:    Pain: 2/10 Left ankle     X in shaded column indicates activity completed today   Modalities Parameters/  Location  Notes                     Manual Therapy Time/Technique  Notes   PROM: ankle pf/df, iv/er and toe flex/ex 10                 Exercise/Intervention   Notes   NuStep  6 min Level 4 X    Ankle AROM DF/PF, inv/evr, Circles. 1# ankle weight on foot 20x  X    Ankle ABCs 1# ankle weight on foot 1x  X    gastroc stretch  3x20      Seated HR/TR 20      Arch Lifts, big to lift and then lifting other toes. Toe flex/ext 15      Standing wooden BAPS ( 4 directions) 15x  X    Standing Rocker Board (2 directions) 15x 30 sec bal X    Seated calf stretch with strap  3x20      4 way ankle with peach band  10      Lunge stretch for calf and plantar fascia stretch at end of // bars 3x 20 sec  X    Weight shifts f/b and s/s 15x  X    Heel/Toe Raises, HS curls, marching, mini squats, 3-way hip 15x  X    4 inch step up. Forward/lateral  10x  X    Rocking forward/back working on heel/toe  20x  X Left foot on foam   Foam: narrow stance, step stance  1 min each  X    Gait: gait forward heel/toe motion and retro,  Side step 2 laps in // bars  X                    Specific Interventions Next Treatment: L ankle ROM, 4 way ankle, L ankle/foot strengthening, gastroc/soleus stretch, gait training, step training, SLS-balance exercises, modalities as needed. Activity/Treatment Tolerance:  [x]  Patient tolerated treatment well []  Patient limited by fatigue  []  Patient limited by pain   []  Patient limited by medical complications  []  Other:     Assessment: Patient tolerated session well with increases in repetitions as patient was able to tolerate. Patient denies increased pain at end of session and felt a little better after moving. Pain 1-2/10 and end of session. GOALS:  Patient Goal: to improve pain and mobility of L ankle, return to work     Short Term Goals: 4 weeks  1.  Patient will demonstrate increased L ankle DF AROM from 12 lacking degrees to 0 degrees to allow normalized gait pattern and decrease pain with walking. 2. Patient will demonstrate increased L ankle PF AROM from 35 degrees to 50 degrees to allow normalized gait pattern and decrease pain with walking. 3. Patient will demonstrate a decrease in L ankle pain to 3/10 at most to allow patient to return to work. 4. Patient will demonstrate an increase in L ankle strength to 5/5 to improve functional abilities. 5. Patient will be able to perform L SLS for 8 seconds to improve balance needed to negotiate uneven terrain in the community. 6. Patient will be able to ambulate with no AD and without deviation to allow improved ability to perform work tasks. Long Term Goals: 8 weeks  1. Patient will be independent with HEP in order to prevent re-injury and improve functional abilities. 2. Patient will improve FAAM score from 38/84 to 55/84 to allow improved functional mobility and ease of recreational activities. Patient Education:   [x]  HEP/Education Completed: monitor response to added standing exercises.  [employer] Access Code:  [email or web address], [id number]  []  No new Education completed  []  Reviewed Prior HEP      [x]  Patient verbalized and/or demonstrated understanding of education provided. []  Patient unable to verbalize and/or demonstrate understanding of education provided. Will continue education. []  Barriers to learning:     PLAN:  Treatment Recommendations: Strengthening, Range of Motion, Balance Training, Gait Training, Stair Training, Neuromuscular Re-education, Manual Therapy - Soft Tissue Mobilization, Manual Therapy - Joint Manipulation, Pain Management, Home Exercise Program, Patient Education, Aquatics and Modalities    Plan to see patient 2-3 times per week for 8 weeks to address the treatment planned outlined above.   [x]  Continue with current plan of care  []  Modify plan of care as follows:    []  Hold pending physician visit  []  Discharge    Time In 1130   Time Out 1210 Timed Code Minutes: 40 min   Total Treatment Time: 40 min     Electronically Signed by: Daniel Vazquez PTA

## 2022-03-23 ENCOUNTER — HOSPITAL ENCOUNTER (OUTPATIENT)
Dept: PHYSICAL THERAPY | Age: 62
Setting detail: THERAPIES SERIES
Discharge: HOME | End: 2022-03-23
Payer: COMMERCIAL

## 2022-03-23 PROCEDURE — 97110 THERAPEUTIC EXERCISES: CPT

## 2022-03-23 NOTE — PROGRESS NOTES
[address] [facility]  PHYSICAL THERAPY  [x] DAILY NOTE (LAND) [] DAILY NOTE (AQUATIC ) [] PROGRESS NOTE [] DISCHARGE NOTE    [x] OUTPATIENT REHABILITATION CENTER [facility]   [] [place] [address]    [] [facility]   [] Ryann Senioron    Date: 3/23/2022  Patient Name:  Mary Mims  : 1960  MRN: [id number]  CSN: [id number]    Referring Practitioner Trip Chung DPM   Diagnosis Achilles tendinitis, left leg [M76.62]  Calcaneal spur, left foot [M77.32]  Osteophyte, left foot [M25.775]  Plantar fascial fibromatosis [M72.2]    Treatment Diagnosis Pain in L ankle, decreased L ankle ROM, decreased L ankle strength, impaired balance, abnormal gait    Date of Evaluation 22    Additional Pertinent History COPD, obesity, arthritis, memory issues, SOB       Functional Outcome Measure Used FAA    Functional Outcome Score 38/84 (22)       Insurance: Primary: Payor: Jerome Monroe /  /  / ,   Secondary:    Authorization Information: PRE CERTIFICATION REQUIRED: NO   INSURANCE THERAPY BENEFIT:  30 VISITS OF PT PER CALENDAR YEAR, SOFT LIMIT   AQUATIC THERAPY COVERED: YES  MODALITIES COVERED:  YES, NO MASSAGE    Visit # 8, 8/10 for progress note   Visits Allowed: 30 visits per year    Recertification Date:    Physician Follow-Up:    Physician Orders:    History of Present Illness: Patient reports that she had a surgery on L foot on 22. Patient reports that they removed a bone spur and reattached the achilles tendon with spider plate and screw. Patient reports that she was in a walking boot after surgery. Patient was using a walker at first to walk. Patient reports that she was in the walking boot for about 6 weeks. Patient is now WBAT in a tennis shoe. SUBJECTIVE: Patient denies any pain upon arrival. Patient returns to work on 22. Patient has seen improvement with therapy.  Patient does have some swelling in her foot but not as much as prior to PT. Objective:  TREATMENT   Precautions:    Pain: 0/10 Left ankle     X in shaded column indicates activity completed today   Modalities Parameters/  Location  Notes                     Manual Therapy Time/Technique  Notes   PROM: ankle pf/df, iv/er and toe flex/ex 10                 Exercise/Intervention   Notes   NuStep  5 min Level 4 X    Ankle AROM DF/PF, inv/evr, Circles. 1# ankle weight on foot 20x  X    Ankle ABCs 1# ankle weight on foot 1x  X    gastroc stretch  3x20      Seated HR/TR 20      Arch Lifts, big to lift and then lifting other toes. Toe flex/ext 15      Standing wooden BAPS ( 4 directions) 15x  X    Standing Rocker Board (2 directions) 15x 30 sec bal X    Seated calf stretch with strap  3x20      4 way ankle with peach band  10      Lunge stretch for calf and plantar fascia stretch at end of // bars 3x 20 sec  X    Weight shifts f/b and s/s 15x  X    Heel/Toe Raises, HS curls, marching, mini squats, 3-way hip 15x  X    4 inch step up. Forward/lateral  15x  X    Rocking forward/back working on heel/toe  20x  X Left foot on foam   Foam: narrow stance, step stance  1 min each  X    Gait: gait forward heel/toe motion and retro,  Side step 2 laps in // bars  X    Heel raises off edge of step  10 x  x Mild pain with this             Specific Interventions Next Treatment: L ankle ROM, 4 way ankle, L ankle/foot strengthening, gastroc/soleus stretch, gait training, step training, SLS-balance exercises, modalities as needed. Activity/Treatment Tolerance:  [x]  Patient tolerated treatment well  []  Patient limited by fatigue  []  Patient limited by pain   []  Patient limited by medical complications  []  Other:     Assessment: Patient was progressed with increased repetitions and heel raises off edge of step. Patient did have some pain with heel raises off edge of step today. Patient will be progressed as able to improve functional mobility.      GOALS:  Patient Goal: to improve pain and mobility of L ankle, return to work     Short Term Goals: 4 weeks  1. Patient will demonstrate increased L ankle DF AROM from 12 lacking degrees to 0 degrees to allow normalized gait pattern and decrease pain with walking. 2. Patient will demonstrate increased L ankle PF AROM from 35 degrees to 50 degrees to allow normalized gait pattern and decrease pain with walking. 3. Patient will demonstrate a decrease in L ankle pain to 3/10 at most to allow patient to return to work. 4. Patient will demonstrate an increase in L ankle strength to 5/5 to improve functional abilities. 5. Patient will be able to perform L SLS for 8 seconds to improve balance needed to negotiate uneven terrain in the community. 6. Patient will be able to ambulate with no AD and without deviation to allow improved ability to perform work tasks. Long Term Goals: 8 weeks  1. Patient will be independent with HEP in order to prevent re-injury and improve functional abilities. 2. Patient will improve FAAM score from 38/84 to 55/84 to allow improved functional mobility and ease of recreational activities. Patient Education:   [x]  HEP/Education Completed: continue HEP, correct exercise technique    [facility] Access Code:  [email or web address], [email or web address]  []  No new Education completed  []  Reviewed Prior HEP      [x]  Patient verbalized and/or demonstrated understanding of education provided. []  Patient unable to verbalize and/or demonstrate understanding of education provided. Will continue education. []  Barriers to learning:     PLAN:  Treatment Recommendations: Strengthening, Range of Motion, Balance Training, Gait Training, Stair Training, Neuromuscular Re-education, Manual Therapy - Soft Tissue Mobilization, Manual Therapy - Joint Manipulation, Pain Management, Home Exercise Program, Patient Education, Aquatics and Modalities    Plan to see patient 2-3 times per week for 8 weeks to address the treatment planned outlined above.   [x]  Continue with current plan of care  []  Modify plan of care as follows:    []  Hold pending physician visit  []  Discharge    Time In 1131   Time Out 1215   Timed Code Minutes: 44 min   Total Treatment Time: 44 min     Electronically Signed by: Trini Kerr PT

## 2022-03-28 ENCOUNTER — HOSPITAL ENCOUNTER (OUTPATIENT)
Dept: PHYSICAL THERAPY | Age: 62
Setting detail: THERAPIES SERIES
Discharge: HOME | End: 2022-03-28
Payer: COMMERCIAL

## 2022-03-28 PROCEDURE — 97110 THERAPEUTIC EXERCISES: CPT

## 2022-03-28 NOTE — PROGRESS NOTES
[address] [facility]  PHYSICAL THERAPY  [x] DAILY NOTE (LAND) [] DAILY NOTE (AQUATIC ) [] PROGRESS NOTE [] DISCHARGE NOTE    [x] OUTPATIENT REHABILITATION CENTER [facility]   [] [place] [address]    [] [facility]   [] Gagandeep Jcg    Date: 3/28/2022  Patient Name:  Min Leach  : 1960  MRN: [id number]  CSN: [id number]    Referring Practitioner Jaci Rinaldi DPM   Diagnosis Achilles tendinitis, left leg [M76.62]  Calcaneal spur, left foot [M77.32]  Osteophyte, left foot [M25.775]  Plantar fascial fibromatosis [M72.2]    Treatment Diagnosis Pain in L ankle, decreased L ankle ROM, decreased L ankle strength, impaired balance, abnormal gait    Date of Evaluation 22    Additional Pertinent History COPD, obesity, arthritis, memory issues, SOB       Functional Outcome Measure Used [facility]    Functional Outcome Score  (22)       Insurance: Primary: Payor: Ngozi Ocasio /  /  / ,   Secondary:    Authorization Information: PRE CERTIFICATION REQUIRED: NO   INSURANCE THERAPY BENEFIT:  30 VISITS OF PT PER CALENDAR YEAR, SOFT LIMIT   AQUATIC THERAPY COVERED: YES  MODALITIES COVERED:  YES, NO MASSAGE    Visit # 9, 9/10 for progress note   Visits Allowed: 30 visits per year    Recertification Date: 3/84/87   Physician Follow-Up:    Physician Orders:    History of Present Illness: Patient reports that she had a surgery on L foot on 22. Patient reports that they removed a bone spur and reattached the achilles tendon with spider plate and screw. Patient reports that she was in a walking boot after surgery. Patient was using a walker at first to walk. Patient reports that she was in the walking boot for about 6 weeks. Patient is now WBAT in a tennis shoe.       SUBJECTIVE: Patient reports 3/10 pain in L ankle upon arrival. Patient reports that she has had a busy weekend and she was running a lot of errands as her sister in law passed away and she is helping with  arrangements. Objective:  TREATMENT   Precautions:    Pain: 3/10 Left ankle     X in shaded column indicates activity completed today   Modalities Parameters/  Location  Notes                     Manual Therapy Time/Technique  Notes   PROM: ankle pf/df, iv/er and toe flex/ex 10                 Exercise/Intervention   Notes   NuStep  5 min Level 4 X    Ankle AROM DF/PF, inv/evr, Circles. 1# ankle weight on foot 20x  X    Ankle ABCs 1# ankle weight on foot 1x  X    gastroc stretch  3x20      Seated HR/TR 20      Arch Lifts, big to lift and then lifting other toes. Toe flex/ext 15      Standing wooden BAPS ( 4 directions) 20x  X    Standing Rocker Board (2 directions) 20x 30 sec bal X    Seated calf stretch with strap  3x20  X    4 way ankle with peach band  10      Lunge stretch for calf and plantar fascia stretch at end of // bars 3x 20 sec  X    Weight shifts f/b and s/s 15x  X    Heel/Toe Raises, HS curls, marching, mini squats, 3-way hip 15x  X    4 inch step up. Forward/lateral  15x  X    Rocking forward/back working on heel/toe  20x  X    Foam: narrow stance, step stance  1 min each  X    Gait: gait forward heel/toe motion and retro,  Side step 2 laps in // bars  X    Heel raises off edge of step  10 x   Mild pain with this             Specific Interventions Next Treatment: L ankle ROM, 4 way ankle, L ankle/foot strengthening, gastroc/soleus stretch, gait training, step training, SLS-balance exercises, modalities as needed. Activity/Treatment Tolerance:  [x]  Patient tolerated treatment well  []  Patient limited by fatigue  []  Patient limited by pain   []  Patient limited by medical complications  []  Other:     Assessment: Patient did have some pain today upon arrival and therefore small progressions made with increased repetitions. Patient reports improved pain to 1.5-2/10 pain after the session. Patient does require instruction for exercise technique.  Patient educated on performing stretches several times today and use of ice to decrease soreness from the weekend. Patient will be re-assessed next session. GOALS:  Patient Goal: to improve pain and mobility of L ankle, return to work     Short Term Goals: 4 weeks  1. Patient will demonstrate increased L ankle DF AROM from 12 lacking degrees to 0 degrees to allow normalized gait pattern and decrease pain with walking. 2. Patient will demonstrate increased L ankle PF AROM from 35 degrees to 50 degrees to allow normalized gait pattern and decrease pain with walking. 3. Patient will demonstrate a decrease in L ankle pain to 3/10 at most to allow patient to return to work. 4. Patient will demonstrate an increase in L ankle strength to 5/5 to improve functional abilities. 5. Patient will be able to perform L SLS for 8 seconds to improve balance needed to negotiate uneven terrain in the community. 6. Patient will be able to ambulate with no AD and without deviation to allow improved ability to perform work tasks. Long Term Goals: 8 weeks  1. Patient will be independent with HEP in order to prevent re-injury and improve functional abilities. 2. Patient will improve FAAM score from 38/84 to 55/84 to allow improved functional mobility and ease of recreational activities. Patient Education:   [x]  HEP/Education Completed: sow controlled movement to allow improved quality of exercise, ice, stretch    [employer] Access Code:  [email or web address]  []  No new Education completed  []  Reviewed Prior HEP      [x]  Patient verbalized and/or demonstrated understanding of education provided. []  Patient unable to verbalize and/or demonstrate understanding of education provided. Will continue education.   []  Barriers to learning:     PLAN:  Treatment Recommendations: Strengthening, Range of Motion, Balance Training, Gait Training, Stair Training, Neuromuscular Re-education, Manual Therapy - Soft Tissue Mobilization, Manual Therapy - Joint Manipulation, Pain Management, Home Exercise Program, Patient Education, Aquatics and Modalities    Plan to see patient 2-3 times per week for 8 weeks to address the treatment planned outlined above.   [x]  Continue with current plan of care  []  Modify plan of care as follows:    []  Hold pending physician visit  []  Discharge    Time In 1132   Time Out 1212   Timed Code Minutes: 40 min   Total Treatment Time: 40 min     Electronically Signed by: Lauren Hope PT

## 2022-03-30 ENCOUNTER — HOSPITAL ENCOUNTER (OUTPATIENT)
Dept: PHYSICAL THERAPY | Age: 62
Setting detail: THERAPIES SERIES
Discharge: HOME | End: 2022-03-30
Payer: COMMERCIAL

## 2022-03-30 PROCEDURE — 97110 THERAPEUTIC EXERCISES: CPT

## 2022-03-30 NOTE — DISCHARGE SUMMARY
[facility]  PHYSICAL THERAPY  [] DAILY NOTE (LAND) [] DAILY NOTE (AQUATIC ) [] PROGRESS NOTE [x] DISCHARGE NOTE    [x] OUTPATIENT REHABILITATION CENTER [facility]   [] [place] [address]    [] [facility]   [] Gabriel Chun    Date: 3/30/2022  Patient Name:  Marge Caceres  : 1960  MRN: [id number]  CSN: [id number]    Referring Practitioner Jayna Robles DPM   Diagnosis Achilles tendinitis, left leg [M76.62]  Calcaneal spur, left foot [M77.32]  Osteophyte, left foot [M25.775]  Plantar fascial fibromatosis [M72.2]    Treatment Diagnosis Pain in L ankle, decreased L ankle ROM, decreased L ankle strength, impaired balance, abnormal gait    Date of Evaluation 22    Additional Pertinent History COPD, obesity, arthritis, memory issues, SOB       Functional Outcome Measure Used FAA    Functional Outcome Score 38/84 (22) 57/84 (3/30/22)      Insurance: Primary: Payor: Nicolle Ball /  /  / ,   Secondary:    Authorization Information: PRE CERTIFICATION REQUIRED: NO   INSURANCE THERAPY BENEFIT:  27 VISITS OF PT PER CALENDAR YEAR, SOFT LIMIT   AQUATIC THERAPY COVERED: YES  MODALITIES COVERED:  YES, NO MASSAGE    Visit # 8, 0/10 for progress note   Visits Allowed: 30 visits per year    Recertification Date:    Physician Follow-Up:    Physician Orders:    History of Present Illness: Patient reports that she had a surgery on L foot on 22. Patient reports that they removed a bone spur and reattached the achilles tendon with spider plate and screw. Patient reports that she was in a walking boot after surgery. Patient was using a walker at first to walk. Patient reports that she was in the walking boot for about 6 weeks. Patient is now WBAT in a tennis shoe. SUBJECTIVE: Patient reports 2/10 pain in L ankle upon arrival. Patient reports that she has had 3/10 pain at most in the past week. Patient reports 90% functional at this time.       Objective:  TREATMENT Precautions:    Pain: 2/10 Left ankle     X in shaded column indicates activity completed today   Modalities Parameters/  Location  Notes                     Manual Therapy Time/Technique  Notes   PROM: ankle pf/df, iv/er and toe flex/ex 10                 Exercise/Intervention   Notes   NuStep  6 min Level 6 X    Ankle AROM DF/PF, inv/evr, Circles. 1# ankle weight on foot 20x      Ankle ABCs 1# ankle weight on foot 1x      gastroc stretch  3x20      Seated HR/TR 20      Arch Lifts, big to lift and then lifting other toes. Toe flex/ext 15      Standing wooden BAPS ( 4 directions) 20x      Standing Rocker Board (2 directions) 20x 30 sec bal     Seated calf stretch with strap  3x20      4 way ankle with orange band  10  x    Lunge stretch for calf and plantar fascia stretch at end of // bars 3x 20 sec      Weight shifts f/b and s/s 15x      Heel/Toe Raises, HS curls, marching, mini squats, 3-way hip 15x      4 inch step up. Forward/lateral  15x      Rocking forward/back working on heel/toe  20x      Foam: narrow stance, step stance  1 min each      Gait: gait forward heel/toe motion and retro,  Side step 2 laps in // bars      Heel raises off edge of step  10 x   Mild pain with this    Review of HEP   x    Re-assessment measurements    x      Specific Interventions Next Treatment: L ankle ROM, 4 way ankle, L ankle/foot strengthening, gastroc/soleus stretch, gait training, step training, SLS-balance exercises, modalities as needed. Activity/Treatment Tolerance:  [x]  Patient tolerated treatment well  []  Patient limited by fatigue  []  Patient limited by pain   []  Patient limited by medical complications  []  Other:     Assessment: Patient seen for progress report today. Patient has been seen for 10 visits at this time. Patient has made progress toward goals. Patient improved FAAM from 38/84 to 57/84. Patient improved pain level to 3/10 pain at most in the past week.  Patient improved L ankle DF AROM from 12 lacking to 2 degrees. Patient improved L ankle PF AROM from 35 to 52 degrees. Patient improved L ankle strength to 5/5 MMT in all directions. Patient improved L SLS from 1 to 3 seconds. Patient has improved gait with patient able to ambulate with heel to toe pattern with improved ankle deviation. Patient does still have a mild antalgic gait pattern and this is likely related to her knee OA/pain. Patient would benefit from continuing to perform HEP to maintain gains from therapy and further improve functional mobility. Patient will be discharged to [facility] at this time as patient has met 6/8 goals and partially met 2/8 goals with patient in agreement. GOALS:  Patient Goal: to improve pain and mobility of L ankle, return to work     Short Term Goals: 4 weeks  1. Patient will demonstrate increased L ankle DF AROM from 12 lacking degrees to 0 degrees to allow normalized gait pattern and decrease pain with walking. GOAL MET:  L ankle DF 2 deg AROM. Discontinue Goal  2. Patient will demonstrate increased L ankle PF AROM from 35 degrees to 50 degrees to allow normalized gait pattern and decrease pain with walking. GOAL MET:  L ankle PF 52 deg. Discontinue Goal  3. Patient will demonstrate a decrease in L ankle pain to 3/10 at most to allow patient to return to work. GOAL MET:  3/10 pain L foot/ankle at most in the past week; 2/10 pain current. Discontinue Goal  4. Patient will demonstrate an increase in L ankle strength to 5/5 to improve functional abilities. GOAL MET:  L ankle strength 5/5 MMT . Discontinue Goal  5. Patient will be able to perform L SLS for 8 seconds to improve balance needed to negotiate uneven terrain in the community. GOAL NOT MET: L SLS 3 seconds . Discontinue Goal  6. Patient will be able to ambulate with no AD and without deviation to allow improved ability to perform work tasks.    GOAL NOT MET: Patient is walking with improved gait with mild antalgic gait at this time-likely more related to her knee pain/OA. Discontinue Goal    Long Term Goals: 8 weeks  1. Patient will be independent with HEP in order to prevent re-injury and improve functional abilities. GOAL MET:  Patient is I with HEP . Discontinue Goal  2. Patient will improve FAAM score from 38/84 to 55/84 to allow improved functional mobility and ease of recreational activities. GOAL MET:  FAAM 57/84. Discontinue Goal    Patient Education:   [x]  HEP/Education Completed: 4 way ankle review, review of HEP, provided theraband     [employer] Access Code:  [email or web address], [email or web address]  []  No new Education completed  [x]  Reviewed Prior HEP      [x]  Patient verbalized and/or demonstrated understanding of education provided. []  Patient unable to verbalize and/or demonstrate understanding of education provided. Will continue education. []  Barriers to learning:     PLAN:  Treatment Recommendations: Strengthening, Range of Motion, Balance Training, Gait Training, Stair Training, Neuromuscular Re-education, Manual Therapy - Soft Tissue Mobilization, Manual Therapy - Joint Manipulation, Pain Management, Home Exercise Program, Patient Education, Aquatics and Modalities    Plan to see patient 2-3 times per week for 8 weeks to address the treatment planned outlined above.   []  Continue with current plan of care  []  Modify plan of care as follows:    []  Hold pending physician visit  [x]  Discharge    Time In 1132   Time Out 1202   Timed Code Minutes: 30 min   Total Treatment Time: 30 min     Electronically Signed by: Kevin Self PT

## 2022-06-01 LAB
CHOLESTEROL, TOTAL: 192 MG/DL (ref 0–199)
FASTING: YES
GLUCOSE BLD-MCNC: 131 MG/DL (ref 74–109)
HBA1C MFR BLD: 6.2 % (ref 4.4–6.4)
HDLC SERPL-MCNC: 56 MG/DL (ref 40–90)
LDL CHOLESTEROL CALCULATED: 116 MG/DL
TRIGL SERPL-MCNC: 101 MG/DL (ref 0–199)

## 2022-08-03 ENCOUNTER — OFFICE VISIT (OUTPATIENT)
Dept: FAMILY MEDICINE CLINIC | Age: 62
End: 2022-08-03
Payer: COMMERCIAL

## 2022-08-03 VITALS
HEART RATE: 76 BPM | SYSTOLIC BLOOD PRESSURE: 134 MMHG | WEIGHT: 258 LBS | DIASTOLIC BLOOD PRESSURE: 76 MMHG | BODY MASS INDEX: 44.29 KG/M2 | OXYGEN SATURATION: 99 % | RESPIRATION RATE: 16 BRPM

## 2022-08-03 DIAGNOSIS — R23.1 CLAMMINESS: ICD-10-CM

## 2022-08-03 DIAGNOSIS — R06.02 SOB (SHORTNESS OF BREATH) ON EXERTION: Primary | ICD-10-CM

## 2022-08-03 DIAGNOSIS — R73.01 IFG (IMPAIRED FASTING GLUCOSE): ICD-10-CM

## 2022-08-03 DIAGNOSIS — E66.01 MORBID OBESITY WITH BMI OF 40.0-44.9, ADULT (HCC): ICD-10-CM

## 2022-08-03 DIAGNOSIS — R07.89 ATYPICAL CHEST PAIN: ICD-10-CM

## 2022-08-03 DIAGNOSIS — R42 LIGHTHEADEDNESS: ICD-10-CM

## 2022-08-03 DIAGNOSIS — Z77.22 HISTORY OF SECOND HAND SMOKE EXPOSURE: ICD-10-CM

## 2022-08-03 PROCEDURE — 99214 OFFICE O/P EST MOD 30 MIN: CPT | Performed by: NURSE PRACTITIONER

## 2022-08-03 RX ORDER — CHOLECALCIFEROL (VITAMIN D3) 1250 MCG
1 CAPSULE ORAL WEEKLY
COMMUNITY
End: 2022-09-16

## 2022-08-03 RX ORDER — BUDESONIDE AND FORMOTEROL FUMARATE DIHYDRATE 80; 4.5 UG/1; UG/1
2 AEROSOL RESPIRATORY (INHALATION) 2 TIMES DAILY
Qty: 10.2 G | Refills: 1 | Status: SHIPPED | OUTPATIENT
Start: 2022-08-03

## 2022-08-03 NOTE — PROGRESS NOTES
Subjective:      Patient ID: Kellie Schilling 1960 is a 60 y.o. female here for evaluation. Chief Complaint   Patient presents with    Follow-up    Results    Shortness of Breath    Hypertension       First thing in morning upon rising she will be lightheadeded - has to sit on side of bed. SOB just walking to car to work. Longstanding issues 2-3 years. SOB walking up stairs. Coughing during those moments. No smoking hx. Long standing 2nd hand smoking hx.  no personal smoking hx. Worse in Summer heat. more noticeable this Summer with the additional being lightheaded and weakness with walking. Denies chest tightness, CP, jaw pain or arm pain. Symptoms pre date issues with GERD. Patient Active Problem List   Diagnosis    GERD (gastroesophageal reflux disease)    Adjustment disorder with depressed mood    MISHA (obstructive sleep apnea)       Vitals:    08/03/22 1048   BP: 134/76   Pulse: 76   Resp: 16   SpO2: 99%        BP Readings from Last 3 Encounters:   08/03/22 134/76   01/12/22 (!) 147/82   01/12/22 (!) 103/56       Labs reviewed from [place]. Slight increase with A1C.      Lab Results   Component Value Date    LABA1C 6.2 06/01/2022    LABA1C 6.0 11/19/2021    LABA1C 5.9 01/16/2021     No results found for: EAG    No components found for: CHLPL  Lab Results   Component Value Date    TRIG 101 06/01/2022    TRIG 92 06/16/2021    TRIG 67 01/16/2021     Lab Results   Component Value Date    HDL 56 06/01/2022    HDL 54 06/16/2021    HDL 57 01/16/2021     Lab Results   Component Value Date    LDLCALC 116 06/01/2022    LDLCALC 109 06/16/2021    LDLCALC 105 01/16/2021     No results found for: LABVLDL      Chemistry        Component Value Date/Time     01/05/2022 1417    K 4.0 01/05/2022 1417     01/05/2022 1417    CO2 21 (L) 01/05/2022 1417    BUN 12 01/05/2022 1417    CREATININE 0.6 05/23/2022 0957        Component Value Date/Time    CALCIUM 9.5 01/05/2022 1417    ALKPHOS 96 01/05/2022 1417    AST 26 01/05/2022 1417    ALT 28 05/23/2022 0957    BILITOT 1.1 01/05/2022 1417            Lab Results   Component Value Date    TSH 1.810 01/16/2021       Lab Results   Component Value Date    WBC 6.6 01/05/2022    HGB 12.9 05/23/2022    HCT 40.7 05/23/2022    MCV 85.7 01/05/2022     01/05/2022         Health Maintenance   Topic Date Due    HIV screen  Never done    Cervical cancer screen  Never done    Shingles vaccine (2 of 3) 09/17/2013    COVID-19 Vaccine (4 - Booster for Moderna series) 03/19/2022    Flu vaccine (1) 09/01/2022    Breast cancer screen  01/08/2023    Depression Screen  01/10/2023    A1C test (Diabetic or Prediabetic)  06/01/2023    Colorectal Cancer Screen  02/02/2026    Lipids  06/01/2027    DTaP/Tdap/Td vaccine (2 - Td or Tdap) 11/24/2029    Hepatitis C screen  Completed    Hepatitis A vaccine  Aged Out    Hepatitis B vaccine  Aged Out    Hib vaccine  Aged Out    Meningococcal (ACWY) vaccine  Aged Out    Pneumococcal 0-64 years Vaccine  Aged [employer] History   Administered Date(s) Administered    COVID-19, MODERNA BLUE border, Primary or Immunocompromised, (age 12y+), IM, 100 mcg/0.5mL 01/22/2021, 02/19/2021, 11/19/2021    COVID-19, MODERNA Booster BLUE border, (age 18y+), IM, 50mcg/0.25mL 11/19/2021    Influenza Virus Vaccine 10/05/2018, 11/15/2019, 10/12/2020, 10/07/2021    Tdap (Boostrix, Adacel) 11/24/2019    Zoster Live (Zostavax) 07/23/2013       Review of Systems   Constitutional:  Positive for fatigue. Negative for chills and fever. HENT: Negative. Respiratory:  Positive for shortness of breath. Negative for cough. Cardiovascular:  Positive for chest pain. Gastrointestinal:  Negative for abdominal pain and nausea. Skin:  Negative for rash. Neurological:  Positive for weakness and light-headedness. Negative for dizziness and headaches. Psychiatric/Behavioral: Negative.        Objective:   Physical Exam  HENT:      Head: Normocephalic. Right Ear: Tympanic membrane normal.      Left Ear: Tympanic membrane normal.      Nose: Nose normal.   Eyes:      Pupils: Pupils are equal, round, and reactive to light. Neck:      Vascular: No carotid bruit. Cardiovascular:      Rate and Rhythm: Normal rate and regular rhythm. Pulses: Normal pulses. Heart sounds: Normal heart sounds. Pulmonary:      Effort: Pulmonary effort is normal.      Breath sounds: Normal breath sounds. Abdominal:      General: Bowel sounds are normal.      Palpations: Abdomen is soft. Musculoskeletal:         General: Normal range of motion. Cervical back: Normal range of motion and neck supple. Skin:     General: Skin is warm and dry. Neurological:      Mental Status: She is alert and oriented to person, place, and time. Assessment:       Diagnosis Orders   1. SOB (shortness of breath) on exertion  budesonide-formoterol (SYMBICORT) 80-4.5 MCG/ACT AERO    Full PFT Study With Bronchodilator    Stress test, myoview    Echocardiogram complete      2. Lightheadedness  Stress test, myoview    Echocardiogram complete      3. History of second hand smoke exposure  budesonide-formoterol (SYMBICORT) 80-4.5 MCG/ACT AERO    Full PFT Study With Bronchodilator    Stress test, myoview    Echocardiogram complete      4. Morbid obesity with BMI of 40.0-44.9, adult (HCC)  Stress test, myoview    Echocardiogram complete      5. IFG (impaired fasting glucose)  Stress test, myoview    Echocardiogram complete      6. Clamminess  Stress test, myoview    Echocardiogram complete      7.  Atypical chest pain  Stress test, myoview    Echocardiogram complete              Plan:      COPD due to 2nd hand hx vs CARDIO  PFT   Trial Symbicort  Rule out CARDIO with STRESS and ECHO - multiple risk factors  RTO in 1 month to review      Current Outpatient Medications   Medication Sig Dispense Refill    DICLOFENAC PO Take by mouth      Cholecalciferol (VITAMIN D3) 1.25 MG (40487 UT) CAPS Take 1 capsule by mouth in the morning. budesonide-formoterol (SYMBICORT) 80-4.5 MCG/ACT AERO Inhale 2 puffs into the lungs in the morning and 2 puffs in the evening. Rinse mouth after use. . 10.2 g 1    Multiple Vitamins-Minerals (MULTIVITAMIN ADULTS PO) Take by mouth daily      albuterol sulfate HFA (VENTOLIN HFA) 108 (90 Base) MCG/ACT inhaler Inhale 2 puffs into the lungs 4 times daily as needed for Wheezing or Shortness of Breath 1 each 5     No current facility-administered medications for this visit.

## 2022-08-04 ASSESSMENT — ENCOUNTER SYMPTOMS
NAUSEA: 0
SHORTNESS OF BREATH: 1
COUGH: 0
ABDOMINAL PAIN: 0

## 2022-08-12 ENCOUNTER — HOSPITAL ENCOUNTER (OUTPATIENT)
Dept: NON INVASIVE DIAGNOSTICS | Age: 62
Discharge: HOME | End: 2022-08-12
Payer: COMMERCIAL

## 2022-08-12 DIAGNOSIS — R73.01 IFG (IMPAIRED FASTING GLUCOSE): ICD-10-CM

## 2022-08-12 DIAGNOSIS — R07.89 ATYPICAL CHEST PAIN: ICD-10-CM

## 2022-08-12 DIAGNOSIS — E66.01 MORBID OBESITY WITH BMI OF 40.0-44.9, ADULT (HCC): ICD-10-CM

## 2022-08-12 DIAGNOSIS — Z77.22 HISTORY OF SECOND HAND SMOKE EXPOSURE: ICD-10-CM

## 2022-08-12 DIAGNOSIS — R42 LIGHTHEADEDNESS: ICD-10-CM

## 2022-08-12 DIAGNOSIS — R23.1 CLAMMINESS: ICD-10-CM

## 2022-08-12 DIAGNOSIS — R06.02 SOB (SHORTNESS OF BREATH) ON EXERTION: ICD-10-CM

## 2022-08-12 LAB
LV EF: 55 %
LVEF MODALITY: NORMAL

## 2022-08-12 PROCEDURE — 78452 HT MUSCLE IMAGE SPECT MULT: CPT

## 2022-08-12 PROCEDURE — A9500 TC99M SESTAMIBI: HCPCS | Performed by: INTERNAL MEDICINE

## 2022-08-12 PROCEDURE — 93017 CV STRESS TEST TRACING ONLY: CPT | Performed by: INTERNAL MEDICINE

## 2022-08-12 PROCEDURE — 3430000000 HC RX DIAGNOSTIC RADIOPHARMACEUTICAL: Performed by: INTERNAL MEDICINE

## 2022-08-12 PROCEDURE — 6360000002 HC RX W HCPCS

## 2022-08-12 PROCEDURE — 93306 TTE W/DOPPLER COMPLETE: CPT

## 2022-08-12 RX ADMIN — Medication 9.1 MILLICURIE: at 10:10

## 2022-08-12 RX ADMIN — Medication 32.9 MILLICURIE: at 11:36

## 2022-08-15 ENCOUNTER — TELEPHONE (OUTPATIENT)
Dept: CARDIOLOGY CLINIC | Age: 62
End: 2022-08-15

## 2022-08-15 NOTE — TELEPHONE ENCOUNTER
Pt has stress test and echo done 8-12-22 that was ordered by PCP. Stress test is abnormal.    Would you like pt to follow with cardiology?

## 2022-08-16 ENCOUNTER — TELEPHONE (OUTPATIENT)
Dept: CARDIOLOGY CLINIC | Age: 62
End: 2022-08-16

## 2022-08-16 DIAGNOSIS — R06.02 SOB (SHORTNESS OF BREATH) ON EXERTION: ICD-10-CM

## 2022-08-16 DIAGNOSIS — R94.39 ABNORMAL STRESS TEST: Primary | ICD-10-CM

## 2022-08-16 DIAGNOSIS — R07.89 ATYPICAL CHEST PAIN: ICD-10-CM

## 2022-08-16 NOTE — TELEPHONE ENCOUNTER
Referral received from Dr Amelia Santos. LM for pt to return call.      Diagnosis   R06.02 (ICD-10-CM) - SOB (shortness of breath) on exertion   R07.89 (ICD-10-CM) - Atypical chest pain   R94.39 (ICD-10-CM) - Abnormal stress test

## 2022-08-16 NOTE — TELEPHONE ENCOUNTER
Patient preference is Dr. Schaffer Pac, if he's not available okay for patient to see another provider.

## 2022-08-16 NOTE — TELEPHONE ENCOUNTER
Felipa Mark CNP will be placing a referral to [facility] cardiology today. He would like patient to be seen asap. Daughter Ana Paula on HIPAA will be taking the call in regards to appointment.

## 2022-08-18 ENCOUNTER — OFFICE VISIT (OUTPATIENT)
Dept: CARDIOLOGY CLINIC | Age: 62
End: 2022-08-18
Payer: COMMERCIAL

## 2022-08-18 VITALS
DIASTOLIC BLOOD PRESSURE: 79 MMHG | BODY MASS INDEX: 45.64 KG/M2 | HEART RATE: 86 BPM | HEIGHT: 63 IN | WEIGHT: 257.6 LBS | SYSTOLIC BLOOD PRESSURE: 130 MMHG

## 2022-08-18 DIAGNOSIS — R07.89 CHEST PAIN, ATYPICAL: ICD-10-CM

## 2022-08-18 DIAGNOSIS — R94.39 ABNORMAL NUCLEAR STRESS TEST: ICD-10-CM

## 2022-08-18 DIAGNOSIS — R06.02 SOB (SHORTNESS OF BREATH) ON EXERTION: Primary | ICD-10-CM

## 2022-08-18 DIAGNOSIS — R94.31 ABNORMAL EKG: ICD-10-CM

## 2022-08-18 DIAGNOSIS — E66.01 MORBID OBESITY DUE TO EXCESS CALORIES (HCC): ICD-10-CM

## 2022-08-18 PROCEDURE — 99204 OFFICE O/P NEW MOD 45 MIN: CPT | Performed by: INTERNAL MEDICINE

## 2022-08-18 RX ORDER — ASPIRIN 81 MG/1
81 TABLET ORAL DAILY
Qty: 90 TABLET | Refills: 1 | COMMUNITY
Start: 2022-08-18

## 2022-08-18 NOTE — PROGRESS NOTES
Referred for sob on exertion and abn stress tets  Abnormal stress test f/u. Sob on exertion progressively worse over 2 yrs- worse in the last couple of months    EKG done 1-5-2022. Hx of two episodes of chest pain - discofort last few seconds on around 1 to 2 month back    Occasional dizziness on standing- frequent     Denied palpitation    Trace leg edema since foot surgery    Had Foot left several months back       Nonsmoker    Fhx    fATHER   HAD cva/tia, cabg IN HIS 70'S        Past Surgical History:   Procedure Laterality Date    ACHILLES TENDON SURGERY Left 1/12/2022    EXCISION OF POSTERIOR CALCANEAL HEEL SPUR LEFT HEEL, REATTACHMENT OF ACHILLES TENDON ON LEFT WITH SPIDER PLATE AND SCREW performed by Lion Barriga DPM at [facility]      rt foot    TUBAL LIGATION         Allergies   Allergen Reactions    Sulfa Antibiotics Swelling     Feet swelled        Family History   Problem Relation Age of Onset    Arthritis Mother     Stroke Mother     Diabetes Father     Stroke Father     High Blood Pressure Father         Social History     Socioeconomic History    Marital status:       Spouse name: Not on file    Number of children: 4    Years of education: 12    Highest education level: High school graduate   Occupational History    Not on file   Tobacco Use    Smoking status: Never    Smokeless tobacco: Never   Vaping Use    Vaping Use: Never used   Substance and Sexual Activity    Alcohol use: Not Currently     Comment: occasionally    Drug use: No    Sexual activity: Not on file   Other Topics Concern    Not on file   Social History Narrative    Not on file     Social Determinants of Health     Financial Resource Strain: Low Risk     Difficulty of Paying Living Expenses: Not hard at all   Food Insecurity: No Food Insecurity    Worried About [address] in the Last Year: Never true    [address] in the Last Year: Never true   Transportation Needs: Not on file   Physical Activity: Not on file   Stress: Not on file   Social Connections: Not on file   Intimate Partner Violence: Not on file   Housing Stability: Not on file       Current Outpatient Medications   Medication Sig Dispense Refill    aspirin EC 81 MG EC tablet Take 1 tablet by mouth daily 90 tablet 1    metoprolol tartrate (LOPRESSOR) 25 MG tablet Take 0.5 tablets by mouth 2 times daily 30 tablet 1    DICLOFENAC PO Take by mouth      Cholecalciferol (VITAMIN D3) 1.25 MG (50757 UT) CAPS Take 1 capsule by mouth in the morning. budesonide-formoterol (SYMBICORT) 80-4.5 MCG/ACT AERO Inhale 2 puffs into the lungs in the morning and 2 puffs in the evening. Rinse mouth after use. . 10.2 g 1    Multiple Vitamins-Minerals (MULTIVITAMIN ADULTS PO) Take by mouth daily      albuterol sulfate HFA (VENTOLIN HFA) 108 (90 Base) MCG/ACT inhaler Inhale 2 puffs into the lungs 4 times daily as needed for Wheezing or Shortness of Breath 1 each 5     No current facility-administered medications for this visit. Review of Systems -     General ROS: negative  Psychological ROS: negative  Hematological and Lymphatic ROS: No history of blood clots or bleeding disorder. Respiratory ROS: no cough,  or wheezing, the rest see HPI  Cardiovascular ROS: See HPI  Gastrointestinal ROS: negative  Genito-Urinary ROS: no dysuria, trouble voiding, or hematuria  Musculoskeletal ROS: negative  Neurological ROS: no TIA or stroke symptoms  Dermatological ROS: negative      Blood pressure 130/79, pulse 86, height 5' 3\" (1.6 m), weight 257 lb 9.6 oz (116.8 kg), not currently breastfeeding.         Physical Examination:    General appearance - alert, well appearing, and in no distress  HEENT- Pink conjunctiva  , Non-icteri sclera,PERRLA  Mental status - alert, oriented to person, place, and time  Neck - supple, no significant adenopathy, no JVD, or carotid bruits  Chest - clear to auscultation, no wheezes, not limited to  Bleeding including retroperitoneal bleed 1%, infection, MI, CVA, JONI, Limb loss, dissection, allergic reaction,death  Each of them 1 in 2000 range. The alternative managment has been explained. Patient expressed understanding of the risk and benefit and of the alternative managment well. Patient wanted and agreed to proceed with left heart cath.   Hence we will schedule hER for [facility]       RTC IN 3 WEEKS        [facility]

## 2022-08-25 ENCOUNTER — PREP FOR PROCEDURE (OUTPATIENT)
Dept: CARDIOLOGY | Age: 62
End: 2022-08-25

## 2022-08-25 RX ORDER — ASPIRIN 325 MG
325 TABLET ORAL ONCE
Status: CANCELLED | OUTPATIENT
Start: 2022-08-25 | End: 2022-08-25

## 2022-08-25 RX ORDER — SODIUM CHLORIDE 9 MG/ML
INJECTION, SOLUTION INTRAVENOUS PRN
Status: CANCELLED | OUTPATIENT
Start: 2022-08-25

## 2022-08-25 RX ORDER — NITROGLYCERIN 0.4 MG/1
0.4 TABLET SUBLINGUAL EVERY 5 MIN PRN
Status: CANCELLED | OUTPATIENT
Start: 2022-08-25

## 2022-08-25 RX ORDER — SODIUM CHLORIDE 9 MG/ML
INJECTION, SOLUTION INTRAVENOUS CONTINUOUS
Status: CANCELLED | OUTPATIENT
Start: 2022-08-25

## 2022-08-25 RX ORDER — SODIUM CHLORIDE 0.9 % (FLUSH) 0.9 %
5-40 SYRINGE (ML) INJECTION EVERY 12 HOURS SCHEDULED
Status: CANCELLED | OUTPATIENT
Start: 2022-08-25

## 2022-08-25 RX ORDER — SODIUM CHLORIDE 0.9 % (FLUSH) 0.9 %
5-40 SYRINGE (ML) INJECTION PRN
Status: CANCELLED | OUTPATIENT
Start: 2022-08-25

## 2022-08-26 ENCOUNTER — HOSPITAL ENCOUNTER (OUTPATIENT)
Dept: INPATIENT UNIT | Age: 62
Discharge: HOME | End: 2022-08-26
Attending: INTERNAL MEDICINE | Admitting: INTERNAL MEDICINE
Payer: COMMERCIAL

## 2022-08-26 VITALS
RESPIRATION RATE: 20 BRPM | WEIGHT: 257 LBS | TEMPERATURE: 98.1 F | DIASTOLIC BLOOD PRESSURE: 90 MMHG | HEIGHT: 63 IN | BODY MASS INDEX: 45.54 KG/M2 | OXYGEN SATURATION: 99 % | HEART RATE: 74 BPM | SYSTOLIC BLOOD PRESSURE: 139 MMHG

## 2022-08-26 PROBLEM — Z98.890 S/P CARDIAC CATH: Status: ACTIVE | Noted: 2022-08-26

## 2022-08-26 LAB
ABO: NORMAL
ANION GAP SERPL CALCULATED.3IONS-SCNC: 13 MEQ/L (ref 8–16)
ANTIBODY SCREEN: NORMAL
APTT: 29.8 SECONDS (ref 22–38)
BUN BLDV-MCNC: 26 MG/DL (ref 7–22)
CALCIUM SERPL-MCNC: 9.3 MG/DL (ref 8.5–10.5)
CHLORIDE BLD-SCNC: 108 MEQ/L (ref 98–111)
CHOLESTEROL, TOTAL: 192 MG/DL (ref 100–199)
CO2: 22 MEQ/L (ref 23–33)
CREAT SERPL-MCNC: 0.6 MG/DL (ref 0.4–1.2)
EKG ATRIAL RATE: 82 BPM
EKG P AXIS: -3 DEGREES
EKG P-R INTERVAL: 170 MS
EKG Q-T INTERVAL: 396 MS
EKG QRS DURATION: 76 MS
EKG QTC CALCULATION (BAZETT): 462 MS
EKG R AXIS: 44 DEGREES
EKG T AXIS: 22 DEGREES
EKG VENTRICULAR RATE: 82 BPM
ERYTHROCYTE [DISTWIDTH] IN BLOOD BY AUTOMATED COUNT: 13.8 % (ref 11.5–14.5)
ERYTHROCYTE [DISTWIDTH] IN BLOOD BY AUTOMATED COUNT: 42.5 FL (ref 35–45)
GFR SERPL CREATININE-BSD FRML MDRD: > 90 ML/MIN/1.73M2
GLUCOSE BLD-MCNC: 103 MG/DL (ref 70–108)
HCT VFR BLD CALC: 39.1 % (ref 37–47)
HDLC SERPL-MCNC: 56 MG/DL
HEMOGLOBIN: 12.6 GM/DL (ref 12–16)
INR BLD: 0.98 (ref 0.85–1.13)
LDL CHOLESTEROL CALCULATED: 121 MG/DL
LV EF: 53 %
LVEF MODALITY: NORMAL
MCH RBC QN AUTO: 27.5 PG (ref 26–33)
MCHC RBC AUTO-ENTMCNC: 32.2 GM/DL (ref 32.2–35.5)
MCV RBC AUTO: 85.2 FL (ref 81–99)
PLATELET # BLD: 269 THOU/MM3 (ref 130–400)
PMV BLD AUTO: 10.7 FL (ref 9.4–12.4)
POTASSIUM SERPL-SCNC: 4.3 MEQ/L (ref 3.5–5.2)
RBC # BLD: 4.59 MILL/MM3 (ref 4.2–5.4)
RH FACTOR: NORMAL
SODIUM BLD-SCNC: 143 MEQ/L (ref 135–145)
TRIGL SERPL-MCNC: 77 MG/DL (ref 0–199)
WBC # BLD: 6.5 THOU/MM3 (ref 4.8–10.8)

## 2022-08-26 PROCEDURE — 93458 L HRT ARTERY/VENTRICLE ANGIO: CPT

## 2022-08-26 PROCEDURE — 2580000003 HC RX 258: Performed by: PHYSICIAN ASSISTANT

## 2022-08-26 PROCEDURE — 85730 THROMBOPLASTIN TIME PARTIAL: CPT

## 2022-08-26 PROCEDURE — 93010 ELECTROCARDIOGRAM REPORT: CPT | Performed by: INTERNAL MEDICINE

## 2022-08-26 PROCEDURE — 86900 BLOOD TYPING SEROLOGIC ABO: CPT

## 2022-08-26 PROCEDURE — 93005 ELECTROCARDIOGRAM TRACING: CPT | Performed by: PHYSICIAN ASSISTANT

## 2022-08-26 PROCEDURE — 86850 RBC ANTIBODY SCREEN: CPT

## 2022-08-26 PROCEDURE — 6360000002 HC RX W HCPCS

## 2022-08-26 PROCEDURE — 80061 LIPID PANEL: CPT

## 2022-08-26 PROCEDURE — C1769 GUIDE WIRE: HCPCS

## 2022-08-26 PROCEDURE — C1894 INTRO/SHEATH, NON-LASER: HCPCS

## 2022-08-26 PROCEDURE — 36415 COLL VENOUS BLD VENIPUNCTURE: CPT

## 2022-08-26 PROCEDURE — 80048 BASIC METABOLIC PNL TOTAL CA: CPT

## 2022-08-26 PROCEDURE — 6360000004 HC RX CONTRAST MEDICATION: Performed by: INTERNAL MEDICINE

## 2022-08-26 PROCEDURE — 85610 PROTHROMBIN TIME: CPT

## 2022-08-26 PROCEDURE — 93458 L HRT ARTERY/VENTRICLE ANGIO: CPT | Performed by: INTERNAL MEDICINE

## 2022-08-26 PROCEDURE — 2500000003 HC RX 250 WO HCPCS

## 2022-08-26 PROCEDURE — 85027 COMPLETE CBC AUTOMATED: CPT

## 2022-08-26 PROCEDURE — 86901 BLOOD TYPING SEROLOGIC RH(D): CPT

## 2022-08-26 RX ORDER — SODIUM CHLORIDE 0.9 % (FLUSH) 0.9 %
5-40 SYRINGE (ML) INJECTION PRN
Status: DISCONTINUED | OUTPATIENT
Start: 2022-08-26 | End: 2022-08-26 | Stop reason: HOSPADM

## 2022-08-26 RX ORDER — NITROGLYCERIN 0.4 MG/1
0.4 TABLET SUBLINGUAL EVERY 5 MIN PRN
Status: DISCONTINUED | OUTPATIENT
Start: 2022-08-26 | End: 2022-08-26 | Stop reason: HOSPADM

## 2022-08-26 RX ORDER — SODIUM CHLORIDE 0.9 % (FLUSH) 0.9 %
5-40 SYRINGE (ML) INJECTION EVERY 12 HOURS SCHEDULED
Status: DISCONTINUED | OUTPATIENT
Start: 2022-08-26 | End: 2022-08-26 | Stop reason: HOSPADM

## 2022-08-26 RX ORDER — SODIUM CHLORIDE 9 MG/ML
INJECTION, SOLUTION INTRAVENOUS PRN
Status: DISCONTINUED | OUTPATIENT
Start: 2022-08-26 | End: 2022-08-26 | Stop reason: SDUPTHER

## 2022-08-26 RX ORDER — ACETAMINOPHEN 325 MG/1
650 TABLET ORAL EVERY 4 HOURS PRN
Status: DISCONTINUED | OUTPATIENT
Start: 2022-08-26 | End: 2022-08-26 | Stop reason: HOSPADM

## 2022-08-26 RX ORDER — SODIUM CHLORIDE 0.9 % (FLUSH) 0.9 %
5-40 SYRINGE (ML) INJECTION PRN
Status: DISCONTINUED | OUTPATIENT
Start: 2022-08-26 | End: 2022-08-26 | Stop reason: SDUPTHER

## 2022-08-26 RX ORDER — ONDANSETRON 2 MG/ML
4 INJECTION INTRAMUSCULAR; INTRAVENOUS EVERY 6 HOURS PRN
Status: DISCONTINUED | OUTPATIENT
Start: 2022-08-26 | End: 2022-08-26 | Stop reason: HOSPADM

## 2022-08-26 RX ORDER — SODIUM CHLORIDE 0.9 % (FLUSH) 0.9 %
5-40 SYRINGE (ML) INJECTION EVERY 12 HOURS SCHEDULED
Status: DISCONTINUED | OUTPATIENT
Start: 2022-08-26 | End: 2022-08-26 | Stop reason: SDUPTHER

## 2022-08-26 RX ORDER — SODIUM CHLORIDE 9 MG/ML
INJECTION, SOLUTION INTRAVENOUS CONTINUOUS
Status: DISCONTINUED | OUTPATIENT
Start: 2022-08-26 | End: 2022-08-26 | Stop reason: HOSPADM

## 2022-08-26 RX ORDER — ASPIRIN 325 MG
325 TABLET ORAL ONCE
Status: DISCONTINUED | OUTPATIENT
Start: 2022-08-26 | End: 2022-08-26 | Stop reason: HOSPADM

## 2022-08-26 RX ORDER — SODIUM CHLORIDE 9 MG/ML
INJECTION, SOLUTION INTRAVENOUS PRN
Status: DISCONTINUED | OUTPATIENT
Start: 2022-08-26 | End: 2022-08-26 | Stop reason: HOSPADM

## 2022-08-26 RX ADMIN — SODIUM CHLORIDE: 9 INJECTION, SOLUTION INTRAVENOUS at 14:00

## 2022-08-26 RX ADMIN — IOPAMIDOL 60 ML: 755 INJECTION, SOLUTION INTRAVENOUS at 16:46

## 2022-08-26 NOTE — DISCHARGE INSTRUCTIONS
when you will be able to return to work. Do not drive until your doctor says it is okay. Avoid heavy lifting, physically demanding activities, and sexual activity for 5-7 days. Your activity will also depend upon where the catheter was inserted:   If the catheter was inserted in an arm blood vesselKeep your arm straight and still with an arm board for two hours after the procedure is completed. If a vessel in your groin was usedLie flat on your back for at least a few hours to prevent bleeding. A compression device may also be placed on your groin to prevent bleeding. Do not sit for long periods of time. Try to change positions frequently. Medications    If advised by your doctor, resume taking your normal medicines. Use acetaminophen (Tylenol) for pain relief. Do not take metformin (Glucophage) or glyburide and metformin (Glucovance) for 48 hours after the test or until your doctor says it is okay. If you had to stop taking these medications before the procedure, ask your doctor when you can resume taking them:   Anti-inflammatory drugs (eg, ibuprofen )   Blood thinners, such as warfarin (Coumadin)   Clopidogrel (Plavix)   If you are taking medicines, follow these general guidelines:   Take your medicine as directed. Do not change the amount or the schedule. Do not stop taking them without talking to your doctor. Do not share them. Know what the results and side effects. Report them to your doctor. Some drugs can be dangerous when mixed. Talk to a doctor or pharmacist if you are taking more than one drug. This includes over-the-counter medicine and herb or dietary supplements. Plan ahead for refills so you don't run out. Lifestyle Changes    Based on the results of the test, your doctor may instruct you to make certain heart-healthy lifestyle changes. These may include dietary changes or increasing your exercise.    Follow-up   The test results are generally available right after the procedure. At that point, your doctor will discuss the findings and suggest appropriate treatment options. In some cases, the results can indicate an immediate need for surgery. Schedule a follow-up appointment as directed by your doctor. Call Your Doctor If Any of the Following Occurs   Monitor your recovery once you leave the hospital. If you have a problem, alert your doctor. If any of the following occur, call your doctor:   Signs of infection, including fever and chills   Redness, swelling, increasing pain, excessive bleeding, or any discharge from the catheter insertion site   Call 911 If Any of the Following Occurs   CALL 911  if you have symptoms including:   Drooping facial muscles   Changes in vision or speech   Difficulty walking or using your limbs   Change in sensation to affected leg/hand, including numbness, feeling cold, or change in color   Extreme sweating, nausea or vomiting   Dizziness or lightheadedness   Chest pain   Rapid, irregular heartbeat   Palpitations   Cough, shortness of breath, or difficulty breathing   Weakness or fainting   If you think you have an emergency,  CALL 911  . Home going sedation advice sheet given to patient. Heart-Healthy Diet: Care Instructions  Your Care Instructions     A heart-healthy diet has lots of vegetables, fruits, nuts, beans, and whole grains, and is low in salt. It limits foods that are high in saturated fat, such as meats, cheeses, and fried foods. It may be hard to change your diet,but even small changes can lower your risk of heart attack and heart disease. Follow-up care is a key part of your treatment and safety. Be sure to make and go to all appointments, and call your doctor if you are having problems. It's also a good idea to know your test results and keep alist of the medicines you take. How can you care for yourself at home?   Watch your portions  Use food labels to learn what the recommended servings are for the foods you eat.  Eat only the number of calories you need to stay at a healthy weight. If you need to lose weight, eat fewer calories than your body burns (through exercise and other physical activity). Eat more fruits and vegetables  Eat a variety of fruit and vegetables every day. Dark green, deep orange, red, or yellow fruits and vegetables are especially good for you. Examples include spinach, carrots, peaches, and berries. Keep carrots, celery, and other veggies handy for snacks. Buy fruit that is in season and store it where you can see it so that you will be tempted to eat it. Cook dishes that have a lot of veggies in them, such as stir-fries and soups. Limit saturated fat  Read food labels, and try to avoid saturated fats. They increase your risk of heart disease. Use olive or canola oil when you cook. Bake, broil, grill, or steam foods instead of frying them. Choose lean meats instead of high-fat meats such as hot dogs and sausages. Cut off all visible fat when you prepare meat. Eat fish, skinless poultry, and meat alternatives such as soy products instead of high-fat meats. Soy products, such as tofu, may be especially good for your heart. Choose low-fat or fat-free milk and dairy products. Eat foods high in fiber  Eat a variety of grain products every day. Include whole-grain foods that have lots of fiber and nutrients. Examples of whole-grain foods include oats, whole wheat bread, and brown rice. Buy whole-grain breads and cereals, instead of white bread or pastries. Limit salt and sodium  Limit how much salt and sodium you eat to help lower your blood pressure. Taste food before you salt it. Add only a little salt when you think you need it. With time, your taste buds will adjust to less salt. Eat fewer snack items, fast foods, and other high-salt, processed foods. Check food labels for the amount of sodium in packaged foods.   Choose low-sodium versions of canned goods (such as soups, vegetables, and beans). Limit sugar  Limit drinks and foods with added sugar. These include candy, desserts, and soda pop. Limit alcohol  Limit alcohol to no more than 2 drinks a day for men and 1 drink a day for women. Too much alcohol can cause health problems. When should you call for help? Watch closely for changes in your health, and be sure to contact your doctor if:    You would like help planning heart-healthy meals. Where can you learn more? Go to https://morphCARDsarahBitComet.HYGIEIA. org and sign in to your [employer] account. Enter V137 in the Schrodinger box to learn more about \"Heart-Healthy Diet: Care Instructions. \"     If you do not have an account, please click on the \"Sign Up Now\" link. Current as of: September 8, 2021               Content Version: 13.3  © 1662-3015 Healthwise, [employer]. Care instructions adapted under license by [facility] ([facility]). If you have questions about a medical condition or this instruction, always ask your healthcare professional. [place] [address] any warranty or liability for your use of this information. Eating Healthy Foods: Care Instructions  Your Care Instructions     Eating healthy foods can help lower your risk for disease. Healthy food gives you energy and keeps your heart strong, your brain active, your musclesworking, and your bones strong. A healthy diet includes a variety of foods from the basic food groups: grains, vegetables, fruits, milk and milk products, and meat and beans. Some people may eat more of their favorite foods from only one food group and, as a result, miss getting the nutrients they need. So, it is important to pay attention not only to what you eat but also to what you are missing from your diet. You caneat a healthy, balanced diet by making a few small changes. Follow-up care is a key part of your treatment and safety. Be sure to make and go to all appointments, and call your doctor if you are having problems. It's also a good idea to know your test results and keep alist of the medicines you take. How can you care for yourself at home? Look at what you eat  Keep a food diary for a week or two and record everything you eat or drink. Track the number of servings you eat from each food group. For a balanced diet every day, eat a variety of:  6 or more ounce-equivalents of grains, such as cereals, breads, crackers, rice, or pasta, every day. An ounce-equivalent is 1 slice of bread, 1 cup of ready-to-eat cereal, or ½ cup of cooked rice, cooked pasta, or cooked cereal.  2½ cups of vegetables, especially:  Dark-green vegetables such as broccoli and spinach. Orange vegetables such as carrots and sweet potatoes. Dry beans (such as christopher and kidney beans) and peas (such as lentils). 2 cups of fresh, frozen, or canned fruit. A small apple or 1 banana or orange equals 1 cup.  3 cups of nonfat or low-fat milk, yogurt, or other milk products. 5½ ounces of meat and beans, such as chicken, fish, lean meat, beans, nuts, and seeds. One egg, 1 tablespoon of peanut butter, ½ ounce nuts or seeds, or ¼ cup of cooked beans equals 1 ounce of meat. Learn how to read food labels for serving sizes and ingredients. Fast-food and convenience-food meals often contain few or no fruits or vegetables. Make sure you eat some fruits and vegetables to make the meal more nutritious. Look at your food diary. For each food group, add up what you have eaten and then divide the total by the number of days. This will give you an idea of how much you are eating from each food group. See if you can find some ways to change your diet to make it more healthy. Start small  Do not try to make dramatic changes to your diet all at once. You might feel that you are missing out on your favorite foods and then be more likely to fail. Start slowly, and gradually change your habits. Try some of the following:  Use whole wheat bread instead of white bread.   Use nonfat or low-fat milk instead of whole milk. Eat brown rice instead of white rice, and eat whole wheat pasta instead of white-flour pasta. Try low-fat cheeses and low-fat yogurt. Add more fruits and vegetables to meals and have them for snacks. Add lettuce, tomato, cucumber, and onion to sandwiches. Add fruit to yogurt and cereal.  Enjoy food  You can still eat your favorite foods. You just may need to eat less of them. If your favorite foods are high in fat, salt, and sugar, limit how often you eat them, but do not cut them out entirely. Eat a wide variety of foods. Make healthy choices when eating out  The type of restaurant you choose can help you make healthy choices. Even fast-food chains are now offering more low-fat or healthier choices on the menu. Choose smaller portions, or take half of your meal home. When eating out, try:  A veggie pizza with a whole wheat crust or grilled chicken (instead of sausage or pepperoni). Pasta with roasted vegetables, grilled chicken, or marinara sauce instead of cream sauce. A vegetable wrap or grilled chicken wrap. Broiled or poached food instead of fried or breaded items. Make healthy choices easy  Buy packaged, prewashed, ready-to-eat fresh vegetables and fruits, such as baby carrots, salad mixes, and chopped or shredded broccoli and cauliflower. Buy packaged, presliced fruits, such as melon or pineapple. Choose 100% fruit or vegetable juice instead of soda. Limit juice intake to 4 to 6 oz (½ to ¾ cup) a day. Blend low-fat yogurt, fruit juice, and canned or frozen fruit to make a smoothie for breakfast or a snack. Where can you learn more? Go to https://Yuppicsbarrett.Luminary Micro. org and sign in to your [employer] account. Enter I159 in the blabfeed box to learn more about \"Eating Healthy Foods: Care Instructions. \"     If you do not have an account, please click on the \"Sign Up Now\" link.   Current as of: September 8, 2021 Content Version: 13.3  © 7364-4275 Healthwise, [employer]. Care instructions adapted under license by [facility] ([facility]). If you have questions about a medical condition or this instruction, always ask your healthcare professional. [address] any warranty or liability for your use of this information.

## 2022-08-26 NOTE — FLOWSHEET NOTE
Patient admitted to 2E04  Ambulatory for heart catheterization. Patient NPO  Vital signs obtained. Assessment and data collection intiated. Oriented to room. Policies and procedures for 2E explained. All questions answered with no further questions at this time. Fall prevention and safety precautions discussed with patient. Explained patients right to have family, representative or physician notified of their admission. Patient has Declined for physician to be notified. Patient has Declined for family/representative to be notified.

## 2022-08-26 NOTE — H&P
[address] [address]  Sedation/Analgesia History & Physical    Pt Name: Ana Crane  MRN: [id number]  YOB: 1960  Provider Performing Procedure: Ethel Perez MD  Primary Care Physician: Timo Gastelum, DO    PRE-PROCEDURE   DNR-CCA/DNR-CC []Yes [x]No  Brief History/Pre-Procedure Diagnosis:   Sob on exertion angina equivalent CCS class III and abn nuc stress test  Need cardiac cath    The risk and benefit of left heart cath has been explain in detail including but not limited to  Bleeding including retroperitoneal bleed 1%, infection, MI, CVA, JONI, Limb loss, dissection, allergic reaction,death  Each of them 1 in 2000 range. The alternative managment has been explained. Patient expressed understanding of the risk and benefit and of the alternative managment well. Patient wanted and agreed to proceed with left heart cath. Hence we will schedule her for [facility]             MEDICAL HISTORY  []CAD/Valve  []Liver Disease  []Lung Disease []Diabetes  []Hypertension []Renal Disease  []Additional information:       has a past medical history of COPD (chronic obstructive pulmonary disease) ([facility] [address] GERD (gastroesophageal reflux disease), Obesity, Restless legs syndrome, and Unspecified sleep apnea. SURGICAL HISTORY   has a past surgical history that includes Tubal ligation (); Foot surgery; Endometrial ablation; and Achilles tendon surgery (Left, 1/12/2022).   Additional information:       ALLERGIES   Allergies as of 08/26/2022 - Fully Reviewed 08/26/2022   Allergen Reaction Noted    Sulfa antibiotics Swelling 02/15/2012     Additional information:       MEDICATIONS   Coumadin Use Last 5 Days [x]No []Yes  Antiplatelet drug therapy use last 5 days  []No [x]Yes  Other anticoagulant use last 5 days  [x]No []Yes    Current Facility-Administered Medications:     0.9 % sodium chloride infusion, , IntraVENous, Continuous, Yair Prasad PA-C, Last Rate: 75 mL/hr at 08/26/22 1400, New Bag at 08/26/22 1400    sodium chloride flush 0.9 % injection 5-40 mL, 5-40 mL, IntraVENous, 2 times per day, Kenzie Black PA-C    sodium chloride flush 0.9 % injection 5-40 mL, 5-40 mL, IntraVENous, PRN, Felisa Guevara PA-C    0.9 % sodium chloride infusion, , IntraVENous, PRN, Kenzie Black PA-C    aspirin tablet 325 mg, 325 mg, Oral, Once, Kenzie Black PA-C    nitroGLYCERIN (NITROSTAT) SL tablet 0.4 mg, 0.4 mg, SubLINGual, Q5 Min PRN, Kenzie Black PA-C  Prior to Admission medications    Medication Sig Start Date End Date Taking? Authorizing Provider   aspirin EC 81 MG EC tablet Take 1 tablet by mouth daily 8/18/22   Phil Crockett MD   metoprolol tartrate (LOPRESSOR) 25 MG tablet Take 0.5 tablets by mouth 2 times daily 8/18/22   Phil Crockett MD   DICLOFENAC PO Take by mouth    Historical Provider, MD   Cholecalciferol (VITAMIN D3) 1.25 MG (73958 UT) CAPS Take 1 capsule by mouth once a week    Historical Provider, MD   budesonide-formoterol (SYMBICORT) 80-4.5 MCG/ACT AERO Inhale 2 puffs into the lungs in the morning and 2 puffs in the evening. Rinse mouth after use. . 8/3/22   Mansi Ruiz APRN - CNP   Multiple Vitamins-Minerals (MULTIVITAMIN ADULTS PO) Take by mouth daily    Historical Provider, MD   albuterol sulfate HFA (VENTOLIN HFA) 108 (90 Base) MCG/ACT inhaler Inhale 2 puffs into the lungs 4 times daily as needed for Wheezing or Shortness of Breath 1/3/22   Marcelo Gonzales DO     Additional information:       VITAL SIGNS   Vitals:    08/26/22 1325   BP: (!) 153/84   Pulse: 85   Resp: 20   Temp: 98 °F (36.7 °C)   SpO2: 97%       PHYSICAL:   Heart:  [x]Regular rate and rhythm  []Other:    Lungs:  [x]Clear    []Other:    Abdomen: [x]Soft    []Other:    Mental Status: [x]Alert & Oriented  []Other:      PLANNED PROCEDURE   [x]Cath  []PCI                []Pacemaker/AICD  []MARYJANE             []Cardioversion []Peripheral angiography/PTA  []Other:      SEDATION  Planned agent: [x]Midazolam []Meperidine [x]Sublimaze []Morphine  []Diazepam  []Other:     ASA Classification:  []1 []2 [x]3 []4 []5  Class 1: A normal healthy patient  Class 2: Pt with mild to moderate systemic disease  Class 3: Severe systemic disease or disturbance  Class 4: Severe systemic disorders that are already life threatening. Class 5: Moribund pt with little chances of survival, for more than 24 hours. Mallampati I Airway Classification:   []1 [x]2 []3 []4    [x]Pre-procedure diagnostic studies complete and results available. Comment:    [x]Previous sedation/anesthesia experiences assessed. Comment:    [x]The patient is an appropriate candidate to undergo the planned procedure sedation and anesthesia. (Refer to nursing sedation/analgesia documentation record)  [x]Formulation and discussion of sedation/procedure plan, risks, and expectations with patient and/or responsible adult completed. [x]Patient examined immediately prior to the procedure.  (Refer to nursing sedation/analgesia documentation record)    Navin Resendez MD  Electronically signed 8/26/2022 at 3:18 PM

## 2022-08-26 NOTE — FLOWSHEET NOTE
Received from cath lab. Right groin stable. Pt aware to keep right leg still head down and not to cross her legs. Family at bedside. 0.9 normal saline cont. Denies pain or needs. Resting with easy resp.

## 2022-08-26 NOTE — PLAN OF CARE
Problem: Discharge Planning  Goal: Discharge to home or other facility with appropriate resources  8/26/2022 1451 by Margarita Kruger RN  Outcome: Completed  Flowsheets (Taken 8/26/2022 1354)  Discharge to home or other facility with appropriate resources: Identify barriers to discharge with patient and caregiver  8/26/2022 1306 by Margarita Kruger RN  Outcome: Progressing

## 2022-08-27 NOTE — FLOWSHEET NOTE
Discharge instructions with AVS review completed. Questions and concerns addressed. Right wrist and right groin cath sites stable. Iv fluids stopped. Iv catheter removed. Telemetry off. Preparing for discharge to home post heart catheterization.

## 2022-08-29 ENCOUNTER — TELEPHONE (OUTPATIENT)
Dept: FAMILY MEDICINE CLINIC | Age: 62
End: 2022-08-29

## 2022-08-29 NOTE — TELEPHONE ENCOUNTER
[facility] Transitions Initial Follow Up Call    Outreach made within 2 business days of discharge: Yes    Patient: Marty Vidales Patient : 1960   MRN: [id number]  Reason for Admission: There are no discharge diagnoses documented for the most recent discharge. Discharge Date: 22       Spoke with: Pt    Discharge department/facility: [facility]    TCM Interactive Patient Contact:  Was patient able to fill all prescriptions: Yes  Was patient instructed to bring all medications to the follow-up visit: Yes  Is patient taking all medications as directed in the discharge summary?  Yes  Does patient understand their discharge instructions: Yes  Does patient have questions or concerns that need addressed prior to 7-14 day follow up office visit: no    Scheduled appointment with PCP within 7-14 days    Follow Up  Future Appointments   Date Time Provider Ventura Watson   2022 10:15 AM Porter Freitas, [address]   2022  1:30 PM [facility] PULMONARY FUNCTION ROOM 2 [facility] PFT REBECCA [facility]ATT [address]   2022 12:00 PM Navin Resendez MD N [facility] - [address], [facility] ([address] [place] [address])

## 2022-09-02 ENCOUNTER — OFFICE VISIT (OUTPATIENT)
Dept: FAMILY MEDICINE CLINIC | Age: 62
End: 2022-09-02
Payer: COMMERCIAL

## 2022-09-02 VITALS
SYSTOLIC BLOOD PRESSURE: 122 MMHG | BODY MASS INDEX: 45.21 KG/M2 | RESPIRATION RATE: 20 BRPM | DIASTOLIC BLOOD PRESSURE: 78 MMHG | HEART RATE: 88 BPM | WEIGHT: 255.2 LBS

## 2022-09-02 DIAGNOSIS — R06.02 SOB (SHORTNESS OF BREATH) ON EXERTION: Primary | ICD-10-CM

## 2022-09-02 DIAGNOSIS — E66.01 MORBID OBESITY WITH BMI OF 40.0-44.9, ADULT (HCC): ICD-10-CM

## 2022-09-02 DIAGNOSIS — R42 LIGHTHEADEDNESS: ICD-10-CM

## 2022-09-02 DIAGNOSIS — R07.89 ATYPICAL CHEST PAIN: ICD-10-CM

## 2022-09-02 DIAGNOSIS — I25.10 NON-OCCLUSIVE CORONARY ARTERY DISEASE: ICD-10-CM

## 2022-09-02 PROCEDURE — 99214 OFFICE O/P EST MOD 30 MIN: CPT | Performed by: FAMILY MEDICINE

## 2022-09-02 RX ORDER — DICLOFENAC SODIUM 75 MG/1
1 TABLET, DELAYED RELEASE ORAL DAILY
COMMUNITY
Start: 2022-08-03

## 2022-09-02 ASSESSMENT — ENCOUNTER SYMPTOMS
GASTROINTESTINAL NEGATIVE: 1
SHORTNESS OF BREATH: 1

## 2022-09-02 NOTE — PROGRESS NOTES
Jessica Vallejo (:  1960) is a 60 y.o. female,Established patient, here for evaluation of the following chief complaint(s):  1 Month Follow-Up (SOB) and Health Maintenance (Needs a mammogram)        Subjective   SUBJECTIVE/OBJECTIVE:  HPI:    Chief Complaint   Patient presents with    1 Month Follow-Up     SOB    Health Maintenance     Needs a mammogram     1 month eval.      Since last visit she was seen by Cardio for abnormal stress test.  LHC showed non-obstructive disease. Now on ASA and metoprolol. BP Readings from Last 3 Encounters:   22 122/78   22 (!) 139/90   22 130/79     Still on the Symbicort. Not sure if helping at all. Using albuterol prn. Started eating better. Recently bought an exercise bike, has not started yet.     Wt Readings from Last 3 Encounters:   22 255 lb 3.2 oz (115.8 kg)   22 257 lb (116.6 kg)   22 257 lb 9.6 oz (116.8 kg)         Patient Active Problem List   Diagnosis    GERD (gastroesophageal reflux disease)    Adjustment disorder with depressed mood    MISHA (obstructive sleep apnea)    Chest pain, atypical    Abnormal nuclear stress test    Abnormal EKG    SOB (shortness of breath) on exertion    Morbid obesity due to excess calories (HCC)    S/P cardiac cath-22-LM-P, LAD MID 30%, LCX-P, RCA PROX 35% ECCENTRIC PLAQUE, EDP 21 MMHG, EF 50 TO 55%- MED RX    Non-occlusive coronary artery disease     Past Surgical History:   Procedure Laterality Date    ACHILLES TENDON SURGERY Left 2022    EXCISION OF POSTERIOR CALCANEAL HEEL SPUR LEFT HEEL, REATTACHMENT OF ACHILLES TENDON ON LEFT WITH SPIDER PLATE AND SCREW performed by Irma Knapp DPM at [facility]      rt foot    TUBAL LIGATION       Social History     Tobacco Use    Smoking status: Never    Smokeless tobacco: Never   Vaping Use    Vaping Use: Never used   Substance Use Topics    Alcohol use: Not Currently     Comment: occasionally    Drug use: No         Review of Systems   Constitutional: Negative. HENT: Negative. Respiratory:  Positive for shortness of breath. Cardiovascular: Negative. Gastrointestinal: Negative. Musculoskeletal: Negative. Neurological:  Positive for light-headedness. All other systems reviewed and are negative. Objective   Physical Exam  Vitals and nursing note reviewed. Constitutional:       General: She is not in acute distress. Appearance: Normal appearance. She is well-developed. HENT:      Head: Normocephalic and atraumatic. Right Ear: Tympanic membrane normal.      Left Ear: Tympanic membrane normal.   Eyes:      Conjunctiva/sclera: Conjunctivae normal.   Cardiovascular:      Rate and Rhythm: Normal rate and regular rhythm. Heart sounds: Normal heart sounds. No murmur heard. Pulmonary:      Effort: Pulmonary effort is normal.      Breath sounds: Normal breath sounds. No wheezing, rhonchi or rales. Abdominal:      General: There is no distension. Musculoskeletal:      Cervical back: Neck supple. Skin:     General: Skin is warm and dry. Findings: No rash (on exposed surfaces). Neurological:      General: No focal deficit present. Mental Status: She is alert. Psychiatric:         Attention and Perception: Attention normal.         Mood and Affect: Mood normal.         Speech: Speech normal.         Behavior: Behavior normal. Behavior is cooperative. Thought Content: Thought content normal.         Judgment: Judgment normal.             ASSESSMENT/PLAN:  1. SOB (shortness of breath) on exertion  2. Atypical chest pain  3. Non-occlusive coronary artery disease  4. Lightheadedness  5.  Morbid obesity with BMI of 40.0-44.9, adult ([facility] [address]    -  Chronic medical problems stable  -  Continue current medications for now  -  Follow up with specialists as scheduled  -  Stress/echo, [facility] reviewed  -  PFT scheduled 9/7, will call with results and recommendations    Return for as needed. An electronic signature was used to authenticate this note.     --Gali Torres, DO

## 2022-09-07 ENCOUNTER — HOSPITAL ENCOUNTER (OUTPATIENT)
Dept: PULMONOLOGY | Age: 62
Discharge: HOME | End: 2022-09-07
Payer: COMMERCIAL

## 2022-09-07 DIAGNOSIS — R06.02 SOB (SHORTNESS OF BREATH) ON EXERTION: ICD-10-CM

## 2022-09-07 DIAGNOSIS — Z77.22 HISTORY OF SECOND HAND SMOKE EXPOSURE: ICD-10-CM

## 2022-09-07 PROCEDURE — 94729 DIFFUSING CAPACITY: CPT

## 2022-09-07 PROCEDURE — 94060 EVALUATION OF WHEEZING: CPT

## 2022-09-07 PROCEDURE — 94726 PLETHYSMOGRAPHY LUNG VOLUMES: CPT

## 2022-09-07 RX ORDER — ALBUTEROL SULFATE 90 UG/1
2 AEROSOL, METERED RESPIRATORY (INHALATION) 4 TIMES DAILY PRN
Qty: 1 EACH | Refills: 5 | Status: SHIPPED | OUTPATIENT
Start: 2022-09-07

## 2022-09-16 ENCOUNTER — OFFICE VISIT (OUTPATIENT)
Dept: CARDIOLOGY CLINIC | Age: 62
End: 2022-09-16
Payer: COMMERCIAL

## 2022-09-16 VITALS
DIASTOLIC BLOOD PRESSURE: 82 MMHG | HEIGHT: 63 IN | BODY MASS INDEX: 45.61 KG/M2 | WEIGHT: 257.4 LBS | HEART RATE: 76 BPM | SYSTOLIC BLOOD PRESSURE: 130 MMHG

## 2022-09-16 DIAGNOSIS — Z98.890 S/P CARDIAC CATH: Primary | ICD-10-CM

## 2022-09-16 DIAGNOSIS — K21.9 GASTROESOPHAGEAL REFLUX DISEASE, UNSPECIFIED WHETHER ESOPHAGITIS PRESENT: ICD-10-CM

## 2022-09-16 DIAGNOSIS — G47.33 OSA (OBSTRUCTIVE SLEEP APNEA): ICD-10-CM

## 2022-09-16 DIAGNOSIS — R94.31 ABNORMAL EKG: ICD-10-CM

## 2022-09-16 DIAGNOSIS — R06.02 SOB (SHORTNESS OF BREATH) ON EXERTION: ICD-10-CM

## 2022-09-16 DIAGNOSIS — R42 DIZZINESS ON STANDING: ICD-10-CM

## 2022-09-16 DIAGNOSIS — R07.89 CHEST PAIN, ATYPICAL: ICD-10-CM

## 2022-09-16 DIAGNOSIS — E66.01 MORBID OBESITY DUE TO EXCESS CALORIES (HCC): ICD-10-CM

## 2022-09-16 PROBLEM — R94.39 ABNORMAL NUCLEAR STRESS TEST: Status: RESOLVED | Noted: 2022-08-18 | Resolved: 2022-09-16

## 2022-09-16 PROBLEM — I25.10 NON-OCCLUSIVE CORONARY ARTERY DISEASE: Status: RESOLVED | Noted: 2022-09-02 | Resolved: 2022-09-16

## 2022-09-16 PROCEDURE — 99214 OFFICE O/P EST MOD 30 MIN: CPT | Performed by: INTERNAL MEDICINE

## 2022-09-16 RX ORDER — TRIAMTERENE AND HYDROCHLOROTHIAZIDE 37.5; 25 MG/1; MG/1
0.5 TABLET ORAL DAILY
Qty: 30 TABLET | Refills: 3 | Status: SHIPPED | OUTPATIENT
Start: 2022-09-16

## 2022-09-16 NOTE — PROGRESS NOTES
Karen Referred for sob on exertion and abn stress tets  Abnormal stress test f/u.            3 week cath follow up. EKG done 8-26-22. Occasional atypical cp for a while   Unchanged after cath    Sob on exertion    Sob on exertion progressively worse over 2 yrs- worse in the last couple of months  Gained more wt in the last 1 yr after foot surgery    Occasional dizziness on standing- frequent     Denied palpitation    Trace leg edema since foot surgery    Had Foot left several months back       Nonsmoker    Fhx    fATHER   HAD cva/tia, cabg IN HIS 70'S        Past Surgical History:   Procedure Laterality Date    ACHILLES TENDON SURGERY Left 1/12/2022    EXCISION OF POSTERIOR CALCANEAL HEEL SPUR LEFT HEEL, REATTACHMENT OF ACHILLES TENDON ON LEFT WITH SPIDER PLATE AND SCREW performed by Anh Guerra DPM at [facility]      rt foot    TUBAL LIGATION         Allergies   Allergen Reactions    Sulfa Antibiotics Swelling     Feet swelled        Family History   Problem Relation Age of Onset    Arthritis Mother     Stroke Mother     Diabetes Father     Stroke Father     High Blood Pressure Father     Breast Cancer Sister     Amyotrophic lateral sclerosis Brother         Social History     Socioeconomic History    Marital status:       Spouse name: Not on file    Number of children: 4    Years of education: 12    Highest education level: High school graduate   Occupational History    Not on file   Tobacco Use    Smoking status: Never    Smokeless tobacco: Never   Vaping Use    Vaping Use: Never used   Substance and Sexual Activity    Alcohol use: Not Currently     Comment: occasionally    Drug use: No    Sexual activity: Not on file   Other Topics Concern    Not on file   Social History Narrative    Not on file     Social Determinants of Health     Financial Resource Strain: Low Risk     Difficulty of Paying Living Expenses: Not hard at all Food Insecurity: No Food Insecurity    Worried About Running Out of Food in the Last Year: Never true    Ran Out of Food in the Last Year: Never true   Transportation Needs: Not on file   Physical Activity: Not on file   Stress: Not on file   Social Connections: Not on file   Intimate Partner Violence: Not on file   Housing Stability: Not on file       Current Outpatient Medications   Medication Sig Dispense Refill    triamterene-hydroCHLOROthiazide (MAXZIDE-25) 37.5-25 MG per tablet Take 0.5 tablets by mouth daily 30 tablet 3    albuterol sulfate HFA (VENTOLIN HFA) 108 (90 Base) MCG/ACT inhaler Inhale 2 puffs into the lungs 4 times daily as needed for Wheezing or Shortness of Breath 1 each 5    diclofenac (VOLTAREN) 75 MG EC tablet Take 1 tablet by mouth daily      aspirin EC 81 MG EC tablet Take 1 tablet by mouth daily 90 tablet 1    metoprolol tartrate (LOPRESSOR) 25 MG tablet Take 0.5 tablets by mouth 2 times daily 30 tablet 1    budesonide-formoterol (SYMBICORT) 80-4.5 MCG/ACT AERO Inhale 2 puffs into the lungs in the morning and 2 puffs in the evening. Rinse mouth after use. . 10.2 g 1    Multiple Vitamins-Minerals (MULTIVITAMIN ADULTS PO) Take by mouth daily       No current facility-administered medications for this visit. Review of Systems -     General ROS: negative  Psychological ROS: negative  Hematological and Lymphatic ROS: No history of blood clots or bleeding disorder. Respiratory ROS: no cough,  or wheezing, the rest see HPI  Cardiovascular ROS: See HPI  Gastrointestinal ROS: negative  Genito-Urinary ROS: no dysuria, trouble voiding, or hematuria  Musculoskeletal ROS: negative  Neurological ROS: no TIA or stroke symptoms  Dermatological ROS: negative      Blood pressure 130/82, pulse 76, height 5' 3\" (1.6 m), weight 257 lb 6.4 oz (116.8 kg), not currently breastfeeding.         Physical Examination:    General appearance - alert, well appearing, and in no distress  HEENT- Pink conjunctiva  , Non-icteri sclera,PERRLA  Mental status - alert, oriented to person, place, and time  Neck - supple, no significant adenopathy, no JVD, or carotid bruits  Chest - clear to auscultation, no wheezes, rales or rhonchi, symmetric air entry  Heart - normal rate, regular rhythm, normal S1, S2, no murmurs, rubs, clicks or gallops  Abdomen - soft, nontender, nondistended, no masses or organomegaly  DEEDEE- no CVA or flank tenderness, no suprapubic tenderness  Neurological - alert, oriented, normal speech, no focal findings or movement disorder noted  Musculoskeletal/limbs - no joint tenderness, deformity or swelling   - peripheral pulses normal, no pedal edema, no clubbing or cyanosis  Skin - normal coloration and turgor, no rashes, no suspicious skin lesions noted  Psych- appropriate mood and affect    Lab  No results for input(s): CKTOTAL, CKMB, CKMBINDEX, TROPONINI in the last 72 hours.   CBC:   Lab Results   Component Value Date/Time    WBC 6.5 08/26/2022 01:30 PM    RBC 4.59 08/26/2022 01:30 PM    HGB 12.6 08/26/2022 01:30 PM    HCT 39.1 08/26/2022 01:30 PM    MCV 85.2 08/26/2022 01:30 PM    MCH 27.5 08/26/2022 01:30 PM    MCHC 32.2 08/26/2022 01:30 PM     08/26/2022 01:30 PM    MPV 10.7 08/26/2022 01:30 PM     BMP:    Lab Results   Component Value Date/Time     08/26/2022 01:30 PM    K 4.3 08/26/2022 01:30 PM     08/26/2022 01:30 PM    CO2 22 08/26/2022 01:30 PM    BUN 26 08/26/2022 01:30 PM    LABALBU 4.3 01/05/2022 02:17 PM    CREATININE 0.6 08/26/2022 01:30 PM    CALCIUM 9.3 08/26/2022 01:30 PM    LABGLOM >90 08/26/2022 01:30 PM    GLUCOSE 103 08/26/2022 01:30 PM     Hepatic Function Panel:    Lab Results   Component Value Date/Time    ALKPHOS 96 01/05/2022 02:17 PM    ALT 28 05/23/2022 09:57 AM    AST 26 01/05/2022 02:17 PM    PROT 7.7 01/05/2022 02:17 PM    BILITOT 1.1 01/05/2022 02:17 PM    LABALBU 4.3 01/05/2022 02:17 PM     Magnesium:  No results found for: MG  Warfarin PT/INR:  No components found for: PTPATWAR, Hebert Goodell  HgBA1c:    Lab Results   Component Value Date/Time    LABA1C 6.2 06/01/2022 10:28 AM     FLP:    Lab Results   Component Value Date/Time    TRIG 77 08/26/2022 01:30 PM    HDL 56 08/26/2022 01:30 PM    LDLCALC 121 08/26/2022 01:30 PM     TSH:    Lab Results   Component Value Date/Time    TSH 1.810 01/16/2021 11:35 AM        Conclusions      Summary   Normal left ventricle size and systolic function. Ejection fraction was   estimated at 55 %. There were no regional left ventricular wall motion   abnormalities and wall thickness was within normal limits. Doppler parameters were consistent with abnormal left ventricular   relaxation (grade 1 diastolic dysfunction). Signature      ----------------------------------------------------------------   Electronically signed by Rony Tapia MD (Interpreting   physician) on 08/12/2022 at 10:23 PM   ----------------------------------------------------------------          Conclusions      Summary   The nuclear images is suggestive for mILD myocardial ischemia IN tHE   ANTEROAPICAL WALL. Attenuation artifact related abnormality can not be excluded with   certainty. Recommendation   Clinical correlation is recommended. Signatures      ----------------------------------------------------------------   Electronically signed by Rony Tapia MD (Interpreting   Cardiologist) on 08/12/2022 at 19:06    EKG 01/05/22  Nsr, OLD INFERIOR I=FARCT, NO ACUTE ABN      Assessment   Diagnosis Orders   1. S/P cardiac cath-8/26/22-LM-P, LAD MID 30%, LCX-P, RCA PROX 35% ECCENTRIC PLAQUE, EDP 21 MMHG, EF 50 TO 55%- MED RX        2. SOB (shortness of breath) on exertion        3. Dizziness on standing        4. Morbid obesity due to excess calories ([address]        5. Abnormal EKG        6. MISHA (obstructive sleep apnea)        7.  Gastroesophageal reflux disease, unspecified whether esophagitis present        8. hx of Chest pain, atypical- noncardiac Plan     Continue the current treatment and with constant vigilance to changes in symptoms and also any potential side effects. Return for care or seek medical attention immediately if symptoms got worse and/or develop new symptoms.     SOB ON EXERTION  ABN STRESS AND ABN EKG- cath nonosbtructive  The PFT- WNL  Elevated edp 21 mmhg  Orthopnea of 2 to 3 pillow for long time  Cont  ASA 81  Trace leg edema RT and +1 left  Stop lopressor 12.5 po bid  Occasional dizziness  Hydration  Start maxzide 25  1/2 tab po qd and see if sob get better and leg edema    BMP and MG in 2 months  Cath site look good      D/w the pat at length plan of care       RTC IN  2 month with bmp and see if sob get better    Roseline [facility]

## 2022-11-11 ENCOUNTER — OFFICE VISIT (OUTPATIENT)
Dept: CARDIOLOGY CLINIC | Age: 62
End: 2022-11-11
Payer: COMMERCIAL

## 2022-11-11 VITALS
HEIGHT: 63 IN | SYSTOLIC BLOOD PRESSURE: 128 MMHG | WEIGHT: 257.5 LBS | HEART RATE: 104 BPM | BODY MASS INDEX: 45.62 KG/M2 | DIASTOLIC BLOOD PRESSURE: 88 MMHG

## 2022-11-11 DIAGNOSIS — R06.02 SOB (SHORTNESS OF BREATH) ON EXERTION: ICD-10-CM

## 2022-11-11 DIAGNOSIS — Z98.890 S/P CARDIAC CATH: ICD-10-CM

## 2022-11-11 DIAGNOSIS — R42 DIZZINESS ON STANDING: Primary | ICD-10-CM

## 2022-11-11 DIAGNOSIS — R60.0 BILATERAL LEG EDEMA: ICD-10-CM

## 2022-11-11 DIAGNOSIS — E66.01 MORBID OBESITY DUE TO EXCESS CALORIES (HCC): ICD-10-CM

## 2022-11-11 DIAGNOSIS — E87.70 HYPERVOLEMIA, UNSPECIFIED HYPERVOLEMIA TYPE: ICD-10-CM

## 2022-11-11 PROCEDURE — 99213 OFFICE O/P EST LOW 20 MIN: CPT | Performed by: INTERNAL MEDICINE

## 2022-11-11 RX ORDER — TRIAMTERENE AND HYDROCHLOROTHIAZIDE 37.5; 25 MG/1; MG/1
0.5 TABLET ORAL DAILY
Qty: 30 TABLET | Refills: 3 | Status: SHIPPED | OUTPATIENT
Start: 2022-11-11

## 2022-11-11 NOTE — PROGRESS NOTES
Karen Referred for sob on exertion and abn stress tets  Abnormal stress test f/u. PT HERE FOR 2 MONTH CHECK UP    AFTER MAXZIDE THE LEG EDEMA BETTER AND SOB BETTER    NO MORE CHEST PAIN    hX OF Occasional atypical cp for a while FOR WHICH HAD CATH AND NONOBSTRUCTIVE     Sob on exertion STILL THERE    Sob on exertion progressively worse over 2 yrs- worse in the last couple of months  Gained more wt in the last 1 yr after foot surgery    Occasional dizziness on standing- BETTER AFTER HYDRATION- 2 BOTTLE OF WATER    Denied palpitation    Trace leg edema since foot surgery    Had Foot left several months back     Nonsmoker    Fhx    fATHER   HAD cva/tia, cabg IN HIS 74'S        Past Surgical History:   Procedure Laterality Date    ACHILLES TENDON SURGERY Left 1/12/2022    EXCISION OF POSTERIOR CALCANEAL HEEL SPUR LEFT HEEL, REATTACHMENT OF ACHILLES TENDON ON LEFT WITH SPIDER PLATE AND SCREW performed by Miri Rangel DPM at [facility]      rt foot    TUBAL LIGATION         Allergies   Allergen Reactions    Sulfa Antibiotics Swelling     Feet swelled        Family History   Problem Relation Age of Onset    Arthritis Mother     Stroke Mother     Diabetes Father     Stroke Father     High Blood Pressure Father     Breast Cancer Sister     Amyotrophic lateral sclerosis Brother         Social History     Socioeconomic History    Marital status:       Spouse name: Not on file    Number of children: 4    Years of education: 12    Highest education level: High school graduate   Occupational History    Not on file   Tobacco Use    Smoking status: Never    Smokeless tobacco: Never   Vaping Use    Vaping Use: Never used   Substance and Sexual Activity    Alcohol use: Not Currently     Comment: occasionally    Drug use: No    Sexual activity: Not on file   Other Topics Concern    Not on file   Social History Narrative    Not on file     Social Determinants of Health     Financial Resource Strain: Low Risk     Difficulty of Paying Living Expenses: Not hard at all   Food Insecurity: No Food Insecurity    Worried About Running Out of Food in the Last Year: Never true    Ran Out of Food in the Last Year: Never true   Transportation Needs: Not on file   Physical Activity: Not on file   Stress: Not on file   Social Connections: Not on file   Intimate Partner Violence: Not on file   Housing Stability: Not on file       Current Outpatient Medications   Medication Sig Dispense Refill    triamterene-hydroCHLOROthiazide (MAXZIDE-25) 37.5-25 MG per tablet Take 0.5 tablets by mouth daily 30 tablet 3    albuterol sulfate HFA (VENTOLIN HFA) 108 (90 Base) MCG/ACT inhaler Inhale 2 puffs into the lungs 4 times daily as needed for Wheezing or Shortness of Breath 1 each 5    diclofenac (VOLTAREN) 75 MG EC tablet Take 1 tablet by mouth daily      aspirin EC 81 MG EC tablet Take 1 tablet by mouth daily 90 tablet 1    budesonide-formoterol (SYMBICORT) 80-4.5 MCG/ACT AERO Inhale 2 puffs into the lungs in the morning and 2 puffs in the evening. Rinse mouth after use. . 10.2 g 1    Multiple Vitamins-Minerals (MULTIVITAMIN ADULTS PO) Take by mouth daily       No current facility-administered medications for this visit. Review of Systems -     General ROS: negative  Psychological ROS: negative  Hematological and Lymphatic ROS: No history of blood clots or bleeding disorder. Respiratory ROS: no cough,  or wheezing, the rest see HPI  Cardiovascular ROS: See HPI  Gastrointestinal ROS: negative  Genito-Urinary ROS: no dysuria, trouble voiding, or hematuria  Musculoskeletal ROS: negative  Neurological ROS: no TIA or stroke symptoms  Dermatological ROS: negative      Blood pressure 128/88, pulse (!) 104, height 5' 3\" (1.6 m), weight 257 lb 8 oz (116.8 kg), not currently breastfeeding.         Physical Examination:    General appearance - alert, well appearing, and in no distress  HEENT- Pink conjunctiva  , Non-icteri sclera,PERRLA  Mental status - alert, oriented to person, place, and time  Neck - supple, no significant adenopathy, no JVD, or carotid bruits  Chest - clear to auscultation, no wheezes, rales or rhonchi, symmetric air entry  Heart - normal rate, regular rhythm, normal S1, S2, no murmurs, rubs, clicks or gallops  Abdomen - soft, nontender, nondistended, no masses or organomegaly  DEEDEE- no CVA or flank tenderness, no suprapubic tenderness  Neurological - alert, oriented, normal speech, no focal findings or movement disorder noted  Musculoskeletal/limbs - no joint tenderness, deformity or swelling   - peripheral pulses normal, no pedal edema, no clubbing or cyanosis  Skin - normal coloration and turgor, no rashes, no suspicious skin lesions noted  Psych- appropriate mood and affect    Lab  No results for input(s): CKTOTAL, CKMB, CKMBINDEX, TROPONINI in the last 72 hours.   CBC:   Lab Results   Component Value Date/Time    WBC 6.5 08/26/2022 01:30 PM    RBC 4.59 08/26/2022 01:30 PM    HGB 13.0 11/05/2022 11:41 AM    HCT 40.7 11/05/2022 11:41 AM    MCV 85.2 08/26/2022 01:30 PM    MCH 27.5 08/26/2022 01:30 PM    MCHC 32.2 08/26/2022 01:30 PM     08/26/2022 01:30 PM    MPV 10.7 08/26/2022 01:30 PM     BMP:    Lab Results   Component Value Date/Time     08/26/2022 01:30 PM    K 4.3 08/26/2022 01:30 PM     08/26/2022 01:30 PM    CO2 22 08/26/2022 01:30 PM    BUN 26 08/26/2022 01:30 PM    LABALBU 4.3 01/05/2022 02:17 PM    CREATININE 0.9 11/05/2022 11:41 AM    CALCIUM 9.3 08/26/2022 01:30 PM    LABGLOM >60 11/05/2022 11:41 AM    GLUCOSE 103 08/26/2022 01:30 PM     Hepatic Function Panel:    Lab Results   Component Value Date/Time    ALKPHOS 96 01/05/2022 02:17 PM    ALT 53 11/05/2022 11:41 AM    AST 26 01/05/2022 02:17 PM    PROT 7.7 01/05/2022 02:17 PM    BILITOT 1.1 01/05/2022 02:17 PM    LABALBU 4.3 01/05/2022 02:17 PM     Magnesium:  No results found for: MG  Warfarin PT/INR:  No components found for: PTPATWAR, PTINRWAR  HgBA1c:    Lab Results   Component Value Date/Time    LABA1C 6.2 06/01/2022 10:28 AM     FLP:    Lab Results   Component Value Date/Time    TRIG 77 08/26/2022 01:30 PM    HDL 56 08/26/2022 01:30 PM    LDLCALC 121 08/26/2022 01:30 PM     TSH:    Lab Results   Component Value Date/Time    TSH 1.810 01/16/2021 11:35 AM        Conclusions      Summary   Normal left ventricle size and systolic function. Ejection fraction was   estimated at 55 %. There were no regional left ventricular wall motion   abnormalities and wall thickness was within normal limits. Doppler parameters were consistent with abnormal left ventricular   relaxation (grade 1 diastolic dysfunction). Signature      ----------------------------------------------------------------   Electronically signed by Miriam Hall MD (Interpreting   physician) on 08/12/2022 at 10:23 PM   ----------------------------------------------------------------          Conclusions      Summary   The nuclear images is suggestive for mILD myocardial ischemia IN tHE   ANTEROAPICAL WALL. Attenuation artifact related abnormality can not be excluded with   certainty. Recommendation   Clinical correlation is recommended. Signatures      ----------------------------------------------------------------   Electronically signed by Miriam Hall MD (Interpreting   Cardiologist) on 08/12/2022 at 19:06    EKG 01/05/22  Nsr, OLD INFERIOR I=FARCT, NO ACUTE ABN      Assessment   Diagnosis Orders   1. Dizziness on standing        2. SOB (shortness of breath) on exertion        3. Bilateral leg edema +1  NOW AND NONE        4. Hypervolemia, unspecified hypervolemia type        5. Morbid obesity due to excess calories ([address]        6.  S/P cardiac cath-8/26/22-LM-P, LAD MID 30%, LCX-P, RCA PROX 35% ECCENTRIC PLAQUE, EDP 21 MMHG, EF 50 TO 55%- MED RX                  Plan     THE CURRENT MEDS AND LABS REVIEWED    Continue the current treatment and with constant vigilance to changes in symptoms and also any potential side effects. Return for care or seek medical attention immediately if symptoms got worse and/or develop new symptoms.     SOB ON EXERTION  ABN STRESS AND ABN EKG- cath nonosbtructive  The PFT- WNL  Elevated edp 21 mmhg  Orthopnea of 2 to 3 pillow for long time  Cont  ASA 81  Trace leg edema RT and +1 left- RESOLVED   Stop lopressor 12.5 po bid  Occasional dizziness- BETTER  Hydration BETTER NOW 2 BOTTLE   CONT  maxzide 25  1/2 tab po qd and see if sob get better and leg edema    BMP and MG in NEXT VISIT    D/w the pat at length plan of care    OVERALL BETTER WITH LEG EDEMA AND SOME WITH SOB       RTC IN  2 month with bmp and see if sob get better    Enoc Chambers MD,MD,FACC

## 2023-02-27 ENCOUNTER — NURSE ONLY (OUTPATIENT)
Dept: LAB | Age: 63
End: 2023-02-27

## 2023-02-27 DIAGNOSIS — Z98.890 S/P CARDIAC CATH: ICD-10-CM

## 2023-02-27 DIAGNOSIS — R60.0 BILATERAL LEG EDEMA: ICD-10-CM

## 2023-02-27 DIAGNOSIS — E87.70 HYPERVOLEMIA, UNSPECIFIED HYPERVOLEMIA TYPE: ICD-10-CM

## 2023-02-27 DIAGNOSIS — R42 DIZZINESS ON STANDING: ICD-10-CM

## 2023-02-27 DIAGNOSIS — R06.02 SOB (SHORTNESS OF BREATH) ON EXERTION: ICD-10-CM

## 2023-02-27 DIAGNOSIS — E66.01 MORBID OBESITY DUE TO EXCESS CALORIES (HCC): ICD-10-CM

## 2023-02-27 LAB
ANION GAP SERPL CALC-SCNC: 14 MEQ/L (ref 8–16)
BUN SERPL-MCNC: 22 MG/DL (ref 7–22)
CALCIUM SERPL-MCNC: 9.4 MG/DL (ref 8.5–10.5)
CHLORIDE SERPL-SCNC: 103 MEQ/L (ref 98–111)
CO2 SERPL-SCNC: 24 MEQ/L (ref 23–33)
CREAT SERPL-MCNC: 0.9 MG/DL (ref 0.4–1.2)
GFR SERPL CREATININE-BSD FRML MDRD: > 60 ML/MIN/1.73M2
GLUCOSE SERPL-MCNC: 149 MG/DL (ref 70–108)
MAGNESIUM SERPL-MCNC: 1.9 MG/DL (ref 1.6–2.4)
POTASSIUM SERPL-SCNC: 4.2 MEQ/L (ref 3.5–5.2)
SODIUM SERPL-SCNC: 141 MEQ/L (ref 135–145)

## 2023-02-28 SDOH — ECONOMIC STABILITY: FOOD INSECURITY: WITHIN THE PAST 12 MONTHS, THE FOOD YOU BOUGHT JUST DIDN'T LAST AND YOU DIDN'T HAVE MONEY TO GET MORE.: SOMETIMES TRUE

## 2023-02-28 SDOH — ECONOMIC STABILITY: HOUSING INSECURITY
IN THE LAST 12 MONTHS, WAS THERE A TIME WHEN YOU DID NOT HAVE A STEADY PLACE TO SLEEP OR SLEPT IN A SHELTER (INCLUDING NOW)?: NO

## 2023-02-28 SDOH — ECONOMIC STABILITY: TRANSPORTATION INSECURITY
IN THE PAST 12 MONTHS, HAS LACK OF TRANSPORTATION KEPT YOU FROM MEETINGS, WORK, OR FROM GETTING THINGS NEEDED FOR DAILY LIVING?: NO

## 2023-02-28 SDOH — ECONOMIC STABILITY: FOOD INSECURITY: WITHIN THE PAST 12 MONTHS, YOU WORRIED THAT YOUR FOOD WOULD RUN OUT BEFORE YOU GOT MONEY TO BUY MORE.: SOMETIMES TRUE

## 2023-02-28 SDOH — ECONOMIC STABILITY: INCOME INSECURITY: HOW HARD IS IT FOR YOU TO PAY FOR THE VERY BASICS LIKE FOOD, HOUSING, MEDICAL CARE, AND HEATING?: SOMEWHAT HARD

## 2023-03-01 ENCOUNTER — OFFICE VISIT (OUTPATIENT)
Dept: FAMILY MEDICINE CLINIC | Age: 63
End: 2023-03-01
Payer: COMMERCIAL

## 2023-03-01 VITALS
BODY MASS INDEX: 45.77 KG/M2 | HEART RATE: 108 BPM | DIASTOLIC BLOOD PRESSURE: 84 MMHG | RESPIRATION RATE: 20 BRPM | WEIGHT: 258.4 LBS | SYSTOLIC BLOOD PRESSURE: 126 MMHG

## 2023-03-01 DIAGNOSIS — M54.50 THORACOLUMBAR BACK PAIN: Primary | ICD-10-CM

## 2023-03-01 DIAGNOSIS — E66.01 MORBID OBESITY WITH BMI OF 40.0-44.9, ADULT (HCC): ICD-10-CM

## 2023-03-01 DIAGNOSIS — M54.6 THORACOLUMBAR BACK PAIN: Primary | ICD-10-CM

## 2023-03-01 DIAGNOSIS — R73.01 IFG (IMPAIRED FASTING GLUCOSE): ICD-10-CM

## 2023-03-01 PROCEDURE — 99213 OFFICE O/P EST LOW 20 MIN: CPT | Performed by: FAMILY MEDICINE

## 2023-03-01 RX ORDER — ALBUTEROL SULFATE 90 UG/1
2 AEROSOL, METERED RESPIRATORY (INHALATION) 4 TIMES DAILY PRN
Qty: 1 EACH | Refills: 5 | Status: SHIPPED | OUTPATIENT
Start: 2023-03-01

## 2023-03-01 RX ORDER — BUDESONIDE AND FORMOTEROL FUMARATE DIHYDRATE 80; 4.5 UG/1; UG/1
2 AEROSOL RESPIRATORY (INHALATION) 2 TIMES DAILY
Qty: 10.2 G | Refills: 5 | Status: SHIPPED | OUTPATIENT
Start: 2023-03-01

## 2023-03-01 RX ORDER — TIZANIDINE 4 MG/1
4 TABLET ORAL NIGHTLY
Qty: 10 TABLET | Refills: 0 | Status: SHIPPED | OUTPATIENT
Start: 2023-03-01

## 2023-03-01 RX ORDER — METHYLPREDNISOLONE 4 MG/1
TABLET ORAL
Qty: 1 KIT | Refills: 0 | Status: SHIPPED | OUTPATIENT
Start: 2023-03-01 | End: 2023-03-07

## 2023-03-01 ASSESSMENT — PATIENT HEALTH QUESTIONNAIRE - PHQ9
SUM OF ALL RESPONSES TO PHQ QUESTIONS 1-9: 0
SUM OF ALL RESPONSES TO PHQ QUESTIONS 1-9: 0
SUM OF ALL RESPONSES TO PHQ9 QUESTIONS 1 & 2: 0
1. LITTLE INTEREST OR PLEASURE IN DOING THINGS: 0
2. FEELING DOWN, DEPRESSED OR HOPELESS: 0
SUM OF ALL RESPONSES TO PHQ QUESTIONS 1-9: 0
SUM OF ALL RESPONSES TO PHQ QUESTIONS 1-9: 0

## 2023-03-01 ASSESSMENT — ENCOUNTER SYMPTOMS
GASTROINTESTINAL NEGATIVE: 1
RESPIRATORY NEGATIVE: 1

## 2023-03-01 NOTE — PROGRESS NOTES
Hilda Estrada (:  1960) is a 58 y.o. female,Established patient, here for evaluation of the following chief complaint(s):  Back Pain, Discuss Labs (Elevated blood sugar), and Medication Refill        Subjective   SUBJECTIVE/OBJECTIVE:  HPI:   Chief Complaint   Patient presents with    Back Pain    Discuss Labs     Elevated blood sugar    Medication Refill     Pt presents today with c/o LBP with radiation to bilateral flank regions and upper quadrants for the last 3 weeks. Worse with turning a certain way. No recent injury. No urinary symptoms. BMs regular. Aleve with some relief. Hard time sleeping due to the pain. Pt also concerned about elevated BS's. Had recent labs draw where it was elevated. Non-fasting.   Lab Results   Component Value Date    LABA1C 6.2 2022    LABA1C 6.0 2021    LABA1C 5.9 2021     Lab Results   Component Value Date    LDLCALC 121 2022    CREATININE 0.9 2023         Patient Active Problem List   Diagnosis    GERD (gastroesophageal reflux disease)    Adjustment disorder with depressed mood    MISHA (obstructive sleep apnea)    hx of Chest pain, atypical- noncardiac     Abnormal EKG    SOB (shortness of breath) on exertion    Morbid obesity due to excess calories (HCC)    S/P cardiac cath-22-LM-P, LAD MID 30%, LCX-P, RCA PROX 35% ECCENTRIC PLAQUE, EDP 21 MMHG, EF 50 TO 55%- MED RX    Dizziness on standing    Fluid overload, unspecified    Bilateral leg edema +1  NOW AND NONE     Past Surgical History:   Procedure Laterality Date    ACHILLES TENDON SURGERY Left 2022    EXCISION OF POSTERIOR CALCANEAL HEEL SPUR LEFT HEEL, REATTACHMENT OF ACHILLES TENDON ON LEFT WITH SPIDER PLATE AND SCREW performed by Selam Gallego DPM at [facility]      rt foot    TUBAL LIGATION       Social History     Tobacco Use    Smoking status: Never    Smokeless tobacco: Never   Vaping Use    Vaping Use: Never used   Substance Use Topics    Alcohol use: Not Currently     Comment: occasionally    Drug use: No         Review of Systems   Constitutional: Negative. HENT: Negative. Respiratory: Negative. Cardiovascular: Negative. Gastrointestinal: Negative. Musculoskeletal:  Positive for back pain and gait problem. All other systems reviewed and are negative. Objective   Physical Exam  Vitals and nursing note reviewed. Constitutional:       General: She is not in acute distress. Appearance: Normal appearance. She is well-developed. HENT:      Head: Normocephalic and atraumatic. Right Ear: Tympanic membrane normal.      Left Ear: Tympanic membrane normal.   Eyes:      Conjunctiva/sclera: Conjunctivae normal.   Cardiovascular:      Rate and Rhythm: Normal rate and regular rhythm. Heart sounds: Normal heart sounds. No murmur heard. Pulmonary:      Effort: Pulmonary effort is normal.      Breath sounds: Normal breath sounds. No wheezing, rhonchi or rales. Abdominal:      General: There is no distension. Musculoskeletal:      Cervical back: Neck supple. Thoracic back: Tenderness present. No spasms. Decreased range of motion. Back:    Skin:     General: Skin is warm and dry. Findings: No rash (on exposed surfaces). Neurological:      General: No focal deficit present. Mental Status: She is alert. Psychiatric:         Attention and Perception: Attention normal.         Mood and Affect: Mood normal.         Speech: Speech normal.         Behavior: Behavior normal. Behavior is cooperative. Thought Content: Thought content normal.         Judgment: Judgment normal.             ASSESSMENT/PLAN:  1. Thoracolumbar back pain  -     methylPREDNISolone (MEDROL, JHONNY,) 4 MG tablet; As directed, Disp-1 kit, R-0Normal  -     tiZANidine (ZANAFLEX) 4 MG tablet; Take 1 tablet by mouth at bedtime, Disp-10 tablet, R-0Normal  2.  IFG (impaired fasting glucose)  3. Morbid obesity with BMI of 40.0-44.9, adult ([facility] [address]    -  Orders above  -  Home exercises  -  Weight loss encouraged    Return if symptoms worsen or fail to improve. An electronic signature was used to authenticate this note.     --Fahad Shaw, DO

## 2023-03-02 ASSESSMENT — ENCOUNTER SYMPTOMS: BACK PAIN: 1

## 2023-03-03 ENCOUNTER — OFFICE VISIT (OUTPATIENT)
Dept: CARDIOLOGY CLINIC | Age: 63
End: 2023-03-03
Payer: COMMERCIAL

## 2023-03-03 VITALS
WEIGHT: 256 LBS | HEIGHT: 63 IN | HEART RATE: 99 BPM | SYSTOLIC BLOOD PRESSURE: 143 MMHG | DIASTOLIC BLOOD PRESSURE: 71 MMHG | BODY MASS INDEX: 45.36 KG/M2

## 2023-03-03 DIAGNOSIS — Z98.890 S/P CARDIAC CATH: ICD-10-CM

## 2023-03-03 DIAGNOSIS — R42 DIZZINESS ON STANDING: ICD-10-CM

## 2023-03-03 DIAGNOSIS — R06.02 SOB (SHORTNESS OF BREATH) ON EXERTION: ICD-10-CM

## 2023-03-03 DIAGNOSIS — E66.01 MORBID OBESITY DUE TO EXCESS CALORIES (HCC): ICD-10-CM

## 2023-03-03 DIAGNOSIS — R60.0 BILATERAL LEG EDEMA: Primary | ICD-10-CM

## 2023-03-03 DIAGNOSIS — R94.31 ABNORMAL EKG: ICD-10-CM

## 2023-03-03 PROCEDURE — 99214 OFFICE O/P EST MOD 30 MIN: CPT | Performed by: INTERNAL MEDICINE

## 2023-03-03 NOTE — PROGRESS NOTES
Karen Referred for sob on exertion and abn stress tets  Abnormal stress test f/u. Pt here for 4 mo check up     EKG done 8/26/22    No wt gain    Hx of Leg edema none but  now resolved only puffiness  AFTER MAXZIDE THE LEG EDEMA BETTER AND SOB BETTER    Sob on exertion  chronic and stable  Sob on exertion progressively worse over 2 yrs- worse in the last couple of months  Gained more wt in the last 1 yr after foot surgery    Chronic stable Occasional dizziness on standing- BETTER AFTER HYDRATION- 2 BOTTLE OF WATER     NO MORE CHEST PAIN  Denied palpitation, chest pain,    hX OF Occasional atypical cp for a while FOR WHICH HAD CATH AND NONOBSTRUCTIVE     Nonsmoker    Fhx    fATHER   HAD cva/tia, cabg IN HIS 70'S        Past Surgical History:   Procedure Laterality Date    ACHILLES TENDON SURGERY Left 1/12/2022    EXCISION OF POSTERIOR CALCANEAL HEEL SPUR LEFT HEEL, REATTACHMENT OF ACHILLES TENDON ON LEFT WITH SPIDER PLATE AND SCREW performed by Марина Rader DPM at [facility]      rt foot    TUBAL LIGATION         Allergies   Allergen Reactions    Sulfa Antibiotics Swelling     Feet swelled        Family History   Problem Relation Age of Onset    Arthritis Mother     Stroke Mother     Diabetes Father     Stroke Father     High Blood Pressure Father     Breast Cancer Sister     Amyotrophic lateral sclerosis Brother         Social History     Socioeconomic History    Marital status:       Spouse name: Not on file    Number of children: 4    Years of education: 12    Highest education level: High school graduate   Occupational History    Not on file   Tobacco Use    Smoking status: Never    Smokeless tobacco: Never   Vaping Use    Vaping Use: Never used   Substance and Sexual Activity    Alcohol use: Not Currently     Comment: occasionally    Drug use: No    Sexual activity: Not on file   Other Topics Concern    Not on file   Social History Narrative    Not on file     Social Determinants of Health     Financial Resource Strain: Medium Risk    Difficulty of Paying Living Expenses: Somewhat hard   Food Insecurity: Food Insecurity Present    Worried About [address] in the Last Year: Sometimes true    Ran Out of Food in the Last Year: Sometimes true   Transportation Needs: Unknown    Lack of Transportation (Medical): Not on file    Lack of Transportation (Non-Medical): No   Physical Activity: Not on file   Stress: Not on file   Social Connections: Not on file   Intimate Partner Violence: Not on file   Housing Stability: Unknown    Unable to Pay for Housing in the Last Year: Not on file    Number of Places Lived in the Last Year: Not on file    Unstable Housing in the Last Year: No       Current Outpatient Medications   Medication Sig Dispense Refill    albuterol sulfate HFA (VENTOLIN HFA) 108 (90 Base) MCG/ACT inhaler Inhale 2 puffs into the lungs 4 times daily as needed for Wheezing or Shortness of Breath 1 each 5    budesonide-formoterol (SYMBICORT) 80-4.5 MCG/ACT AERO Inhale 2 puffs into the lungs in the morning and 2 puffs in the evening. Rinse mouth after use. . 10.2 g 5    methylPREDNISolone (MEDROL, JHONNY,) 4 MG tablet As directed 1 kit 0    tiZANidine (ZANAFLEX) 4 MG tablet Take 1 tablet by mouth at bedtime 10 tablet 0    triamterene-hydroCHLOROthiazide (MAXZIDE-25) 37.5-25 MG per tablet Take 0.5 tablets by mouth daily 30 tablet 3    diclofenac (VOLTAREN) 75 MG EC tablet Take 1 tablet by mouth daily      aspirin EC 81 MG EC tablet Take 1 tablet by mouth daily 90 tablet 1    Multiple Vitamins-Minerals (MULTIVITAMIN ADULTS PO) Take by mouth daily       No current facility-administered medications for this visit. Review of Systems -     General ROS: negative  Psychological ROS: negative  Hematological and Lymphatic ROS: No history of blood clots or bleeding disorder.    Respiratory ROS: no cough,  or wheezing, the rest see HPI  Cardiovascular ROS: See HPI  Gastrointestinal ROS: negative  Genito-Urinary ROS: no dysuria, trouble voiding, or hematuria  Musculoskeletal ROS: negative  Neurological ROS: no TIA or stroke symptoms  Dermatological ROS: negative      Blood pressure (!) 143/71, pulse 99, height 5' 3\" (1.6 m), weight 256 lb (116.1 kg), not currently breastfeeding. Physical Examination:    General appearance - alert, well appearing, and in no distress  HEENT- Pink conjunctiva  , Non-icteri sclera,PERRLA  Mental status - alert, oriented to person, place, and time  Neck - supple, no significant adenopathy, no JVD, or carotid bruits  Chest - clear to auscultation, no wheezes, rales or rhonchi, symmetric air entry  Heart - normal rate, regular rhythm, normal S1, S2, no murmurs, rubs, clicks or gallops  Abdomen - soft, nontender, nondistended, no masses or organomegaly  DEEDEE- no CVA or flank tenderness, no suprapubic tenderness  Neurological - alert, oriented, normal speech, no focal findings or movement disorder noted  Musculoskeletal/limbs - no joint tenderness, deformity or swelling   - peripheral pulses normal, no pedal edema, no clubbing or cyanosis  Skin - normal coloration and turgor, no rashes, no suspicious skin lesions noted  Psych- appropriate mood and affect    Lab  No results for input(s): CKTOTAL, CKMB, CKMBINDEX, TROPONINI in the last 72 hours.   CBC:   Lab Results   Component Value Date/Time    WBC 6.5 08/26/2022 01:30 PM    RBC 4.59 08/26/2022 01:30 PM    HGB 13.0 11/05/2022 11:41 AM    HCT 40.7 11/05/2022 11:41 AM    MCV 85.2 08/26/2022 01:30 PM    MCH 27.5 08/26/2022 01:30 PM    MCHC 32.2 08/26/2022 01:30 PM     08/26/2022 01:30 PM    MPV 10.7 08/26/2022 01:30 PM     BMP:    Lab Results   Component Value Date/Time     02/27/2023 11:06 AM    K 4.2 02/27/2023 11:06 AM     02/27/2023 11:06 AM    CO2 24 02/27/2023 11:06 AM    BUN 22 02/27/2023 11:06 AM    LABALBU 4.3 01/05/2022 02:17 PM CREATININE 0.9 02/27/2023 11:06 AM    CALCIUM 9.4 02/27/2023 11:06 AM    LABGLOM >60 02/27/2023 11:06 AM    GLUCOSE 149 02/27/2023 11:06 AM     Hepatic Function Panel:    Lab Results   Component Value Date/Time    ALKPHOS 96 01/05/2022 02:17 PM    ALT 53 11/05/2022 11:41 AM    AST 26 01/05/2022 02:17 PM    PROT 7.7 01/05/2022 02:17 PM    BILITOT 1.1 01/05/2022 02:17 PM    LABALBU 4.3 01/05/2022 02:17 PM     Magnesium:    Lab Results   Component Value Date/Time    MG 1.9 02/27/2023 11:06 AM     Warfarin PT/INR:  No components found for: PTPATWAR, PTINRWAR  HgBA1c:    Lab Results   Component Value Date/Time    LABA1C 6.2 06/01/2022 10:28 AM     FLP:    Lab Results   Component Value Date/Time    TRIG 77 08/26/2022 01:30 PM    HDL 56 08/26/2022 01:30 PM    LDLCALC 121 08/26/2022 01:30 PM     TSH:    Lab Results   Component Value Date/Time    TSH 1.810 01/16/2021 11:35 AM        Conclusions      Summary   Normal left ventricle size and systolic function. Ejection fraction was   estimated at 55 %. There were no regional left ventricular wall motion   abnormalities and wall thickness was within normal limits. Doppler parameters were consistent with abnormal left ventricular   relaxation (grade 1 diastolic dysfunction). Signature      ----------------------------------------------------------------   Electronically signed by Karyle Deforest MD (Interpreting   physician) on 08/12/2022 at 10:23 PM   ----------------------------------------------------------------          Conclusions      Summary   The nuclear images is suggestive for mILD myocardial ischemia IN tHE   ANTEROAPICAL WALL. Attenuation artifact related abnormality can not be excluded with   certainty. Recommendation   Clinical correlation is recommended.       Signatures      ----------------------------------------------------------------   Electronically signed by Karyle Deforest MD (Interpreting   Cardiologist) on 08/12/2022 at 19:06    EKG 01/05/22  Nsr, OLD INFERIOR I=FARCT, NO ACUTE ABN      Assessment   Diagnosis Orders   1. Bilateral leg edema +1  NOW AND NONE  Basic Metabolic Panel    Magnesium      2. SOB (shortness of breath) on exertion  Basic Metabolic Panel    Magnesium      3. Dizziness on standing  Basic Metabolic Panel    Magnesium      4. Morbid obesity due to excess calories (HCC)  Basic Metabolic Panel    Magnesium      5. Abnormal EKG  Basic Metabolic Panel    Magnesium      6. S/P cardiac cath-8/26/22-LM-P, LAD MID 30%, LCX-P, RCA PROX 35% ECCENTRIC PLAQUE, EDP 21 MMHG, EF 50 TO 55%- MED RX  Basic Metabolic Panel    Magnesium                  Plan     THE  CURRENT MEDS AND LABS REVIEWED    Continue the current treatment and with constant vigilance to changes in symptoms and also any potential side effects. Return for care or seek medical attention immediately if symptoms got worse and/or develop new symptoms. SOB ON EXERTION  ABN STRESS AND ABN EKG- cath nonosbtructive  The PFT- WNL  Elevated edp 21 mmhg on cath and started maxzide  Orthopnea of 2 to 3 pillow for long time better  Cont  ASA 81  Trace leg edema RT and +1 left- RESOLVED   Occasional dizziness- BETTER  Hydration BETTER NOW 2 BOTTLE   CONT  maxzide 25  1/2 tab po qd and see if sob get better and leg edema    Leg edema and sob better with maxzide    BMP and MG in NEXT VISIT    D/w the pat at length plan of care    OVERALL BETTER WITH LEG EDEMA AND SOME WITH SOB    Discussed use, benefit, and side effects of prescribed medications. All patient questions answered. Pt voiced understanding. Instructed to continue current medications, diet and exercise. Continue risk factor modification and medical management. Patient agreed with treatment plan. Follow up as directed.          RTC IN  6 month withy bmp and mg    Jennifer Blount, [facility]

## 2023-07-07 LAB
CHOLEST SERPL-MCNC: 208 MG/DL (ref 0–199)
FASTING: YES
GLUCOSE SERPL-MCNC: 141 MG/DL (ref 74–109)
HBA1C MFR BLD HPLC: 6.3 % (ref 4.4–6.4)
HDLC SERPL-MCNC: 51 MG/DL (ref 40–90)
LDLC SERPL CALC-MCNC: 133 MG/DL
TRIGL SERPL-MCNC: 121 MG/DL (ref 0–199)

## 2023-07-21 RX ORDER — TRIAMTERENE AND HYDROCHLOROTHIAZIDE 37.5; 25 MG/1; MG/1
0.5 TABLET ORAL DAILY
Qty: 30 TABLET | Refills: 2 | Status: SHIPPED | OUTPATIENT
Start: 2023-07-21

## 2023-08-09 ENCOUNTER — NURSE ONLY (OUTPATIENT)
Dept: LAB | Age: 63
End: 2023-08-09

## 2023-08-09 ENCOUNTER — HOSPITAL ENCOUNTER (EMERGENCY)
Age: 63
Discharge: HOME | End: 2023-08-09
Payer: COMMERCIAL

## 2023-08-09 VITALS
TEMPERATURE: 98.1 F | HEART RATE: 83 BPM | BODY MASS INDEX: 46.3 KG/M2 | RESPIRATION RATE: 16 BRPM | DIASTOLIC BLOOD PRESSURE: 94 MMHG | SYSTOLIC BLOOD PRESSURE: 153 MMHG | OXYGEN SATURATION: 97 % | WEIGHT: 261.4 LBS

## 2023-08-09 DIAGNOSIS — R60.0 BILATERAL LEG EDEMA: ICD-10-CM

## 2023-08-09 DIAGNOSIS — N39.0 URINARY TRACT INFECTION IN FEMALE: Primary | ICD-10-CM

## 2023-08-09 DIAGNOSIS — Z98.890 S/P CARDIAC CATH: ICD-10-CM

## 2023-08-09 DIAGNOSIS — R42 DIZZINESS ON STANDING: ICD-10-CM

## 2023-08-09 DIAGNOSIS — R06.02 SOB (SHORTNESS OF BREATH) ON EXERTION: ICD-10-CM

## 2023-08-09 DIAGNOSIS — J20.8 ACUTE VIRAL BRONCHITIS: ICD-10-CM

## 2023-08-09 DIAGNOSIS — E66.01 MORBID OBESITY DUE TO EXCESS CALORIES (HCC): ICD-10-CM

## 2023-08-09 DIAGNOSIS — R94.31 ABNORMAL EKG: ICD-10-CM

## 2023-08-09 LAB
ALT SERPL W/O P-5'-P-CCNC: 52 U/L (ref 11–66)
ANION GAP SERPL CALC-SCNC: 14 MEQ/L (ref 8–16)
BILIRUB UR STRIP.AUTO-MCNC: NEGATIVE MG/DL
BUN SERPL-MCNC: 21 MG/DL (ref 7–22)
CALCIUM SERPL-MCNC: 9.3 MG/DL (ref 8.5–10.5)
CHARACTER UR: CLEAR
CHLORIDE SERPL-SCNC: 101 MEQ/L (ref 98–111)
CO2 SERPL-SCNC: 23 MEQ/L (ref 23–33)
COLOR: YELLOW
CREAT SERPL-MCNC: 0.8 MG/DL (ref 0.4–1.2)
GFR SERPL CREATININE-BSD FRML MDRD: > 60 ML/MIN/1.73M2
GLUCOSE SERPL-MCNC: 96 MG/DL (ref 70–108)
GLUCOSE UR QL STRIP.AUTO: NEGATIVE MG/DL
HCT VFR BLD AUTO: 37.8 % (ref 37–47)
HGB BLD-MCNC: 11.9 GM/DL (ref 12–16)
KETONES UR QL STRIP.AUTO: NEGATIVE
MAGNESIUM SERPL-MCNC: 1.8 MG/DL (ref 1.6–2.4)
NITRITE UR QL STRIP.AUTO: NEGATIVE
PH UR STRIP.AUTO: 5.5 [PH] (ref 5–9)
POTASSIUM SERPL-SCNC: 3.8 MEQ/L (ref 3.5–5.2)
PROT UR STRIP.AUTO-MCNC: NEGATIVE MG/DL
RBC #/AREA URNS HPF: NEGATIVE /[HPF]
SODIUM SERPL-SCNC: 138 MEQ/L (ref 135–145)
SP GR UR STRIP.AUTO: 1.01 (ref 1–1.03)
UROBILINOGEN, URINE: 0.2 EU/DL (ref 0.2–1)
WBC #/AREA URNS HPF: ABNORMAL /[HPF]

## 2023-08-09 PROCEDURE — 99213 OFFICE O/P EST LOW 20 MIN: CPT | Performed by: NURSE PRACTITIONER

## 2023-08-09 PROCEDURE — 87086 URINE CULTURE/COLONY COUNT: CPT

## 2023-08-09 PROCEDURE — 99213 OFFICE O/P EST LOW 20 MIN: CPT

## 2023-08-09 PROCEDURE — 81003 URINALYSIS AUTO W/O SCOPE: CPT

## 2023-08-09 RX ORDER — DEXTROMETHORPHAN HYDROBROMIDE AND PROMETHAZINE HYDROCHLORIDE 15; 6.25 MG/5ML; MG/5ML
5 SYRUP ORAL 4 TIMES DAILY PRN
Qty: 120 ML | Refills: 0 | Status: SHIPPED | OUTPATIENT
Start: 2023-08-09 | End: 2023-08-16

## 2023-08-09 RX ORDER — PREDNISONE 20 MG/1
TABLET ORAL
Qty: 10 TABLET | Refills: 0 | Status: SHIPPED | OUTPATIENT
Start: 2023-08-09 | End: 2023-08-16

## 2023-08-09 RX ORDER — CEFDINIR 300 MG/1
300 CAPSULE ORAL 2 TIMES DAILY
Qty: 14 CAPSULE | Refills: 0 | Status: SHIPPED | OUTPATIENT
Start: 2023-08-09 | End: 2023-08-16

## 2023-08-09 ASSESSMENT — PAIN - FUNCTIONAL ASSESSMENT: PAIN_FUNCTIONAL_ASSESSMENT: NONE - DENIES PAIN

## 2023-08-09 NOTE — ED TRIAGE NOTES
Winston Mail arrives to room with complaint of  chest congestion, cough for past 2 weeks     Dark urine with urine frequency for past 3 weeks

## 2023-08-10 ENCOUNTER — TELEPHONE (OUTPATIENT)
Dept: FAMILY MEDICINE CLINIC | Age: 63
End: 2023-08-10

## 2023-08-10 LAB
BACTERIA UR CULT: ABNORMAL
ORGANISM: ABNORMAL

## 2023-08-10 ASSESSMENT — ENCOUNTER SYMPTOMS
BACK PAIN: 0
SORE THROAT: 0
NAUSEA: 0
CHEST TIGHTNESS: 0
EYE REDNESS: 0
TROUBLE SWALLOWING: 0
RHINORRHEA: 0
ABDOMINAL PAIN: 0
SHORTNESS OF BREATH: 0
COUGH: 1
DIARRHEA: 0
WHEEZING: 0
VOMITING: 0
EYE DISCHARGE: 0

## 2023-08-19 ENCOUNTER — HOSPITAL ENCOUNTER (EMERGENCY)
Age: 63
Discharge: HOME | End: 2023-08-19
Payer: COMMERCIAL

## 2023-08-19 VITALS
HEART RATE: 98 BPM | DIASTOLIC BLOOD PRESSURE: 85 MMHG | WEIGHT: 261 LBS | OXYGEN SATURATION: 98 % | TEMPERATURE: 98.4 F | BODY MASS INDEX: 46.23 KG/M2 | RESPIRATION RATE: 14 BRPM | SYSTOLIC BLOOD PRESSURE: 145 MMHG

## 2023-08-19 DIAGNOSIS — L20.9 ATOPIC DERMATITIS, UNSPECIFIED TYPE: Primary | ICD-10-CM

## 2023-08-19 PROCEDURE — 99213 OFFICE O/P EST LOW 20 MIN: CPT | Performed by: NURSE PRACTITIONER

## 2023-08-19 PROCEDURE — 6360000002 HC RX W HCPCS: Performed by: NURSE PRACTITIONER

## 2023-08-19 PROCEDURE — 99213 OFFICE O/P EST LOW 20 MIN: CPT

## 2023-08-19 PROCEDURE — 96372 THER/PROPH/DIAG INJ SC/IM: CPT

## 2023-08-19 RX ORDER — PREDNISONE 20 MG/1
TABLET ORAL
Qty: 15 TABLET | Refills: 0 | Status: SHIPPED | OUTPATIENT
Start: 2023-08-19 | End: 2023-08-29

## 2023-08-19 RX ORDER — SKIN CLEANSER COMB NO.41
CLEANSER (ML) TOPICAL
Refills: 0 | COMMUNITY
Start: 2023-08-19

## 2023-08-19 RX ORDER — METHYLPREDNISOLONE ACETATE 80 MG/ML
80 INJECTION, SUSPENSION INTRA-ARTICULAR; INTRALESIONAL; INTRAMUSCULAR; SOFT TISSUE ONCE
Status: COMPLETED | OUTPATIENT
Start: 2023-08-19 | End: 2023-08-19

## 2023-08-19 RX ADMIN — METHYLPREDNISOLONE ACETATE 80 MG: 80 INJECTION, SUSPENSION INTRA-ARTICULAR; INTRALESIONAL; INTRAMUSCULAR; SOFT TISSUE at 16:30

## 2023-08-19 ASSESSMENT — ENCOUNTER SYMPTOMS
DIARRHEA: 0
SORE THROAT: 0
STRIDOR: 0
WHEEZING: 0
NAUSEA: 0
APNEA: 0
SHORTNESS OF BREATH: 0
THROAT SWELLING: 0
HOARSE VOICE: 0
ABDOMINAL PAIN: 0
CHEST TIGHTNESS: 0
COUGH: 0
VOMITING: 0
PERI-ORBITAL EDEMA: 0
CHOKING: 0

## 2023-08-19 ASSESSMENT — PAIN SCALES - GENERAL: PAINLEVEL_OUTOF10: 2

## 2023-08-19 ASSESSMENT — PAIN - FUNCTIONAL ASSESSMENT: PAIN_FUNCTIONAL_ASSESSMENT: 0-10

## 2023-08-19 ASSESSMENT — PAIN DESCRIPTION - LOCATION: LOCATION: FACE

## 2023-08-19 NOTE — ED TRIAGE NOTES
Laurie Dennis arrives to room with complaint of  rash and facial swelling around eyes, rash between fingers  symptoms started 1 days ago.    Pulled weeds Thveronica

## 2023-08-21 ENCOUNTER — TELEPHONE (OUTPATIENT)
Dept: FAMILY MEDICINE CLINIC | Age: 63
End: 2023-08-21

## 2023-09-26 ENCOUNTER — OFFICE VISIT (OUTPATIENT)
Dept: CARDIOLOGY CLINIC | Age: 63
End: 2023-09-26
Payer: COMMERCIAL

## 2023-09-26 VITALS
SYSTOLIC BLOOD PRESSURE: 123 MMHG | WEIGHT: 260 LBS | HEART RATE: 90 BPM | BODY MASS INDEX: 44.39 KG/M2 | HEIGHT: 64 IN | DIASTOLIC BLOOD PRESSURE: 76 MMHG

## 2023-09-26 DIAGNOSIS — R07.89 CHEST PAIN, ATYPICAL: ICD-10-CM

## 2023-09-26 DIAGNOSIS — Z98.890 S/P CARDIAC CATH: ICD-10-CM

## 2023-09-26 DIAGNOSIS — E66.01 MORBID OBESITY DUE TO EXCESS CALORIES (HCC): ICD-10-CM

## 2023-09-26 DIAGNOSIS — R06.02 SOB (SHORTNESS OF BREATH) ON EXERTION: Primary | ICD-10-CM

## 2023-09-26 DIAGNOSIS — R42 DIZZINESS ON STANDING: ICD-10-CM

## 2023-09-26 DIAGNOSIS — R60.0 BILATERAL LEG EDEMA: ICD-10-CM

## 2023-09-26 DIAGNOSIS — R94.31 ABNORMAL EKG: ICD-10-CM

## 2023-09-26 PROCEDURE — 99214 OFFICE O/P EST MOD 30 MIN: CPT | Performed by: INTERNAL MEDICINE

## 2023-09-26 PROCEDURE — 93000 ELECTROCARDIOGRAM COMPLETE: CPT | Performed by: INTERNAL MEDICINE

## 2023-09-26 NOTE — PROGRESS NOTES
Karen Referred for sob on exertion and abn stress tets  Abnormal stress test f/u.          6 month follow up. EKG done today. No wt gain    Hx of Leg edema none but  now resolved only puffiness  AFTER MAXZIDE THE LEG EDEMA BETTER AND SOB BETTER    Sob on exertion  chronic and stable  Sob on exertion progressively worse over 2 yrs- worse in the last couple of months  Gained more wt in the last 1 yr after foot surgery    Chronic stable Occasional dizziness on standing- BETTER AFTER HYDRATION- 2 BOTTLE OF WATER     NO MORE CHEST PAIN  Denied palpitation, chest pain,    hX OF Occasional atypical cp for a while FOR WHICH HAD CATH AND NONOBSTRUCTIVE   Hx of atypical last few seconds    Nonsmoker    Fhx    fATHER   HAD cva/tia, cabg IN HIS 70'S      Past Surgical History:   Procedure Laterality Date    ACHILLES TENDON SURGERY Left 1/12/2022    EXCISION OF POSTERIOR CALCANEAL HEEL SPUR LEFT HEEL, REATTACHMENT OF ACHILLES TENDON ON LEFT WITH SPIDER PLATE AND SCREW performed by Zak Roa DPM at [address] [facility]      rt foot    TUBAL LIGATION         Allergies   Allergen Reactions    Sulfa Antibiotics Swelling     Feet swelled        Family History   Problem Relation Age of Onset    Arthritis Mother     Stroke Mother     Diabetes Father     Stroke Father     High Blood Pressure Father     Breast Cancer Sister     Amyotrophic lateral sclerosis Brother         Social History     Socioeconomic History    Marital status:       Spouse name: Not on file    Number of children: 4    Years of education: 12    Highest education level: High school graduate   Occupational History    Not on file   Tobacco Use    Smoking status: Never    Smokeless tobacco: Never   Vaping Use    Vaping Use: Never used   Substance and Sexual Activity    Alcohol use: Not Currently     Comment: occasionally    Drug use: No    Sexual activity: Not on file   Other Topics Concern

## 2023-10-03 ENCOUNTER — COMMUNITY OUTREACH (OUTPATIENT)
Dept: FAMILY MEDICINE CLINIC | Age: 63
End: 2023-10-03

## 2023-10-29 ENCOUNTER — HOSPITAL ENCOUNTER (EMERGENCY)
Age: 63
Discharge: HOME | End: 2023-10-29
Payer: COMMERCIAL

## 2023-10-29 VITALS
WEIGHT: 258 LBS | SYSTOLIC BLOOD PRESSURE: 147 MMHG | TEMPERATURE: 98.1 F | DIASTOLIC BLOOD PRESSURE: 97 MMHG | OXYGEN SATURATION: 94 % | HEART RATE: 111 BPM | RESPIRATION RATE: 17 BRPM | HEIGHT: 64 IN | BODY MASS INDEX: 44.05 KG/M2

## 2023-10-29 DIAGNOSIS — J20.9 ACUTE BRONCHITIS, UNSPECIFIED ORGANISM: Primary | ICD-10-CM

## 2023-10-29 LAB
BILIRUB UR STRIP.AUTO-MCNC: ABNORMAL MG/DL
CHARACTER UR: CLEAR
COLOR: YELLOW
GLUCOSE UR QL STRIP.AUTO: NEGATIVE MG/DL
KETONES UR QL STRIP.AUTO: ABNORMAL
NITRITE UR QL STRIP.AUTO: NEGATIVE
PH UR STRIP.AUTO: 5.5 [PH] (ref 5–9)
PROT UR STRIP.AUTO-MCNC: 100 MG/DL
RBC #/AREA URNS HPF: ABNORMAL /[HPF]
SP GR UR STRIP.AUTO: >= 1.03 (ref 1–1.03)
UROBILINOGEN, URINE: 2 EU/DL (ref 0.2–1)
WBC #/AREA URNS HPF: NEGATIVE /[HPF]

## 2023-10-29 PROCEDURE — 99213 OFFICE O/P EST LOW 20 MIN: CPT

## 2023-10-29 PROCEDURE — 99213 OFFICE O/P EST LOW 20 MIN: CPT | Performed by: NURSE PRACTITIONER

## 2023-10-29 PROCEDURE — 81003 URINALYSIS AUTO W/O SCOPE: CPT

## 2023-10-29 RX ORDER — PREDNISONE 20 MG/1
20 TABLET ORAL DAILY
Qty: 5 TABLET | Refills: 0 | Status: SHIPPED | OUTPATIENT
Start: 2023-10-29 | End: 2023-11-03

## 2023-10-29 ASSESSMENT — ENCOUNTER SYMPTOMS
EYE PAIN: 0
BLOOD IN STOOL: 0
NAUSEA: 0
COUGH: 1
DIARRHEA: 0
RHINORRHEA: 0
SHORTNESS OF BREATH: 0
WHEEZING: 0
CONSTIPATION: 0
SORE THROAT: 1
ABDOMINAL PAIN: 0
VOMITING: 0

## 2023-10-29 NOTE — ED PROVIDER NOTES
[address] [employer]       Chief Complaint   Patient presents with    Cough    Otalgia     Right    Wheezing    Urinary Frequency        Nurses Notes reviewed and I agree except as noted in the HPI. HISTORY OF PRESENT ILLNESS   Angela Spears is a 58 y.o. female who presents to urgent care with complaint of cough, sore throat, right ear pain, wheezing and pelvic pain is been ongoing for about 6 days. Patient states she is taken over-the-counter medications. She denies other symptoms including chest pain, shortness of breath, dysuria, urgency, frequency, hematuria, vaginal discharge, CVA tenderness or known fever. She states that the pelvic pain started when she was coughing very hard. REVIEW OF SYSTEMS     Review of Systems   Constitutional:  Negative for appetite change, chills, fatigue, fever and unexpected weight change. HENT:  Positive for congestion and sore throat. Negative for ear pain and rhinorrhea. Eyes:  Negative for pain and visual disturbance. Respiratory:  Positive for cough. Negative for shortness of breath and wheezing. Cardiovascular:  Negative for chest pain, palpitations and leg swelling. Gastrointestinal:  Negative for abdominal pain, blood in stool, constipation, diarrhea, nausea and vomiting. Genitourinary:  Positive for pelvic pain. Negative for decreased urine volume, difficulty urinating, dyspareunia, dysuria, enuresis, flank pain, frequency, genital sores, hematuria, menstrual problem, urgency, vaginal bleeding, vaginal discharge and vaginal pain. Musculoskeletal:  Negative for arthralgias, joint swelling and neck stiffness. Skin:  Negative for rash. Neurological:  Negative for dizziness, syncope, weakness, light-headedness and headaches. Hematological:  Does not bruise/bleed easily.        PAST MEDICAL HISTORY         Diagnosis Date    COPD (chronic obstructive pulmonary disease) ([facility])     GERD

## 2023-10-29 NOTE — DISCHARGE INSTRUCTIONS
19 Go to ER for worsening symptoms, inability to keep liquids down, inability to urinate for greater than 8 hours or difficulty breathing. Follow-up with your primary care provider. Wear cotton underwear and loose fitting garments. Increase oral fluid intake. medications/rest

## 2023-10-29 NOTE — ED NOTES
Patient comes in with cough, right ear pain, sore throat and urinary frequency. Patient states she has had symptoms since Monday.  Patient has audible expiratory wheezing, hx of COPD     Dorothea Porter RN  10/29/23 8896

## 2023-12-29 ENCOUNTER — OFFICE VISIT (OUTPATIENT)
Dept: FAMILY MEDICINE CLINIC | Age: 63
End: 2023-12-29
Payer: COMMERCIAL

## 2023-12-29 VITALS
WEIGHT: 261.1 LBS | RESPIRATION RATE: 20 BRPM | BODY MASS INDEX: 44.82 KG/M2 | DIASTOLIC BLOOD PRESSURE: 76 MMHG | HEART RATE: 100 BPM | SYSTOLIC BLOOD PRESSURE: 126 MMHG

## 2023-12-29 DIAGNOSIS — J32.9 SINOBRONCHITIS: Primary | ICD-10-CM

## 2023-12-29 DIAGNOSIS — R20.2 NUMBNESS AND TINGLING OF BOTH FEET: ICD-10-CM

## 2023-12-29 DIAGNOSIS — J44.9 CHRONIC OBSTRUCTIVE PULMONARY DISEASE, UNSPECIFIED COPD TYPE (HCC): ICD-10-CM

## 2023-12-29 DIAGNOSIS — M54.10 RADICULITIS OF LEG: ICD-10-CM

## 2023-12-29 DIAGNOSIS — R20.0 NUMBNESS AND TINGLING OF BOTH FEET: ICD-10-CM

## 2023-12-29 DIAGNOSIS — J40 SINOBRONCHITIS: Primary | ICD-10-CM

## 2023-12-29 DIAGNOSIS — M51.36 NARROWING OF LUMBAR INTERVERTEBRAL DISC SPACE: ICD-10-CM

## 2023-12-29 DIAGNOSIS — R29.898 LEG WEAKNESS, BILATERAL: ICD-10-CM

## 2023-12-29 DIAGNOSIS — E66.01 OBESITY, MORBID, BMI 40.0-49.9 (HCC): ICD-10-CM

## 2023-12-29 PROCEDURE — 99214 OFFICE O/P EST MOD 30 MIN: CPT | Performed by: NURSE PRACTITIONER

## 2023-12-29 RX ORDER — PREDNISONE 50 MG/1
50 TABLET ORAL DAILY
Qty: 5 TABLET | Refills: 0 | Status: SHIPPED | OUTPATIENT
Start: 2023-12-29 | End: 2024-01-03

## 2023-12-29 RX ORDER — AMOXICILLIN AND CLAVULANATE POTASSIUM 875; 125 MG/1; MG/1
1 TABLET, FILM COATED ORAL 2 TIMES DAILY
Qty: 20 TABLET | Refills: 0 | Status: SHIPPED | OUTPATIENT
Start: 2023-12-29 | End: 2024-01-08

## 2023-12-29 NOTE — PROGRESS NOTES
Shahida Tyler (1960) 58 y.o. female here for evaluation of the following chief complaint(s):      HPI:  Chief Complaint   Patient presents with    Numbness     Numbness bilateral feet    Leg Pain     Pt having bilateral leg cramps, more so the right lower leg. When pt stands up \"my right leg does not support me\", pt has to hang onto something    Sinus Problem     Sinus issues the past 4months, was seen at [facility] and still no better     Sinus and chest congestion. Going back and forth. Ongoing x 4 months. Head congestion. Cough that is productive. Seen in  on OCtober and placed on Prednisone 20 mg Daily x 5 days. No benefit. Taking Mucinex currently with benefit. Numbness and tingling in feet equally. No position or movement worsens or improves. Right leg numbness and tingling. Right leg weakness when placing weight on it. Will get bilateral leg weakness when standing or walking for long periods of time. Cramping bilateral legs. Denies back pain. XR LUMBAR 2018: IMPRESSION:  1. There are 6 lumbar type vertebra. 2. There is a leaning of the thoracolumbar spine to the left, possibly positional.  3. Moderate disc space narrowing is present at the L3-4 and L4-5 levels and appears to be fusion at the L5-6 level and questionable fusion of the L6-S1 level. Mild degenerative spondylosis seen anteriorly and posteriorly at the L3-4 level and is moderate   spondylosis anteriorly and posteriorly at the L6-S1 level. 4. No fractures seen. Mild degenerative changes right sacroiliac joint.     Hemoglobin A1C   Date Value Ref Range Status   07/07/2023 6.3 4.4 - 6.4 % Final     Comment:     Performed at [address], [address] [facility]       Vitals:    12/29/23 0850   BP: 126/76   Pulse: 100   Resp: 20       Patient Active Problem List   Diagnosis    GERD (gastroesophageal reflux disease)    Adjustment disorder with depressed mood    MISHA (obstructive sleep apnea)

## 2024-01-12 ENCOUNTER — NURSE ONLY (OUTPATIENT)
Dept: LAB | Age: 64
End: 2024-01-12

## 2024-01-12 DIAGNOSIS — R20.0 NUMBNESS AND TINGLING OF BOTH FEET: ICD-10-CM

## 2024-01-12 DIAGNOSIS — R20.2 NUMBNESS AND TINGLING OF BOTH FEET: ICD-10-CM

## 2024-01-12 LAB
DEPRECATED MEAN GLUCOSE BLD GHB EST-ACNC: 141 MG/DL (ref 70–126)
HBA1C MFR BLD HPLC: 6.7 % (ref 4.4–6.4)

## 2024-01-29 RX ORDER — TRIAMTERENE AND HYDROCHLOROTHIAZIDE 37.5; 25 MG/1; MG/1
0.5 TABLET ORAL DAILY
Qty: 45 TABLET | Refills: 0 | Status: SHIPPED | OUTPATIENT
Start: 2024-01-29

## 2024-02-16 ENCOUNTER — OFFICE VISIT (OUTPATIENT)
Dept: FAMILY MEDICINE CLINIC | Age: 64
End: 2024-02-16
Payer: COMMERCIAL

## 2024-02-16 VITALS
DIASTOLIC BLOOD PRESSURE: 80 MMHG | SYSTOLIC BLOOD PRESSURE: 134 MMHG | RESPIRATION RATE: 16 BRPM | WEIGHT: 262.8 LBS | BODY MASS INDEX: 45.11 KG/M2 | HEART RATE: 112 BPM

## 2024-02-16 DIAGNOSIS — R20.2 NUMBNESS AND TINGLING OF BOTH FEET: Primary | ICD-10-CM

## 2024-02-16 DIAGNOSIS — R29.898 LEG WEAKNESS, BILATERAL: ICD-10-CM

## 2024-02-16 DIAGNOSIS — R20.0 NUMBNESS AND TINGLING OF BOTH FEET: Primary | ICD-10-CM

## 2024-02-16 DIAGNOSIS — F33.0 MILD EPISODE OF RECURRENT MAJOR DEPRESSIVE DISORDER (HCC): ICD-10-CM

## 2024-02-16 DIAGNOSIS — E11.9 TYPE 2 DIABETES MELLITUS WITHOUT COMPLICATION, WITHOUT LONG-TERM CURRENT USE OF INSULIN (HCC): ICD-10-CM

## 2024-02-16 DIAGNOSIS — M54.10 RADICULITIS OF LEG: ICD-10-CM

## 2024-02-16 DIAGNOSIS — J44.9 CHRONIC OBSTRUCTIVE PULMONARY DISEASE, UNSPECIFIED COPD TYPE (HCC): ICD-10-CM

## 2024-02-16 PROCEDURE — 1123F ACP DISCUSS/DSCN MKR DOCD: CPT | Performed by: NURSE PRACTITIONER

## 2024-02-16 PROCEDURE — 3044F HG A1C LEVEL LT 7.0%: CPT | Performed by: NURSE PRACTITIONER

## 2024-02-16 PROCEDURE — 99214 OFFICE O/P EST MOD 30 MIN: CPT | Performed by: NURSE PRACTITIONER

## 2024-02-16 RX ORDER — DULOXETIN HYDROCHLORIDE 30 MG/1
30 CAPSULE, DELAYED RELEASE ORAL DAILY
Qty: 30 CAPSULE | Refills: 1 | Status: SHIPPED | OUTPATIENT
Start: 2024-02-16

## 2024-02-16 NOTE — PROGRESS NOTES
So LUND Boni (1960) 64 y.o. female here for evaluation of the following chief complaint(s):      HPI:  Chief Complaint   Patient presents with    Leg Swelling     Pt c/o bilateral leg/foot pain, swelling and numbness. Affects her walking    Depression     Pt feeling depressed because of the issues with her legs       Numbness and tingling in feet equally.  Pain.  No position or movement worsens or improves.   Right leg numbness and tingling.  Right leg weakness when placing weight on it.   Will get bilateral leg weakness when standing or walking for long periods of time.  Cramping bilateral legs.   Denies back pain.   Was sen 1 month ago and given XR Lumbar order with plans to get MRI.  Did not complete    XR LUMBAR 2018:  IMPRESSION:  1. There are 6 lumbar type vertebra.  2. There is a leaning of the thoracolumbar spine to the left, possibly positional.  3. Moderate disc space narrowing is present at the L3-4 and L4-5 levels and appears to be fusion at the L5-6 level and questionable fusion of the L6-S1 level. Mild degenerative spondylosis seen anteriorly and posteriorly at the L3-4 level and is moderate   spondylosis anteriorly and posteriorly at the L6-S1 level.   4. No fractures seen. Mild degenerative changes right sacroiliac joint.    Hemoglobin A1C   Date Value Ref Range Status   01/12/2024 6.7 (H) 4.4 - 6.4 % Final       Vitals:    02/16/24 1112   BP: 134/80   Pulse: (!) 112   Resp: 16       Patient Active Problem List   Diagnosis    GERD (gastroesophageal reflux disease)    Adjustment disorder with depressed mood    MISHA (obstructive sleep apnea)    hx of Chest pain, atypical- noncardiac     Abnormal EKG    SOB (shortness of breath) on exertion    Morbid obesity due to excess calories (HCC)    S/P cardiac cath-8/26/22-LM-P, LAD MID 30%, LCX-P, RCA PROX 35% ECCENTRIC PLAQUE, EDP 21 MMHG, EF 50 TO 55%- MED RX    Dizziness on standing    Fluid overload, unspecified    Bilateral leg edema +1  NOW AND NONE

## 2024-02-16 NOTE — PROGRESS NOTES
So LUND Boni (1960) 64 y.o. female here for evaluation of the following chief complaint(s):      HPI:  Chief Complaint   Patient presents with    Leg Swelling     Pt c/o bilateral leg/foot pain, swelling and numbness. Affects her walking    Depression     Pt feeling depressed because of the issues with her legs       Numbness and tingling in feet equally.  No position or movement worsens or improves.   Right leg numbness and tingling.  Right leg weakness when placing weight on it.   Will get bilateral leg weakness when standing or walking for long periods of time.  Cramping bilateral legs.   Denies back pain.     XR LUMBAR 2018:  IMPRESSION:  1. There are 6 lumbar type vertebra.  2. There is a leaning of the thoracolumbar spine to the left, possibly positional.  3. Moderate disc space narrowing is present at the L3-4 and L4-5 levels and appears to be fusion at the L5-6 level and questionable fusion of the L6-S1 level. Mild degenerative spondylosis seen anteriorly and posteriorly at the L3-4 level and is moderate   spondylosis anteriorly and posteriorly at the L6-S1 level.   4. No fractures seen. Mild degenerative changes right sacroiliac joint.    Hemoglobin A1C   Date Value Ref Range Status   01/12/2024 6.7 (H) 4.4 - 6.4 % Final       Vitals:    02/16/24 1112   BP: 134/80   Pulse: (!) 112   Resp: 16       Patient Active Problem List   Diagnosis    GERD (gastroesophageal reflux disease)    Adjustment disorder with depressed mood    MISHA (obstructive sleep apnea)    hx of Chest pain, atypical- noncardiac     Abnormal EKG    SOB (shortness of breath) on exertion    Morbid obesity due to excess calories (HCC)    S/P cardiac cath-8/26/22-LM-P, LAD MID 30%, LCX-P, RCA PROX 35% ECCENTRIC PLAQUE, EDP 21 MMHG, EF 50 TO 55%- MED RX    Dizziness on standing    Fluid overload, unspecified    Bilateral leg edema +1  NOW AND NONE    Chronic obstructive pulmonary disease, unspecified       SUBJECTIVE/OBJECTIVE:  Review of

## 2024-02-20 ENCOUNTER — TELEPHONE (OUTPATIENT)
Dept: FAMILY MEDICINE CLINIC | Age: 64
End: 2024-02-20

## 2024-02-20 DIAGNOSIS — M15.9 PRIMARY OSTEOARTHRITIS INVOLVING MULTIPLE JOINTS: Primary | ICD-10-CM

## 2024-02-20 NOTE — TELEPHONE ENCOUNTER
Spoke with pt and Dr. Avalos diagnosed her with Osteoarthritis. Pt aware Dr. Shay office will contact her to schedule. Please advise.    See 2/5/2020 Media Tab.

## 2024-02-20 NOTE — TELEPHONE ENCOUNTER
The patient called in and stated that she has been seeing Dr. Avalos and she received a call from her office and was told that they do not take the [facility] insurance UMR.  She is requesting a referral to Dr. Armendariz's.  She is aware that Dr. Armendariz's office will call her to schedule.

## 2024-03-12 DIAGNOSIS — F33.0 MILD EPISODE OF RECURRENT MAJOR DEPRESSIVE DISORDER (HCC): ICD-10-CM

## 2024-03-12 DIAGNOSIS — M54.10 RADICULITIS OF LEG: ICD-10-CM

## 2024-03-12 RX ORDER — DULOXETIN HYDROCHLORIDE 30 MG/1
30 CAPSULE, DELAYED RELEASE ORAL DAILY
Qty: 90 CAPSULE | Refills: 3 | Status: SHIPPED | OUTPATIENT
Start: 2024-03-12

## 2024-03-12 NOTE — TELEPHONE ENCOUNTER
Pt needs their triamterene-hydrochlorothiazide 37.5-25 mg refilled and sent to the [facility] in a 90 day supply.

## 2024-03-12 NOTE — TELEPHONE ENCOUNTER
Patient calling and requesting refill of Cymbalta 30mg to [facility].  Please refill if appropriate

## 2024-03-15 RX ORDER — TRIAMTERENE AND HYDROCHLOROTHIAZIDE 37.5; 25 MG/1; MG/1
0.5 TABLET ORAL DAILY
Qty: 45 TABLET | Refills: 0 | Status: SHIPPED | OUTPATIENT
Start: 2024-03-15

## 2024-03-26 ENCOUNTER — OFFICE VISIT (OUTPATIENT)
Dept: CARDIOLOGY CLINIC | Age: 64
End: 2024-03-26
Payer: COMMERCIAL

## 2024-03-26 ENCOUNTER — HOSPITAL ENCOUNTER (OUTPATIENT)
Age: 64
Discharge: HOME | End: 2024-03-26
Payer: COMMERCIAL

## 2024-03-26 ENCOUNTER — HOSPITAL ENCOUNTER (OUTPATIENT)
Dept: GENERAL RADIOLOGY | Age: 64
Discharge: HOME | End: 2024-03-26
Payer: COMMERCIAL

## 2024-03-26 VITALS
BODY MASS INDEX: 44.39 KG/M2 | HEIGHT: 64 IN | DIASTOLIC BLOOD PRESSURE: 88 MMHG | HEART RATE: 108 BPM | WEIGHT: 260 LBS | SYSTOLIC BLOOD PRESSURE: 147 MMHG

## 2024-03-26 DIAGNOSIS — Z98.890 S/P CARDIAC CATH: ICD-10-CM

## 2024-03-26 DIAGNOSIS — R00.2 INTERMITTENT PALPITATIONS: ICD-10-CM

## 2024-03-26 DIAGNOSIS — R20.0 NUMBNESS AND TINGLING OF BOTH FEET: ICD-10-CM

## 2024-03-26 DIAGNOSIS — R60.0 BILATERAL LEG EDEMA: ICD-10-CM

## 2024-03-26 DIAGNOSIS — R94.31 ABNORMAL EKG: ICD-10-CM

## 2024-03-26 DIAGNOSIS — R20.2 NUMBNESS AND TINGLING OF BOTH FEET: ICD-10-CM

## 2024-03-26 DIAGNOSIS — R29.898 LEG WEAKNESS, BILATERAL: ICD-10-CM

## 2024-03-26 DIAGNOSIS — R00.0 SINUS TACHYCARDIA: ICD-10-CM

## 2024-03-26 DIAGNOSIS — R06.02 SOB (SHORTNESS OF BREATH) ON EXERTION: Primary | ICD-10-CM

## 2024-03-26 DIAGNOSIS — M51.36 NARROWING OF LUMBAR INTERVERTEBRAL DISC SPACE: ICD-10-CM

## 2024-03-26 DIAGNOSIS — M54.10 RADICULITIS OF LEG: ICD-10-CM

## 2024-03-26 DIAGNOSIS — R42 DIZZINESS ON STANDING: ICD-10-CM

## 2024-03-26 DIAGNOSIS — E66.01 MORBID OBESITY DUE TO EXCESS CALORIES (HCC): ICD-10-CM

## 2024-03-26 PROCEDURE — 72100 X-RAY EXAM L-S SPINE 2/3 VWS: CPT

## 2024-03-26 PROCEDURE — 99214 OFFICE O/P EST MOD 30 MIN: CPT | Performed by: INTERNAL MEDICINE

## 2024-03-26 PROCEDURE — 1123F ACP DISCUSS/DSCN MKR DOCD: CPT | Performed by: INTERNAL MEDICINE

## 2024-03-26 RX ORDER — METOPROLOL SUCCINATE 25 MG/1
25 TABLET, EXTENDED RELEASE ORAL DAILY
Qty: 90 TABLET | Refills: 2 | Status: SHIPPED | OUTPATIENT
Start: 2024-03-26

## 2024-03-26 RX ORDER — TRIAMTERENE AND HYDROCHLOROTHIAZIDE 37.5; 25 MG/1; MG/1
0.5 TABLET ORAL DAILY
Qty: 45 TABLET | Refills: 3 | Status: SHIPPED | OUTPATIENT
Start: 2024-03-26

## 2024-03-26 NOTE — PROGRESS NOTES
Attenuation artifact related abnormality can not be excluded with   certainty.      Recommendation   Clinical correlation is recommended.      Signatures      ----------------------------------------------------------------   Electronically signed by Mary Townsend MD (Interpreting   Cardiologist) on 08/12/2022 at 19:06    EKG 01/05/22  Nsr, OLD INFERIOR I=FARCT, NO ACUTE ABN    Ekg 9/26/23  Sinus  Rhythm   Low voltage in precordial leads.    -RSR(V1) -nondiagnostic.   ABNORMAL       Assessment     Diagnosis Orders   1. SOB (shortness of breath) on exertion  TSH with Reflex      2. Bilateral leg edema +1  NOW AND NONE  TSH with Reflex      3. Dizziness on standing  TSH with Reflex      4. Sinus tachycardia  TSH with Reflex      5. Intermittent palpitations  TSH with Reflex      6. Morbid obesity due to excess calories (HCC)  TSH with Reflex      7. S/P cardiac cath-8/26/22-LM-P, LAD MID 30%, LCX-P, RCA PROX 35% ECCENTRIC PLAQUE, EDP 21 MMHG, EF 50 TO 55%- MED RX  TSH with Reflex      8. Abnormal EKG  TSH with Reflex                    Plan     THE most  CURRENT MEDS AND LABS REVIEWED    Continue the current treatment and with constant vigilance to changes in symptoms and also any potential side effects.  Return for care or seek medical attention immediately if symptoms got worse and/or develop new symptoms.    Palpitation intermittent   Sinus tachycardia    SOB ON EXERTION  ABN STRESS AND ABN EKG- cath nonosbtructive  The PFT- WNL  Elevated edp 21 mmhg on cath and started maxzide  Orthopnea of 2 to 3 pillow for long time better  Cont  ASA 81  Trace leg edema RT and +1 left- RESOLVED   Occasional dizziness- BETTER  Hydration BETTER NOW 2 BOTTLE   CONT  maxzide 25  1/2 tab po qd  as the  sob get better and leg edema    Palpitation  Sinus tachycardia on exertion  Start toprol xl 25 mg po qd  TSH wnl 2021  TSH    Leg edema and sob better with maxzide    BMP and MG in NEXT VISIT    Ortho dizziness  Re- advised better

## 2024-03-27 DIAGNOSIS — R20.0 NUMBNESS AND TINGLING OF BOTH FEET: ICD-10-CM

## 2024-03-27 DIAGNOSIS — R29.898 LEG WEAKNESS, BILATERAL: ICD-10-CM

## 2024-03-27 DIAGNOSIS — R20.2 NUMBNESS AND TINGLING OF BOTH FEET: ICD-10-CM

## 2024-03-27 DIAGNOSIS — M48.061 LUMBAR FORAMINAL STENOSIS: Primary | ICD-10-CM

## 2024-03-27 DIAGNOSIS — M54.10 RADICULITIS OF LEG: ICD-10-CM

## 2024-04-10 ENCOUNTER — PROCEDURE VISIT (OUTPATIENT)
Dept: NEUROLOGY | Age: 64
End: 2024-04-10
Payer: COMMERCIAL

## 2024-04-10 DIAGNOSIS — M54.16 LUMBAR RADICULOPATHY: Primary | ICD-10-CM

## 2024-04-10 DIAGNOSIS — R20.0 BILATERAL LEG NUMBNESS: ICD-10-CM

## 2024-04-10 DIAGNOSIS — M79.605 BILATERAL LEG PAIN: ICD-10-CM

## 2024-04-10 DIAGNOSIS — M79.604 BILATERAL LEG PAIN: ICD-10-CM

## 2024-04-10 PROCEDURE — 95910 NRV CNDJ TEST 7-8 STUDIES: CPT | Performed by: PSYCHIATRY & NEUROLOGY

## 2024-04-10 PROCEDURE — 95886 MUSC TEST DONE W/N TEST COMP: CPT | Performed by: PSYCHIATRY & NEUROLOGY

## 2024-04-17 ENCOUNTER — HOSPITAL ENCOUNTER (OUTPATIENT)
Dept: MRI IMAGING | Age: 64
Discharge: HOME | End: 2024-04-17
Payer: COMMERCIAL

## 2024-04-17 DIAGNOSIS — R20.2 NUMBNESS AND TINGLING OF BOTH FEET: ICD-10-CM

## 2024-04-17 DIAGNOSIS — R29.898 LEG WEAKNESS, BILATERAL: ICD-10-CM

## 2024-04-17 DIAGNOSIS — R20.0 NUMBNESS AND TINGLING OF BOTH FEET: ICD-10-CM

## 2024-04-17 DIAGNOSIS — M54.10 RADICULITIS OF LEG: ICD-10-CM

## 2024-04-17 DIAGNOSIS — M48.061 LUMBAR FORAMINAL STENOSIS: ICD-10-CM

## 2024-04-17 PROCEDURE — 72148 MRI LUMBAR SPINE W/O DYE: CPT

## 2024-04-18 DIAGNOSIS — R20.0 NUMBNESS AND TINGLING OF BOTH FEET: Primary | ICD-10-CM

## 2024-04-18 DIAGNOSIS — R20.2 NUMBNESS AND TINGLING OF BOTH FEET: Primary | ICD-10-CM

## 2024-04-18 DIAGNOSIS — M54.16 LEFT LUMBAR RADICULITIS: ICD-10-CM

## 2024-04-18 DIAGNOSIS — M48.061 LUMBAR FORAMINAL STENOSIS: ICD-10-CM

## 2024-05-17 ENCOUNTER — OFFICE VISIT (OUTPATIENT)
Dept: FAMILY MEDICINE CLINIC | Age: 64
End: 2024-05-17

## 2024-05-17 VITALS
TEMPERATURE: 98 F | SYSTOLIC BLOOD PRESSURE: 112 MMHG | RESPIRATION RATE: 16 BRPM | BODY MASS INDEX: 41.8 KG/M2 | OXYGEN SATURATION: 95 % | HEART RATE: 80 BPM | WEIGHT: 251.2 LBS | DIASTOLIC BLOOD PRESSURE: 70 MMHG

## 2024-05-17 DIAGNOSIS — J44.9 CHRONIC OBSTRUCTIVE PULMONARY DISEASE, UNSPECIFIED COPD TYPE (HCC): ICD-10-CM

## 2024-05-17 DIAGNOSIS — E66.01 OBESITY, MORBID, BMI 40.0-49.9 (HCC): ICD-10-CM

## 2024-05-17 DIAGNOSIS — E11.9 TYPE 2 DIABETES MELLITUS WITHOUT COMPLICATION, WITHOUT LONG-TERM CURRENT USE OF INSULIN (HCC): ICD-10-CM

## 2024-05-17 DIAGNOSIS — J44.1 COPD WITH ACUTE EXACERBATION (HCC): Primary | ICD-10-CM

## 2024-05-17 RX ORDER — PREDNISONE 50 MG/1
50 TABLET ORAL DAILY
Qty: 5 TABLET | Refills: 0 | Status: SHIPPED | OUTPATIENT
Start: 2024-05-17 | End: 2024-05-22

## 2024-05-17 RX ORDER — AZITHROMYCIN 250 MG/1
TABLET, FILM COATED ORAL
Qty: 6 TABLET | Refills: 0 | Status: SHIPPED | OUTPATIENT
Start: 2024-05-17 | End: 2024-05-27

## 2024-05-17 SDOH — ECONOMIC STABILITY: FOOD INSECURITY: WITHIN THE PAST 12 MONTHS, YOU WORRIED THAT YOUR FOOD WOULD RUN OUT BEFORE YOU GOT MONEY TO BUY MORE.: NEVER TRUE

## 2024-05-17 SDOH — ECONOMIC STABILITY: INCOME INSECURITY: HOW HARD IS IT FOR YOU TO PAY FOR THE VERY BASICS LIKE FOOD, HOUSING, MEDICAL CARE, AND HEATING?: NOT HARD AT ALL

## 2024-05-17 SDOH — ECONOMIC STABILITY: FOOD INSECURITY: WITHIN THE PAST 12 MONTHS, THE FOOD YOU BOUGHT JUST DIDN'T LAST AND YOU DIDN'T HAVE MONEY TO GET MORE.: NEVER TRUE

## 2024-05-17 ASSESSMENT — ENCOUNTER SYMPTOMS
CHEST TIGHTNESS: 1
ABDOMINAL PAIN: 0
SINUS PRESSURE: 0
NAUSEA: 0
SHORTNESS OF BREATH: 1
RHINORRHEA: 1
COUGH: 1

## 2024-05-17 NOTE — PROGRESS NOTES
So Plata (1960) 64 y.o. female here for evaluation of the following chief complaint(s):      HPI:  Chief Complaint   Patient presents with    Cough     X 1 week.  Taking OTC sinus medication, Robitussin DM and Mucinex without improvement    Chest Congestion    Shortness of Breath    Wheezing       Onset of 1 week with symptoms as above.  Symptom to come on after walking through garden that was blooming.  Started out in sinus and seem to settle in chest.     Cough controlled with OTC.     On Symbicort 80-4.5 mg BID.  COPD.     Vitals:    05/17/24 0913   BP: 112/70   Pulse: 80   Resp: 16   Temp: 98 °F (36.7 °C)   SpO2: 95%       Patient Active Problem List   Diagnosis    GERD (gastroesophageal reflux disease)    Adjustment disorder with depressed mood    MISHA (obstructive sleep apnea)    hx of Chest pain, atypical- noncardiac     Abnormal EKG    SOB (shortness of breath) on exertion    Morbid obesity due to excess calories (HCC)    S/P cardiac cath-8/26/22-LM-P, LAD MID 30%, LCX-P, RCA PROX 35% ECCENTRIC PLAQUE, EDP 21 MMHG, EF 50 TO 55%- MED RX    Dizziness on standing    Fluid overload, unspecified    Bilateral leg edema +1  NOW AND NONE    Chronic obstructive pulmonary disease, unspecified    Sinus tachycardia    Intermittent palpitations       SUBJECTIVE/OBJECTIVE:  Review of Systems   Constitutional:  Positive for fatigue. Negative for chills and fever.   HENT:  Positive for congestion, postnasal drip and rhinorrhea. Negative for sinus pressure.    Respiratory:  Positive for cough, chest tightness, shortness of breath and wheezing.    Cardiovascular:  Negative for chest pain.   Gastrointestinal:  Negative for abdominal pain and nausea.   Skin:  Negative for rash.   Neurological:  Negative for dizziness, light-headedness and headaches.   Psychiatric/Behavioral: Negative.         Physical Exam  Constitutional:       General: She is not in acute distress.  HENT:      Nose: Mucosal edema, congestion and

## 2024-05-29 ENCOUNTER — OFFICE VISIT (OUTPATIENT)
Dept: FAMILY MEDICINE CLINIC | Age: 64
End: 2024-05-29
Payer: COMMERCIAL

## 2024-05-29 VITALS
HEART RATE: 94 BPM | SYSTOLIC BLOOD PRESSURE: 114 MMHG | WEIGHT: 253.3 LBS | DIASTOLIC BLOOD PRESSURE: 68 MMHG | OXYGEN SATURATION: 95 % | BODY MASS INDEX: 42.2 KG/M2 | HEIGHT: 65 IN | RESPIRATION RATE: 18 BRPM

## 2024-05-29 DIAGNOSIS — B37.0 THRUSH OF MOUTH AND ESOPHAGUS (HCC): Primary | ICD-10-CM

## 2024-05-29 DIAGNOSIS — B37.81 THRUSH OF MOUTH AND ESOPHAGUS (HCC): Primary | ICD-10-CM

## 2024-05-29 DIAGNOSIS — J44.9 CHRONIC OBSTRUCTIVE PULMONARY DISEASE, UNSPECIFIED COPD TYPE (HCC): ICD-10-CM

## 2024-05-29 DIAGNOSIS — E11.9 TYPE 2 DIABETES MELLITUS WITHOUT COMPLICATION, WITHOUT LONG-TERM CURRENT USE OF INSULIN (HCC): ICD-10-CM

## 2024-05-29 PROCEDURE — G2211 COMPLEX E/M VISIT ADD ON: HCPCS | Performed by: NURSE PRACTITIONER

## 2024-05-29 PROCEDURE — 3044F HG A1C LEVEL LT 7.0%: CPT | Performed by: NURSE PRACTITIONER

## 2024-05-29 PROCEDURE — 99213 OFFICE O/P EST LOW 20 MIN: CPT | Performed by: NURSE PRACTITIONER

## 2024-05-29 PROCEDURE — 1123F ACP DISCUSS/DSCN MKR DOCD: CPT | Performed by: NURSE PRACTITIONER

## 2024-05-29 NOTE — PROGRESS NOTES
So LUND Boni (1960) 64 y.o. female here for evaluation of the following chief complaint(s):      HPI:  Chief Complaint   Patient presents with    Shortness of Breath     Still have bronchitis   Tried the Zithromax and believes it cause thrush        Was seen in office 5/17/24 dx with Bronchitis.  At that time was dealing with symptoms x 1 week.  Placed on Prednisone and ZPAK. Symptoms of dough, congestion, SOB and wheezing have improved.  Occasional wheezing but overall better    Last 4-5 days has had sore throat, burning sensation in tongue and throat.  Noticed white patches on tongue.  Swelling in back of throat making it difficult to breath at times.     On Symbicort 80-4.5 mg BID for COPD.     Vitals:    05/29/24 1325   BP: 114/68   Pulse: 94   Resp: 18   SpO2: 95%       Patient Active Problem List   Diagnosis    GERD (gastroesophageal reflux disease)    Adjustment disorder with depressed mood    MISHA (obstructive sleep apnea)    hx of Chest pain, atypical- noncardiac     Abnormal EKG    SOB (shortness of breath) on exertion    Morbid obesity due to excess calories (HCC)    S/P cardiac cath-8/26/22-LM-P, LAD MID 30%, LCX-P, RCA PROX 35% ECCENTRIC PLAQUE, EDP 21 MMHG, EF 50 TO 55%- MED RX    Dizziness on standing    Fluid overload, unspecified    Bilateral leg edema +1  NOW AND NONE    Chronic obstructive pulmonary disease, unspecified    Sinus tachycardia    Intermittent palpitations       SUBJECTIVE/OBJECTIVE:  Review of Systems   Constitutional:  Negative for chills, fatigue and fever.   HENT:  Positive for congestion, postnasal drip, rhinorrhea, sore throat and trouble swallowing.    Respiratory:  Positive for cough. Negative for shortness of breath.    Cardiovascular:  Negative for chest pain.   Gastrointestinal:  Negative for abdominal pain and nausea.   Skin:  Negative for rash.   Neurological:  Negative for dizziness, light-headedness and headaches.   Psychiatric/Behavioral: Negative.

## 2024-05-30 ASSESSMENT — ENCOUNTER SYMPTOMS
SHORTNESS OF BREATH: 0
SORE THROAT: 1
RHINORRHEA: 1
COUGH: 1
ABDOMINAL PAIN: 0
NAUSEA: 0
TROUBLE SWALLOWING: 1

## 2024-07-19 ENCOUNTER — OFFICE VISIT (OUTPATIENT)
Dept: FAMILY MEDICINE CLINIC | Age: 64
End: 2024-07-19
Payer: COMMERCIAL

## 2024-07-19 VITALS
WEIGHT: 251.7 LBS | SYSTOLIC BLOOD PRESSURE: 124 MMHG | BODY MASS INDEX: 41.89 KG/M2 | HEART RATE: 80 BPM | RESPIRATION RATE: 22 BRPM | DIASTOLIC BLOOD PRESSURE: 86 MMHG

## 2024-07-19 DIAGNOSIS — R68.89 FLU-LIKE SYMPTOMS: ICD-10-CM

## 2024-07-19 DIAGNOSIS — J32.9 RHINOSINUSITIS: ICD-10-CM

## 2024-07-19 DIAGNOSIS — B34.9 VIRAL SYNDROME: Primary | ICD-10-CM

## 2024-07-19 PROCEDURE — 99213 OFFICE O/P EST LOW 20 MIN: CPT | Performed by: NURSE PRACTITIONER

## 2024-07-19 PROCEDURE — G2211 COMPLEX E/M VISIT ADD ON: HCPCS | Performed by: NURSE PRACTITIONER

## 2024-07-19 PROCEDURE — 1123F ACP DISCUSS/DSCN MKR DOCD: CPT | Performed by: NURSE PRACTITIONER

## 2024-07-19 RX ORDER — PREDNISONE 50 MG/1
50 TABLET ORAL DAILY
Qty: 5 TABLET | Refills: 0 | Status: SHIPPED | OUTPATIENT
Start: 2024-07-19 | End: 2024-07-24

## 2024-07-19 ASSESSMENT — ENCOUNTER SYMPTOMS
RHINORRHEA: 1
COUGH: 0
NAUSEA: 0
SORE THROAT: 1
ABDOMINAL PAIN: 0
SHORTNESS OF BREATH: 0
SINUS PRESSURE: 1

## 2024-07-19 NOTE — PROGRESS NOTES
So Plata (1960) 64 y.o. female here for evaluation of the following chief complaint(s):      HPI:  Chief Complaint   Patient presents with    Fever     Started Sunday    Pharyngitis    Otalgia     bilateral    Generalized Body Aches    Nausea    Cough    Head Congestion    Headache       Onset of 6 days with symptoms as above.  Grandchildren were sick and she was babysitting them    Feel better today.   Sinus congestion and pressure biggest complaints.     Denies CP, SOB or chest tightness.  UNderlying COPD.  ON Symbicort.         Vitals:    07/19/24 1107   BP: 124/86   Pulse: 80   Resp: 22       Patient Active Problem List   Diagnosis    GERD (gastroesophageal reflux disease)    Adjustment disorder with depressed mood    MISHA (obstructive sleep apnea)    hx of Chest pain, atypical- noncardiac     Abnormal EKG    SOB (shortness of breath) on exertion    Morbid obesity due to excess calories (HCC)    S/P cardiac cath-8/26/22-LM-P, LAD MID 30%, LCX-P, RCA PROX 35% ECCENTRIC PLAQUE, EDP 21 MMHG, EF 50 TO 55%- MED RX    Dizziness on standing    Fluid overload, unspecified    Bilateral leg edema +1  NOW AND NONE    Chronic obstructive pulmonary disease, unspecified    Sinus tachycardia    Intermittent palpitations       SUBJECTIVE/OBJECTIVE:  Review of Systems   Constitutional:  Positive for fatigue. Negative for chills and fever.   HENT:  Positive for congestion, postnasal drip, rhinorrhea, sinus pressure and sore throat.    Respiratory:  Negative for cough and shortness of breath.    Cardiovascular:  Negative for chest pain.   Gastrointestinal:  Negative for abdominal pain and nausea.   Skin:  Negative for rash.   Neurological:  Positive for headaches. Negative for dizziness and light-headedness.   Psychiatric/Behavioral: Negative.         Physical Exam  Constitutional:       General: She is not in acute distress.  HENT:      Nose: Mucosal edema, congestion and rhinorrhea present.      Mouth/Throat:

## 2024-07-26 ENCOUNTER — OFFICE VISIT (OUTPATIENT)
Dept: FAMILY MEDICINE CLINIC | Age: 64
End: 2024-07-26
Payer: COMMERCIAL

## 2024-07-26 VITALS
WEIGHT: 255.8 LBS | HEART RATE: 100 BPM | DIASTOLIC BLOOD PRESSURE: 82 MMHG | SYSTOLIC BLOOD PRESSURE: 136 MMHG | BODY MASS INDEX: 42.57 KG/M2 | RESPIRATION RATE: 16 BRPM

## 2024-07-26 DIAGNOSIS — J32.9 RHINOSINUSITIS: Primary | ICD-10-CM

## 2024-07-26 PROCEDURE — 99213 OFFICE O/P EST LOW 20 MIN: CPT | Performed by: NURSE PRACTITIONER

## 2024-07-26 PROCEDURE — 1123F ACP DISCUSS/DSCN MKR DOCD: CPT | Performed by: NURSE PRACTITIONER

## 2024-07-26 PROCEDURE — G2211 COMPLEX E/M VISIT ADD ON: HCPCS | Performed by: NURSE PRACTITIONER

## 2024-07-26 RX ORDER — AMOXICILLIN AND CLAVULANATE POTASSIUM 875; 125 MG/1; MG/1
1 TABLET, FILM COATED ORAL 2 TIMES DAILY
Qty: 14 TABLET | Refills: 0 | Status: SHIPPED | OUTPATIENT
Start: 2024-07-26 | End: 2024-08-02

## 2024-07-26 ASSESSMENT — ENCOUNTER SYMPTOMS
CHEST TIGHTNESS: 0
ABDOMINAL PAIN: 0
SINUS PAIN: 0
COUGH: 0
NAUSEA: 0
TROUBLE SWALLOWING: 0
SINUS PRESSURE: 1
RHINORRHEA: 1
SHORTNESS OF BREATH: 0

## 2024-07-26 NOTE — PROGRESS NOTES
dizziness and headaches. Negative for light-headedness.   Psychiatric/Behavioral: Negative.         Physical Exam  HENT:      Head: Normocephalic.      Right Ear: A middle ear effusion is present.      Left Ear: A middle ear effusion is present.      Nose: Mucosal edema, congestion and rhinorrhea present.      Right Turbinates: Swollen.      Left Turbinates: Swollen.      Right Sinus: No maxillary sinus tenderness.      Left Sinus: No maxillary sinus tenderness.      Mouth/Throat:      Pharynx: Pharyngeal swelling present. No oropharyngeal exudate or posterior oropharyngeal erythema.   Eyes:      Pupils: Pupils are equal, round, and reactive to light.   Neck:      Vascular: No carotid bruit.   Cardiovascular:      Rate and Rhythm: Normal rate and regular rhythm.      Pulses: Normal pulses.      Heart sounds: Normal heart sounds.   Pulmonary:      Effort: Pulmonary effort is normal.      Breath sounds: Normal breath sounds.   Abdominal:      General: Bowel sounds are normal.      Palpations: Abdomen is soft.   Musculoskeletal:         General: Normal range of motion.      Cervical back: Normal range of motion and neck supple.   Skin:     General: Skin is warm and dry.   Neurological:      Mental Status: She is alert and oriented to person, place, and time.           ASSESSMENT/PLAN:   Diagnosis Orders   1. Rhinosinusitis  amoxicillin-clavulanate (AUGMENTIN) 875-125 MG per tablet            MDM:  Orders as above   Fluids and rest  RTO if symptoms worsen or stay the same        An electronic signature was used to authenticate this note.    --Edward Sawyer, APRN - CNP

## 2024-07-31 LAB
CHOLEST SERPL-MCNC: 183 MG/DL (ref 0–199)
FASTING: YES
GLUCOSE SERPL-MCNC: 132 MG/DL (ref 74–109)
HBA1C MFR BLD HPLC: 6.5 % (ref 4.4–6.4)
HDLC SERPL-MCNC: 47 MG/DL (ref 40–90)
LDLC SERPL CALC-MCNC: 108 MG/DL
TRIGL SERPL-MCNC: 138 MG/DL (ref 0–199)

## 2024-09-11 DIAGNOSIS — M54.10 RADICULITIS OF LEG: ICD-10-CM

## 2024-09-11 DIAGNOSIS — F33.0 MILD EPISODE OF RECURRENT MAJOR DEPRESSIVE DISORDER (HCC): ICD-10-CM

## 2024-09-11 RX ORDER — DULOXETIN HYDROCHLORIDE 30 MG/1
30 CAPSULE, DELAYED RELEASE ORAL DAILY
Qty: 90 CAPSULE | Refills: 3 | Status: SHIPPED | OUTPATIENT
Start: 2024-09-11

## 2024-09-16 ENCOUNTER — LAB (OUTPATIENT)
Dept: LAB | Age: 64
End: 2024-09-16

## 2024-09-16 DIAGNOSIS — R60.0 BILATERAL LEG EDEMA: ICD-10-CM

## 2024-09-16 DIAGNOSIS — R94.31 ABNORMAL EKG: ICD-10-CM

## 2024-09-16 DIAGNOSIS — R00.2 INTERMITTENT PALPITATIONS: ICD-10-CM

## 2024-09-16 DIAGNOSIS — R00.0 SINUS TACHYCARDIA: ICD-10-CM

## 2024-09-16 DIAGNOSIS — R06.02 SOB (SHORTNESS OF BREATH) ON EXERTION: ICD-10-CM

## 2024-09-16 DIAGNOSIS — R42 DIZZINESS ON STANDING: ICD-10-CM

## 2024-09-16 DIAGNOSIS — Z98.890 S/P CARDIAC CATH: ICD-10-CM

## 2024-09-16 DIAGNOSIS — E66.01 MORBID OBESITY DUE TO EXCESS CALORIES (HCC): ICD-10-CM

## 2024-09-16 LAB — TSH SERPL DL<=0.005 MIU/L-ACNC: 1.46 UIU/ML (ref 0.4–4.2)

## 2024-09-17 ENCOUNTER — OFFICE VISIT (OUTPATIENT)
Dept: CARDIOLOGY CLINIC | Age: 64
End: 2024-09-17
Payer: COMMERCIAL

## 2024-09-17 VITALS
HEART RATE: 114 BPM | WEIGHT: 249.2 LBS | BODY MASS INDEX: 42.55 KG/M2 | HEIGHT: 64 IN | SYSTOLIC BLOOD PRESSURE: 113 MMHG | DIASTOLIC BLOOD PRESSURE: 71 MMHG

## 2024-09-17 DIAGNOSIS — R00.2 INTERMITTENT PALPITATIONS: ICD-10-CM

## 2024-09-17 DIAGNOSIS — R00.0 SINUS TACHYCARDIA: ICD-10-CM

## 2024-09-17 DIAGNOSIS — R60.9 BODY FLUID RETENTION: ICD-10-CM

## 2024-09-17 DIAGNOSIS — R60.0 BILATERAL LEG EDEMA: ICD-10-CM

## 2024-09-17 DIAGNOSIS — R42 DIZZINESS ON STANDING: ICD-10-CM

## 2024-09-17 DIAGNOSIS — E66.01 MORBID OBESITY DUE TO EXCESS CALORIES (HCC): ICD-10-CM

## 2024-09-17 DIAGNOSIS — R06.02 SOB (SHORTNESS OF BREATH) ON EXERTION: Primary | ICD-10-CM

## 2024-09-17 PROCEDURE — 1123F ACP DISCUSS/DSCN MKR DOCD: CPT | Performed by: INTERNAL MEDICINE

## 2024-09-17 PROCEDURE — 93000 ELECTROCARDIOGRAM COMPLETE: CPT | Performed by: INTERNAL MEDICINE

## 2024-09-17 PROCEDURE — 99214 OFFICE O/P EST MOD 30 MIN: CPT | Performed by: INTERNAL MEDICINE

## 2024-09-17 RX ORDER — TRIAMTERENE/HYDROCHLOROTHIAZID 37.5-25 MG
0.5 TABLET ORAL DAILY
Qty: 45 TABLET | Refills: 1 | Status: SHIPPED | OUTPATIENT
Start: 2024-09-17

## 2024-09-17 RX ORDER — METOPROLOL SUCCINATE 25 MG/1
25 TABLET, EXTENDED RELEASE ORAL DAILY
Qty: 90 TABLET | Refills: 1 | Status: SHIPPED | OUTPATIENT
Start: 2024-09-17

## 2024-10-07 ENCOUNTER — COMMUNITY OUTREACH (OUTPATIENT)
Dept: FAMILY MEDICINE CLINIC | Age: 64
End: 2024-10-07

## 2024-11-06 ENCOUNTER — HOSPITAL ENCOUNTER (OUTPATIENT)
Dept: WOMENS IMAGING | Age: 64
Discharge: HOME | End: 2024-11-06
Payer: COMMERCIAL

## 2024-11-06 VITALS — WEIGHT: 249 LBS | HEIGHT: 64 IN | BODY MASS INDEX: 42.51 KG/M2

## 2024-11-06 DIAGNOSIS — Z12.31 VISIT FOR SCREENING MAMMOGRAM: ICD-10-CM

## 2024-11-06 PROCEDURE — 77063 BREAST TOMOSYNTHESIS BI: CPT

## 2024-12-04 ENCOUNTER — LAB (OUTPATIENT)
Dept: LAB | Age: 64
End: 2024-12-04

## 2024-12-04 LAB
ALBUMIN SERPL BCG-MCNC: 4.2 G/DL (ref 3.5–5.1)
ALP SERPL-CCNC: 98 U/L (ref 38–126)
ALT SERPL W/O P-5'-P-CCNC: 26 U/L (ref 11–66)
AMORPH SED URNS QL MICRO: ABNORMAL
ANION GAP SERPL CALC-SCNC: 19 MEQ/L (ref 8–16)
AST SERPL-CCNC: 24 U/L (ref 5–40)
BACTERIA URNS QL MICRO: ABNORMAL /HPF
BASOPHILS ABSOLUTE: 0.1 THOU/MM3 (ref 0–0.1)
BASOPHILS NFR BLD AUTO: 0.8 %
BILIRUB SERPL-MCNC: 0.7 MG/DL (ref 0.3–1.2)
BILIRUB UR QL STRIP.AUTO: NEGATIVE
BUN SERPL-MCNC: 16 MG/DL (ref 7–22)
CALCIUM SERPL-MCNC: 10 MG/DL (ref 8.5–10.5)
CASTS #/AREA URNS LPF: ABNORMAL /LPF
CASTS 2: ABNORMAL /LPF
CHARACTER UR: ABNORMAL
CHLORIDE SERPL-SCNC: 103 MEQ/L (ref 98–111)
CO2 SERPL-SCNC: 22 MEQ/L (ref 23–33)
COLOR, UA: YELLOW
CREAT SERPL-MCNC: 0.8 MG/DL (ref 0.4–1.2)
CRYSTALS URNS MICRO: ABNORMAL
DEPRECATED RDW RBC AUTO: 39.9 FL (ref 35–45)
EOSINOPHIL NFR BLD AUTO: 14.4 %
EOSINOPHILS ABSOLUTE: 1.4 THOU/MM3 (ref 0–0.4)
EPITHELIAL CELLS, UA: ABNORMAL /HPF
ERYTHROCYTE [DISTWIDTH] IN BLOOD BY AUTOMATED COUNT: 13 % (ref 11.5–14.5)
GFR SERPL CREATININE-BSD FRML MDRD: 81 ML/MIN/1.73M2
GLUCOSE SERPL-MCNC: 113 MG/DL (ref 70–108)
GLUCOSE UR QL STRIP.AUTO: NEGATIVE MG/DL
HCT VFR BLD AUTO: 40.1 % (ref 37–47)
HGB BLD-MCNC: 12.8 GM/DL (ref 12–16)
HGB UR QL STRIP.AUTO: ABNORMAL
IMM GRANULOCYTES # BLD AUTO: 0.02 THOU/MM3 (ref 0–0.07)
IMM GRANULOCYTES NFR BLD AUTO: 0.2 %
KETONES UR QL STRIP.AUTO: NEGATIVE
LYMPHOCYTES ABSOLUTE: 3.3 THOU/MM3 (ref 1–4.8)
LYMPHOCYTES NFR BLD AUTO: 33.2 %
MCH RBC QN AUTO: 27.2 PG (ref 26–33)
MCHC RBC AUTO-ENTMCNC: 31.9 GM/DL (ref 32.2–35.5)
MCV RBC AUTO: 85.1 FL (ref 81–99)
MISCELLANEOUS 2: ABNORMAL
MONOCYTES ABSOLUTE: 0.7 THOU/MM3 (ref 0.4–1.3)
MONOCYTES NFR BLD AUTO: 7.5 %
MUCOUS THREADS URNS QL MICRO: ABNORMAL
NEUTROPHILS ABSOLUTE: 4.3 THOU/MM3 (ref 1.8–7.7)
NEUTROPHILS NFR BLD AUTO: 43.9 %
NITRITE UR QL STRIP: POSITIVE
NRBC BLD AUTO-RTO: 0 /100 WBC
PH UR STRIP.AUTO: 5 [PH] (ref 5–9)
PLATELET # BLD AUTO: 298 THOU/MM3 (ref 130–400)
PMV BLD AUTO: 11.1 FL (ref 9.4–12.4)
POTASSIUM SERPL-SCNC: 4.4 MEQ/L (ref 3.5–5.2)
PROT SERPL-MCNC: 8 G/DL (ref 6.1–8)
PROT UR STRIP.AUTO-MCNC: NEGATIVE MG/DL
RBC # BLD AUTO: 4.71 MILL/MM3 (ref 4.2–5.4)
RBC URINE: ABNORMAL /HPF
RENAL EPI CELLS #/AREA URNS HPF: ABNORMAL /[HPF]
SODIUM SERPL-SCNC: 144 MEQ/L (ref 135–145)
SP GR UR REFRACT.AUTO: 1.02 (ref 1–1.03)
UROBILINOGEN, URINE: 0.2 EU/DL (ref 0–1)
WBC # BLD AUTO: 9.8 THOU/MM3 (ref 4.8–10.8)
WBC #/AREA URNS HPF: > 200 /HPF
WBC #/AREA URNS HPF: ABNORMAL /[HPF]
YEAST LIKE FUNGI URNS QL MICRO: ABNORMAL

## 2024-12-06 LAB
BACTERIA UR CULT: ABNORMAL
ORGANISM: ABNORMAL

## 2024-12-13 ENCOUNTER — OFFICE VISIT (OUTPATIENT)
Dept: FAMILY MEDICINE CLINIC | Age: 64
End: 2024-12-13
Payer: COMMERCIAL

## 2024-12-13 ENCOUNTER — TELEPHONE (OUTPATIENT)
Dept: CARDIOLOGY CLINIC | Age: 64
End: 2024-12-13

## 2024-12-13 ENCOUNTER — TELEPHONE (OUTPATIENT)
Dept: FAMILY MEDICINE CLINIC | Age: 64
End: 2024-12-13

## 2024-12-13 VITALS
HEART RATE: 80 BPM | SYSTOLIC BLOOD PRESSURE: 128 MMHG | WEIGHT: 251.3 LBS | RESPIRATION RATE: 20 BRPM | DIASTOLIC BLOOD PRESSURE: 70 MMHG | HEIGHT: 64 IN | BODY MASS INDEX: 42.9 KG/M2

## 2024-12-13 DIAGNOSIS — E66.01 OBESITY, MORBID, BMI 40.0-49.9: ICD-10-CM

## 2024-12-13 DIAGNOSIS — Z01.818 PRE-OP EVALUATION: Primary | ICD-10-CM

## 2024-12-13 DIAGNOSIS — J44.9 CHRONIC OBSTRUCTIVE PULMONARY DISEASE, UNSPECIFIED COPD TYPE (HCC): ICD-10-CM

## 2024-12-13 DIAGNOSIS — M17.11 PRIMARY OSTEOARTHRITIS OF RIGHT KNEE: ICD-10-CM

## 2024-12-13 DIAGNOSIS — E11.9 TYPE 2 DIABETES MELLITUS WITHOUT COMPLICATION, WITHOUT LONG-TERM CURRENT USE OF INSULIN (HCC): ICD-10-CM

## 2024-12-13 DIAGNOSIS — N30.00 ACUTE CYSTITIS WITHOUT HEMATURIA: ICD-10-CM

## 2024-12-13 DIAGNOSIS — F33.0 MILD EPISODE OF RECURRENT MAJOR DEPRESSIVE DISORDER (HCC): ICD-10-CM

## 2024-12-13 PROBLEM — M19.90 ARTHRITIS: Status: ACTIVE | Noted: 2020-02-01

## 2024-12-13 PROCEDURE — 3044F HG A1C LEVEL LT 7.0%: CPT | Performed by: FAMILY MEDICINE

## 2024-12-13 PROCEDURE — G2211 COMPLEX E/M VISIT ADD ON: HCPCS | Performed by: FAMILY MEDICINE

## 2024-12-13 PROCEDURE — 1123F ACP DISCUSS/DSCN MKR DOCD: CPT | Performed by: FAMILY MEDICINE

## 2024-12-13 PROCEDURE — 99214 OFFICE O/P EST MOD 30 MIN: CPT | Performed by: FAMILY MEDICINE

## 2024-12-13 RX ORDER — CIPROFLOXACIN 250 MG/1
250 TABLET, FILM COATED ORAL 2 TIMES DAILY
Qty: 6 TABLET | Refills: 0 | Status: SHIPPED | OUTPATIENT
Start: 2024-12-13 | End: 2024-12-16

## 2024-12-13 RX ORDER — GABAPENTIN 300 MG/1
CAPSULE ORAL
COMMUNITY
Start: 2024-12-09

## 2024-12-13 NOTE — TELEPHONE ENCOUNTER
Pre op Risk Assessment    Procedure Total Knee  Physician Kevin  Date of surgery/procedure 12/20/24    Last OV 9/17/24  Last Stress 8/12/22  Last Echo 8/12/22  Last Cath 8/26/22  Last Stent none in epic  Is patient on blood thinners ASA 81  Hold Meds/how many days ?    Please let patient know if clear  F. [phone]

## 2024-12-13 NOTE — PROGRESS NOTES
So Plata (:  1960) is a 64 y.o. female,Established patient, here for evaluation of the following chief complaint(s):  Pre-op Exam (Right total knee 24 with Dr. Hansen ) and Orders (Needs an order for a walker, no wheels )          Subjective   SUBJECTIVE/OBJECTIVE:  HPI  Chief Complaint   Patient presents with    Pre-op Exam     Right total knee 24 with Dr. Hansen     Orders     Needs an order for a walker, no wheels      Pre-op eval.  Scheduled for right total knee replacement with Dr. Brown on .    Pt denies CP, chest tightness, SOB.  Able to perform 4 METs with no cardiac symptoms.    Sugars well controlled.  Lab Results   Component Value Date    LABA1C 6.5 (H) 2024    LABA1C 6.7 (H) 2024    LABA1C 6.3 2023     Lab Results   Component Value Date    CREATININE 0.8 2024         BP Readings from Last 3 Encounters:   24 128/70   24 113/71   24 136/82     Wt Readings from Last 3 Encounters:   24 114 kg (251 lb 4.8 oz)   24 112.9 kg (249 lb)   24 113 kg (249 lb 3.2 oz)     Pt denies hx of general anesthesia complications.    Patient Active Problem List   Diagnosis    GERD (gastroesophageal reflux disease)    Adjustment disorder with depressed mood    MISHA (obstructive sleep apnea)    hx of Chest pain, atypical- noncardiac     Abnormal EKG    SOB (shortness of breath) on exertion    Morbid obesity due to excess calories    S/P cardiac cath-22-LM-P, LAD MID 30%, LCX-P, RCA PROX 35% ECCENTRIC PLAQUE, EDP 21 MMHG, EF 50 TO 55%- MED RX    Dizziness on standing    Fluid overload, unspecified    Bilateral leg edema +1  NOW AND NONE    Chronic obstructive pulmonary disease, unspecified    Sinus tachycardia    Intermittent palpitations    Body fluid retention    Arthritis       Current Outpatient Medications   Medication Sig Dispense Refill    gabapentin (NEURONTIN) 300 MG capsule       ciprofloxacin (CIPRO) 250 MG tablet Take 1

## 2024-12-16 ASSESSMENT — ENCOUNTER SYMPTOMS
RESPIRATORY NEGATIVE: 1
GASTROINTESTINAL NEGATIVE: 1

## 2024-12-16 NOTE — TELEPHONE ENCOUNTER
Called [facility] [phone] and requested records of EKG and chest xray.     They are faxing results.   
Noted.  
Requested information was received and scanned into Media dated 12/13/24.  
Strong peripheral pulses

## 2025-01-21 ENCOUNTER — HOSPITAL ENCOUNTER (OUTPATIENT)
Dept: PHYSICAL THERAPY | Age: 65
Setting detail: THERAPIES SERIES
Discharge: HOME | End: 2025-01-21
Payer: COMMERCIAL

## 2025-01-21 PROCEDURE — 97161 PT EVAL LOW COMPLEX 20 MIN: CPT

## 2025-01-21 PROCEDURE — 97110 THERAPEUTIC EXERCISES: CPT

## 2025-01-21 NOTE — PROGRESS NOTES
[facility]  PHYSICAL THERAPY  [x] EVALUATION  [] DAILY NOTE (LAND) [] DAILY NOTE (AQUATIC ) [] PROGRESS NOTE [] DISCHARGE NOTE    [x] OUTPATIENT REHABILITATION CENTER [facility]   [] [place] [facility]    [] [facility]   [] [facility]    Date: 2025  Patient Name:  So Plata  : 1960  MRN: [id number]  CSN: [id number]    Referring Practitioner Antonio Whalen PA-C [id number]      Diagnosis  Diagnoses       Z47.1 (ICD-10-CM) - Aftercare following joint replacement surgery    Z96.651 (ICD-10-CM) - Presence of right artificial knee joint           Treatment Diagnosis Z47.1 Aftercare following joint replacement surgery  M25.561  Right Knee Pain  M25.661 Stiffness of right knee, not elsewhere classified  R53.1 Weakness  R26.2 Difficulty in walking   Date of Evaluation 25   Additional Pertinent History So Plata has a past medical history of COPD (chronic obstructive pulmonary disease) (HCC), GERD (gastroesophageal reflux disease), Hypertension, Obesity, Restless legs syndrome, and Unspecified sleep apnea.  she has a past surgical history that includes Tubal ligation (); Foot surgery; Endometrial ablation; and Achilles tendon surgery (Left, 2022).     Allergies Allergies   Allergen Reactions    Sulfa Antibiotics Swelling     Feet swelled      Medications   Current Outpatient Medications:     gabapentin (NEURONTIN) 300 MG capsule, , Disp: , Rfl:     metoprolol succinate (TOPROL XL) 25 MG extended release tablet, Take 1 tablet by mouth daily, Disp: 90 tablet, Rfl: 1    triamterene-hydroCHLOROthiazide (MAXZIDE-25) 37.5-25 MG per tablet, Take 0.5 tablets by mouth daily, Disp: 45 tablet, Rfl: 1    DULoxetine (CYMBALTA) 30 MG extended release capsule, Take 1 capsule by mouth daily, Disp: 90 capsule, Rfl: 3    albuterol sulfate HFA (VENTOLIN HFA) 108 (90 Base) MCG/ACT inhaler, Inhale 2 puffs into the lungs 4 times daily as needed for Wheezing

## 2025-01-23 ENCOUNTER — HOSPITAL ENCOUNTER (OUTPATIENT)
Dept: PHYSICAL THERAPY | Age: 65
Setting detail: THERAPIES SERIES
Discharge: HOME | End: 2025-01-23
Payer: COMMERCIAL

## 2025-01-23 PROCEDURE — 97110 THERAPEUTIC EXERCISES: CPT

## 2025-01-23 NOTE — PROGRESS NOTES
GOALS:  Patient Goal: Return to working and living in her own home     Short Term Goals: Deferred to long term     Long Term Goals: 6 weeks  Pt to be independent and compliant with HEP for optimal outcomes.  Pt to improve Koos Jr. from 11/28 to <=5/28 for decreased functional limitations.   Pt to improve right knee AROM to 0-5-120 to improve joint mechanics.   Pt be able to negotiate stairs with unilateral HR, steady pattern for ease with negotiating her home environment.   Pt to demo improve hip strength to >= 5-/5 for ease with transfers/walking tolerance.           Patient Education:   [x]  HEP/Education Completed: step stretches, hamstring curls, backward walking  [employer] Access Code: [email or web address]   []  No new Education completed  [x]  Reviewed Prior HEP      [x]  Patient verbalized and/or demonstrated understanding of education provided.  []  Patient unable to verbalize and/or demonstrate understanding of education provided.  Will continue education.  []  Barriers to learning:     PLAN:  Treatment Recommendations: Strengthening, Range of Motion, Balance Training, Gait Training, Stair Training, Neuromuscular Re-education, Manual Therapy - Joint Manipulation, Pain Management, Home Exercise Program, Patient Education, and Modalities    []  Plan of care initiated.  Plan to see patient 2 times per week for 6 weeks to address the treatment planned outlined above.  [x]  Continue with current plan of care  []  Modify plan of care as follows:    []  Hold pending physician visit  []  Discharge    Time In 1005   Time Out 1050   Timed Code Minutes: 45 min   Total Treatment Time: 45 min       Electronically Signed by: Lexus Morfin, PT

## 2025-01-28 ENCOUNTER — HOSPITAL ENCOUNTER (OUTPATIENT)
Dept: PHYSICAL THERAPY | Age: 65
Setting detail: THERAPIES SERIES
Discharge: HOME | End: 2025-01-28
Payer: COMMERCIAL

## 2025-01-28 ENCOUNTER — TELEPHONE (OUTPATIENT)
Dept: FAMILY MEDICINE CLINIC | Age: 65
End: 2025-01-28

## 2025-01-28 PROCEDURE — 97110 THERAPEUTIC EXERCISES: CPT

## 2025-01-28 NOTE — PROGRESS NOTES
[facility]  PHYSICAL THERAPY  [] EVALUATION  [x] DAILY NOTE (LAND) [] DAILY NOTE (AQUATIC ) [] PROGRESS NOTE [] DISCHARGE NOTE    [x] OUTPATIENT REHABILITATION CENTER [facility]   [] [place] [facility] [place]    [] [facility]   [] [facility]    Date: 2025  Patient Name:  So Plata  : 1960  MRN: [id number]  CSN: [id number]    Referring Practitioner Antonio Whalen PA-C [id number]      Diagnosis  Diagnoses       Z47.1 (ICD-10-CM) - Aftercare following joint replacement surgery    Z96.651 (ICD-10-CM) - Presence of right artificial knee joint           Treatment Diagnosis Z47.1 Aftercare following joint replacement surgery  M25.561  Right Knee Pain  M25.661 Stiffness of right knee, not elsewhere classified  R53.1 Weakness  R26.2 Difficulty in walking   Date of Evaluation 25   Additional Pertinent History So Plata has a past medical history of COPD (chronic obstructive pulmonary disease) (HCC), GERD (gastroesophageal reflux disease), Hypertension, Obesity, Restless legs syndrome, and Unspecified sleep apnea.  she has a past surgical history that includes Tubal ligation (); Foot surgery; Endometrial ablation; and Achilles tendon surgery (Left, 2022).     Allergies Allergies   Allergen Reactions    Sulfa Antibiotics Swelling     Feet swelled      Medications   Current Outpatient Medications:     gabapentin (NEURONTIN) 300 MG capsule, , Disp: , Rfl:     metoprolol succinate (TOPROL XL) 25 MG extended release tablet, Take 1 tablet by mouth daily, Disp: 90 tablet, Rfl: 1    triamterene-hydroCHLOROthiazide (MAXZIDE-25) 37.5-25 MG per tablet, Take 0.5 tablets by mouth daily, Disp: 45 tablet, Rfl: 1    DULoxetine (CYMBALTA) 30 MG extended release capsule, Take 1 capsule by mouth daily, Disp: 90 capsule, Rfl: 3    albuterol sulfate HFA (VENTOLIN HFA) 108 (90 Base) MCG/ACT inhaler, Inhale 2 puffs into the lungs 4 times daily as needed for Wheezing

## 2025-01-30 ENCOUNTER — HOSPITAL ENCOUNTER (OUTPATIENT)
Dept: PHYSICAL THERAPY | Age: 65
Setting detail: THERAPIES SERIES
Discharge: HOME | End: 2025-01-30
Payer: COMMERCIAL

## 2025-01-30 PROCEDURE — 97110 THERAPEUTIC EXERCISES: CPT

## 2025-01-30 NOTE — PROGRESS NOTES
[facility]  PHYSICAL THERAPY  [] EVALUATION  [x] DAILY NOTE (LAND) [] DAILY NOTE (AQUATIC ) [] PROGRESS NOTE [] DISCHARGE NOTE    [x] OUTPATIENT REHABILITATION CENTER [facility]   [] [place] [facility] [place]    [] [facility]   [] [facility]    Date: 2025  Patient Name:  So Plata  : 1960  MRN: [id number]  CSN: [id number]    Referring Practitioner Antonio Whalen PA-C [id number]      Diagnosis  Diagnoses       Z47.1 (ICD-10-CM) - Aftercare following joint replacement surgery    Z96.651 (ICD-10-CM) - Presence of right artificial knee joint           Treatment Diagnosis Z47.1 Aftercare following joint replacement surgery  M25.561  Right Knee Pain  M25.661 Stiffness of right knee, not elsewhere classified  R53.1 Weakness  R26.2 Difficulty in walking   Date of Evaluation 25   Additional Pertinent History So Plata has a past medical history of COPD (chronic obstructive pulmonary disease) (HCC), GERD (gastroesophageal reflux disease), Hypertension, Obesity, Restless legs syndrome, and Unspecified sleep apnea.  she has a past surgical history that includes Tubal ligation (); Foot surgery; Endometrial ablation; and Achilles tendon surgery (Left, 2022).     Allergies Allergies   Allergen Reactions    Sulfa Antibiotics Swelling     Feet swelled      Medications   Current Outpatient Medications:     gabapentin (NEURONTIN) 300 MG capsule, , Disp: , Rfl:     metoprolol succinate (TOPROL XL) 25 MG extended release tablet, Take 1 tablet by mouth daily, Disp: 90 tablet, Rfl: 1    triamterene-hydroCHLOROthiazide (MAXZIDE-25) 37.5-25 MG per tablet, Take 0.5 tablets by mouth daily, Disp: 45 tablet, Rfl: 1    DULoxetine (CYMBALTA) 30 MG extended release capsule, Take 1 capsule by mouth daily, Disp: 90 capsule, Rfl: 3    albuterol sulfate HFA (VENTOLIN HFA) 108 (90 Base) MCG/ACT inhaler, Inhale 2 puffs into the lungs 4 times daily as needed for Wheezing

## 2025-02-04 ENCOUNTER — HOSPITAL ENCOUNTER (OUTPATIENT)
Dept: PHYSICAL THERAPY | Age: 65
Setting detail: THERAPIES SERIES
Discharge: HOME | End: 2025-02-04
Payer: COMMERCIAL

## 2025-02-04 PROCEDURE — 97110 THERAPEUTIC EXERCISES: CPT

## 2025-02-04 NOTE — PROGRESS NOTES
her bed and chair back to her home.     Current HH HEP: Heel / Toe Raises, side stepping, leg raises, walking, ankle pumps, supine Hip Abd, Heel Slides, SLR, Heel Prop, Step-ups / down, marching, cone taps      SUBJECTIVE: Pt states 2/10 knee pain upon arrival. States previous session went well.      OBJECTIVE:    TREATMENT   Precautions: Right TKR on 12/20/24, planning on having L TKR in 2026   Pain: 2/10 Right Anterior Knee     \"X” in shaded column indicates activity completed today    “*\" next to exercise/intervention indicates progression   Modalities Parameters/  Location  Notes                     Manual Therapy Time/Technique  Notes   PROM Knee Extension       Patellar mobilizations  x          Exercise/Intervention   Notes   Upright Bike: seat 6 6 min Level 2* x Full revolutions   Strap Gastroc Stretch c Heel Prop  2x30s  x    Long Seated HS Stretch 2x30s                    Quad Set with heel on bolster and overpressure from therapist 10x5 sec  x Lacking 7 (D1) deg extension   Seated Heel Prop  2 min 4# x    SAQ  2x15*x3s  x    SLR 2x10  x    Heel Slide c Strap  10x3 sec    120 deg   Heel slides 10   117 deg (I1)   LAQ 2x12*x3 sec  x    Resisted TKE  15x5s Orange  x                  Step stretches: R knee flexion, hamstring, calf 3x20 sec  x    HR/TR* 10  x    Marches* 10  x    Hamstring curls right 10      Backward walking 2 laps // bar            Interventions Next Treatment: Restore knee ROM with emphasis on extension, RLE strengthening emphasis on quad, gait/balance training     Activity/Treatment Tolerance:  [x]  Patient tolerated treatment well  []  Patient limited by fatigue  []  Patient limited by pain   []  Patient limited by medical complications  []  Other:     Assessment: Right knee AROM improved to lacking 7 degrees from neutral extension. Did not measure flexion this date, progressed standing exercises. Emphasized the importance of TKE throughout session.     GOALS:  Patient Goal: Return to

## 2025-02-06 ENCOUNTER — HOSPITAL ENCOUNTER (OUTPATIENT)
Dept: PHYSICAL THERAPY | Age: 65
Setting detail: THERAPIES SERIES
Discharge: HOME | End: 2025-02-06
Payer: COMMERCIAL

## 2025-02-06 PROCEDURE — 97110 THERAPEUTIC EXERCISES: CPT

## 2025-02-06 PROCEDURE — 97140 MANUAL THERAPY 1/> REGIONS: CPT

## 2025-02-06 NOTE — PROGRESS NOTES
[facility]  PHYSICAL THERAPY  [] EVALUATION  [x] DAILY NOTE (LAND) [] DAILY NOTE (AQUATIC ) [] PROGRESS NOTE [] DISCHARGE NOTE    [x] OUTPATIENT REHABILITATION CENTER [facility]   [] [place] [facility] [place]    [] [facility]   [] [facility]    Date: 2025  Patient Name:  So Plata  : 1960  MRN: [id number]  CSN: [id number]    Referring Practitioner Antonio Whalen PA-C [id number]      Diagnosis  Diagnoses       Z47.1 (ICD-10-CM) - Aftercare following joint replacement surgery    Z96.651 (ICD-10-CM) - Presence of right artificial knee joint           Treatment Diagnosis Z47.1 Aftercare following joint replacement surgery  M25.561  Right Knee Pain  M25.661 Stiffness of right knee, not elsewhere classified  R53.1 Weakness  R26.2 Difficulty in walking   Date of Evaluation 25   Additional Pertinent History So Plata has a past medical history of COPD (chronic obstructive pulmonary disease) (HCC), GERD (gastroesophageal reflux disease), Hypertension, Obesity, Restless legs syndrome, and Unspecified sleep apnea.  she has a past surgical history that includes Tubal ligation (); Foot surgery; Endometrial ablation; and Achilles tendon surgery (Left, 2022).     Allergies Allergies   Allergen Reactions    Sulfa Antibiotics Swelling     Feet swelled      Medications   Current Outpatient Medications:     gabapentin (NEURONTIN) 300 MG capsule, , Disp: , Rfl:     metoprolol succinate (TOPROL XL) 25 MG extended release tablet, Take 1 tablet by mouth daily, Disp: 90 tablet, Rfl: 1    triamterene-hydroCHLOROthiazide (MAXZIDE-25) 37.5-25 MG per tablet, Take 0.5 tablets by mouth daily, Disp: 45 tablet, Rfl: 1    DULoxetine (CYMBALTA) 30 MG extended release capsule, Take 1 capsule by mouth daily, Disp: 90 capsule, Rfl: 3    albuterol sulfate HFA (VENTOLIN HFA) 108 (90 Base) MCG/ACT inhaler, Inhale 2 puffs into the lungs 4 times daily as needed for Wheezing or

## 2025-02-11 ENCOUNTER — HOSPITAL ENCOUNTER (OUTPATIENT)
Dept: PHYSICAL THERAPY | Age: 65
Setting detail: THERAPIES SERIES
Discharge: HOME | End: 2025-02-11
Payer: COMMERCIAL

## 2025-02-11 PROCEDURE — 97110 THERAPEUTIC EXERCISES: CPT

## 2025-02-11 NOTE — PROGRESS NOTES
[facility]  PHYSICAL THERAPY  [] EVALUATION  [x] DAILY NOTE (LAND) [] DAILY NOTE (AQUATIC ) [] PROGRESS NOTE [] DISCHARGE NOTE    [x] OUTPATIENT REHABILITATION CENTER [facility]   [] [place] AM[facility]ULATORY CARE [place]    [] [facility]   [] [facility]    Date: 2025  Patient Name:  So Plata  : 1960  MRN: [id number]  CSN: [id number]    Referring Practitioner Antonio Whalen PA-C [id number]      Diagnosis  Diagnoses       Z47.1 (ICD-10-CM) - Aftercare following joint replacement surgery    Z96.651 (ICD-10-CM) - Presence of right artificial knee joint           Treatment Diagnosis Z47.1 Aftercare following joint replacement surgery  M25.561  Right Knee Pain  M25.661 Stiffness of right knee, not elsewhere classified  R53.1 Weakness  R26.2 Difficulty in walking   Date of Evaluation 25   Additional Pertinent History So Plata has a past medical history of COPD (chronic obstructive pulmonary disease) (HCC), GERD (gastroesophageal reflux disease), Hypertension, Obesity, Restless legs syndrome, and Unspecified sleep apnea.  she has a past surgical history that includes Tubal ligation (); Foot surgery; Endometrial ablation; and Achilles tendon surgery (Left, 2022).     Allergies Allergies   Allergen Reactions    Sulfa Antibiotics Swelling     Feet swelled      Medications   Current Outpatient Medications:     gabapentin (NEURONTIN) 300 MG capsule, , Disp: , Rfl:     metoprolol succinate (TOPROL XL) 25 MG extended release tablet, Take 1 tablet by mouth daily, Disp: 90 tablet, Rfl: 1    triamterene-hydroCHLOROthiazide (MAXZIDE-25) 37.5-25 MG per tablet, Take 0.5 tablets by mouth daily, Disp: 45 tablet, Rfl: 1    DULoxetine (CYMBALTA) 30 MG extended release capsule, Take 1 capsule by mouth daily, Disp: 90 capsule, Rfl: 3    albuterol sulfate HFA (VENTOLIN HFA) 108 (90 Base) MCG/ACT inhaler, Inhale 2 puffs into the lungs 4 times daily as needed for Wheezing

## 2025-02-13 ENCOUNTER — HOSPITAL ENCOUNTER (OUTPATIENT)
Dept: PHYSICAL THERAPY | Age: 65
Setting detail: THERAPIES SERIES
Discharge: HOME | End: 2025-02-13
Payer: COMMERCIAL

## 2025-02-13 PROCEDURE — 97110 THERAPEUTIC EXERCISES: CPT

## 2025-02-13 NOTE — PROGRESS NOTES
[facility]  PHYSICAL THERAPY  [] EVALUATION  [x] DAILY NOTE (LAND) [] DAILY NOTE (AQUATIC ) [] PROGRESS NOTE [] DISCHARGE NOTE    [x] OUTPATIENT REHABILITATION CENTER [facility]   [] [place] [facility] [place]    [] [facility]   [] [facility]    Date: 2025  Patient Name:  So Plata  : 1960  MRN: [id number]  CSN: [id number]    Referring Practitioner Antonio Whalen PA-C [id number]      Diagnosis  Diagnoses       Z47.1 (ICD-10-CM) - Aftercare following joint replacement surgery    Z96.651 (ICD-10-CM) - Presence of right artificial knee joint           Treatment Diagnosis Z47.1 Aftercare following joint replacement surgery  M25.561  Right Knee Pain  M25.661 Stiffness of right knee, not elsewhere classified  R53.1 Weakness  R26.2 Difficulty in walking   Date of Evaluation 25   Additional Pertinent History So Plata has a past medical history of COPD (chronic obstructive pulmonary disease) (HCC), GERD (gastroesophageal reflux disease), Hypertension, Obesity, Restless legs syndrome, and Unspecified sleep apnea.  she has a past surgical history that includes Tubal ligation (); Foot surgery; Endometrial ablation; and Achilles tendon surgery (Left, 2022).     Allergies Allergies   Allergen Reactions    Sulfa Antibiotics Swelling     Feet swelled      Medications   Current Outpatient Medications:     gabapentin (NEURONTIN) 300 MG capsule, , Disp: , Rfl:     metoprolol succinate (TOPROL XL) 25 MG extended release tablet, Take 1 tablet by mouth daily, Disp: 90 tablet, Rfl: 1    triamterene-hydroCHLOROthiazide (MAXZIDE-25) 37.5-25 MG per tablet, Take 0.5 tablets by mouth daily, Disp: 45 tablet, Rfl: 1    DULoxetine (CYMBALTA) 30 MG extended release capsule, Take 1 capsule by mouth daily, Disp: 90 capsule, Rfl: 3    albuterol sulfate HFA (VENTOLIN HFA) 108 (90 Base) MCG/ACT inhaler, Inhale 2 puffs into the lungs 4 times daily as needed for Wheezing

## 2025-02-18 ENCOUNTER — HOSPITAL ENCOUNTER (OUTPATIENT)
Dept: PHYSICAL THERAPY | Age: 65
Setting detail: THERAPIES SERIES
Discharge: HOME | End: 2025-02-18
Payer: COMMERCIAL

## 2025-02-18 PROCEDURE — 97110 THERAPEUTIC EXERCISES: CPT

## 2025-02-18 NOTE — PROGRESS NOTES
[facility]  PHYSICAL THERAPY  [] EVALUATION  [x] DAILY NOTE (LAND) [] DAILY NOTE (AQUATIC ) [] PROGRESS NOTE [] DISCHARGE NOTE    [x] OUTPATIENT REHABILITATION CENTER [facility]   [] [place] [facility]    [] [facility]   [] [facility]    Date: 2025  Patient Name:  So Plata  : 1960  MRN: [id number]  CSN: [id number]    Referring Practitioner Antonio Whalen PA-C [id number]      Diagnosis  Diagnoses       Z47.1 (ICD-10-CM) - Aftercare following joint replacement surgery    Z96.651 (ICD-10-CM) - Presence of right artificial knee joint           Treatment Diagnosis Z47.1 Aftercare following joint replacement surgery  M25.561  Right Knee Pain  M25.661 Stiffness of right knee, not elsewhere classified  R53.1 Weakness  R26.2 Difficulty in walking   Date of Evaluation 25   Additional Pertinent History So Plata has a past medical history of COPD (chronic obstructive pulmonary disease) (HCC), GERD (gastroesophageal reflux disease), Hypertension, Obesity, Restless legs syndrome, and Unspecified sleep apnea.  she has a past surgical history that includes Tubal ligation (); Foot surgery; Endometrial ablation; and Achilles tendon surgery (Left, 2022).     Allergies Allergies   Allergen Reactions    Sulfa Antibiotics Swelling     Feet swelled      Medications   Current Outpatient Medications:     gabapentin (NEURONTIN) 300 MG capsule, , Disp: , Rfl:     metoprolol succinate (TOPROL XL) 25 MG extended release tablet, Take 1 tablet by mouth daily, Disp: 90 tablet, Rfl: 1    triamterene-hydroCHLOROthiazide (MAXZIDE-25) 37.5-25 MG per tablet, Take 0.5 tablets by mouth daily, Disp: 45 tablet, Rfl: 1    DULoxetine (CYMBALTA) 30 MG extended release capsule, Take 1 capsule by mouth daily, Disp: 90 capsule, Rfl: 3    albuterol sulfate HFA (VENTOLIN HFA) 108 (90 Base) MCG/ACT inhaler, Inhale 2 puffs into the lungs 4 times daily as needed for Wheezing

## 2025-02-20 ENCOUNTER — HOSPITAL ENCOUNTER (OUTPATIENT)
Dept: PHYSICAL THERAPY | Age: 65
Setting detail: THERAPIES SERIES
Discharge: HOME | End: 2025-02-20
Payer: COMMERCIAL

## 2025-02-20 PROCEDURE — 97110 THERAPEUTIC EXERCISES: CPT

## 2025-02-20 NOTE — PROGRESS NOTES
* PLEASE SIGN, DATE AND TIME CERTIFICATION BELOW AND RETURN TO [facility] OUTPATIENT REHABILITATION (FAX #: [phone]).  ATTEST/CO-SIGN IF ACCESSING VIA [employer].  THANK YOU.**    I certify that I have examined the patient below and determined that Physical Medicine and Rehabilitation service is necessary and that I approve the established plan of care for up to 90 days or as specifically noted.  Attestation, signature or co-signature of physician indicates approval of certification requirements.    ________________________ ____________  Physician Signature   Date     [facility]  PHYSICAL THERAPY  [] EVALUATION  [] DAILY NOTE (LAND) [] DAILY NOTE (AQUATIC ) [x] PROGRESS NOTE [] DISCHARGE NOTE    [x] OUTPATIENT REHABILITATION CENTER [facility]   [] [facility]    [] [facility]   [] [facility]    Date: 2025  Patient Name:  So Plata  : 1960  MRN: [id number]  CSN: [id number]    Referring Practitioner Antonio Whalen PA-C [id number]      Diagnosis  Diagnoses       Z47.1 (ICD-10-CM) - Aftercare following joint replacement surgery    Z96.651 (ICD-10-CM) - Presence of right artificial knee joint           Treatment Diagnosis Z47.1 Aftercare following joint replacement surgery  M25.561  Right Knee Pain  M25.661 Stiffness of right knee, not elsewhere classified  R53.1 Weakness  R26.2 Difficulty in walking   Date of Evaluation 25   Additional Pertinent History So Plata has a past medical history of COPD (chronic obstructive pulmonary disease) (HCC), GERD (gastroesophageal reflux disease), Hypertension, Obesity, Restless legs syndrome, and Unspecified sleep apnea.  she has a past surgical history that includes Tubal ligation (); Foot surgery; Endometrial ablation; and Achilles tendon surgery (Left, 2022).     Allergies Allergies   Allergen Reactions    Sulfa Antibiotics Swelling     Feet swelled      Medications   Current

## 2025-02-24 ENCOUNTER — HOSPITAL ENCOUNTER (OUTPATIENT)
Dept: PHYSICAL THERAPY | Age: 65
Setting detail: THERAPIES SERIES
Discharge: HOME | End: 2025-02-24
Payer: COMMERCIAL

## 2025-02-24 PROCEDURE — 97112 NEUROMUSCULAR REEDUCATION: CPT

## 2025-02-24 PROCEDURE — 97110 THERAPEUTIC EXERCISES: CPT

## 2025-02-24 NOTE — PROGRESS NOTES
[facility]  PHYSICAL THERAPY  [] EVALUATION  [x] DAILY NOTE (LAND) [] DAILY NOTE (AQUATIC ) [] PROGRESS NOTE [] DISCHARGE NOTE    [x] OUTPATIENT REHABILITATION CENTER [facility]   [] [place] [facility]    [] [facility]   [] [facility]    Date: 2025  Patient Name:  So Plata  : 1960  MRN: [id number]  CSN: [id number]    Referring Practitioner Antonio Whalen PA-C [id number]      Diagnosis  Diagnoses       Z47.1 (ICD-10-CM) - Aftercare following joint replacement surgery    Z96.651 (ICD-10-CM) - Presence of right artificial knee joint           Treatment Diagnosis Z47.1 Aftercare following joint replacement surgery  M25.561  Right Knee Pain  M25.661 Stiffness of right knee, not elsewhere classified  R53.1 Weakness  R26.2 Difficulty in walking   Date of Evaluation 25   Additional Pertinent History So Plata has a past medical history of COPD (chronic obstructive pulmonary disease) (HCC), GERD (gastroesophageal reflux disease), Hypertension, Obesity, Restless legs syndrome, and Unspecified sleep apnea.  she has a past surgical history that includes Tubal ligation (); Foot surgery; Endometrial ablation; and Achilles tendon surgery (Left, 2022).     Allergies Allergies   Allergen Reactions    Sulfa Antibiotics Swelling     Feet swelled      Medications   Current Outpatient Medications:     gabapentin (NEURONTIN) 300 MG capsule, , Disp: , Rfl:     metoprolol succinate (TOPROL XL) 25 MG extended release tablet, Take 1 tablet by mouth daily, Disp: 90 tablet, Rfl: 1    triamterene-hydroCHLOROthiazide (MAXZIDE-25) 37.5-25 MG per tablet, Take 0.5 tablets by mouth daily, Disp: 45 tablet, Rfl: 1    DULoxetine (CYMBALTA) 30 MG extended release capsule, Take 1 capsule by mouth daily, Disp: 90 capsule, Rfl: 3    albuterol sulfate HFA (VENTOLIN HFA) 108 (90 Base) MCG/ACT inhaler, Inhale 2 puffs into the lungs 4 times daily as needed for

## 2025-02-27 ENCOUNTER — HOSPITAL ENCOUNTER (OUTPATIENT)
Dept: PHYSICAL THERAPY | Age: 65
Setting detail: THERAPIES SERIES
Discharge: HOME | End: 2025-02-27
Payer: COMMERCIAL

## 2025-02-27 PROCEDURE — 97110 THERAPEUTIC EXERCISES: CPT

## 2025-02-27 NOTE — PROGRESS NOTES
[facility]  PHYSICAL THERAPY  [] EVALUATION  [x] DAILY NOTE (LAND) [] DAILY NOTE (AQUATIC ) [] PROGRESS NOTE [] DISCHARGE NOTE    [x] OUTPATIENT REHABILITATION CENTER [facility]   [] [place] [facility]ARE CENTER    [] [facility]   [] [facility]    Date: 2025  Patient Name:  So Plata  : 1960  MRN: [id number]  CSN: [id number]    Referring Practitioner Antonio Whalen PA-C [id number]      Diagnosis  Diagnoses       Z47.1 (ICD-10-CM) - Aftercare following joint replacement surgery    Z96.651 (ICD-10-CM) - Presence of right artificial knee joint           Treatment Diagnosis Z47.1 Aftercare following joint replacement surgery  M25.561  Right Knee Pain  M25.661 Stiffness of right knee, not elsewhere classified  R53.1 Weakness  R26.2 Difficulty in walking   Date of Evaluation 25   Additional Pertinent History So Plata has a past medical history of COPD (chronic obstructive pulmonary disease) (HCC), GERD (gastroesophageal reflux disease), Hypertension, Obesity, Restless legs syndrome, and Unspecified sleep apnea.  she has a past surgical history that includes Tubal ligation (); Foot surgery; Endometrial ablation; and Achilles tendon surgery (Left, 2022).     Allergies Allergies   Allergen Reactions    Sulfa Antibiotics Swelling     Feet swelled      Medications   Current Outpatient Medications:     gabapentin (NEURONTIN) 300 MG capsule, , Disp: , Rfl:     metoprolol succinate (TOPROL XL) 25 MG extended release tablet, Take 1 tablet by mouth daily, Disp: 90 tablet, Rfl: 1    triamterene-hydroCHLOROthiazide (MAXZIDE-25) 37.5-25 MG per tablet, Take 0.5 tablets by mouth daily, Disp: 45 tablet, Rfl: 1    DULoxetine (CYMBALTA) 30 MG extended release capsule, Take 1 capsule by mouth daily, Disp: 90 capsule, Rfl: 3    albuterol sulfate HFA (VENTOLIN HFA) 108 (90 Base) MCG/ACT inhaler, Inhale 2 puffs into the lungs 4 times daily as needed for

## 2025-03-03 ENCOUNTER — HOSPITAL ENCOUNTER (OUTPATIENT)
Dept: PHYSICAL THERAPY | Age: 65
Setting detail: THERAPIES SERIES
Discharge: HOME | End: 2025-03-03
Payer: COMMERCIAL

## 2025-03-03 PROCEDURE — 97110 THERAPEUTIC EXERCISES: CPT

## 2025-03-03 NOTE — PROGRESS NOTES
waiting for family to move her bed and chair back to her home.     Current HH HEP: Heel / Toe Raises, side stepping, leg raises, walking, ankle pumps, supine Hip Abd, Heel Slides, SLR, Heel Prop, Step-ups / down, marching, cone taps      SUBJECTIVE:  Patient reports HEP is going well. Patient states she feels like she is able to get her leg all the way down when she does the extension exercises at home.      OBJECTIVE:    TREATMENT   Precautions: Right TKR on 12/20/24, planning on having L TKR in 2026  Plan to return to work 3/24/25   Pain: 2/10 Right Anterior Medial Knee     \"X” in shaded column indicates activity completed today    “*\" next to exercise/intervention indicates progression   Modalities Parameters/  Location  Notes                     Manual Therapy Time/Technique  Notes   PROM Knee Extension   X Improved tolerance with lumbar support    Patellar mobilizations  X    STM to IT band and distal quad* 4 min X    Knee Extension Neutral/Varus/Valgus      STM medial and lateral hamstring tendons at knee 2 min           TOTAL MANUAL  6 min  X    Exercise/Intervention   Notes   Upright Bike: seat 6 6 min Level 2  Full revolutions   Retro Walking on TM  5 min  S: 0.9 X Cueing for terminal knee extension    Strap Gastroc Stretch c Heel Prop  2x30s  X    Long Seated HS Stretch 2x30s  X                  Quad Set with heel on bolster 5x5 sec  X Lacking 2 (I4) deg extension    Seated Heel Prop  3 min 4#     SAQ  [id number]      SLR 2x10      Heel Slide c Strap  8x  X AAROM 117 deg (I2) anterior knee pain     Heel slides 1   117 deg (S)   LAQ 2x12x3 sec      Resisted TKE  20x3s Ball                    Step stretches: calf 2x30 sec  X    Alt Step Tapping  2x15 ea  6\"* X Light UUE support progressing to no UE support    Eccentric step down R CKC 2x5 4\"  Cued for proper technique and less reliance of UE   Step Up forward 2x10  6\"  X    Reciprocal Stair training  4x4 6\"* X Light BUE support    Marches 10      Mini squats 12

## 2025-03-06 ENCOUNTER — HOSPITAL ENCOUNTER (OUTPATIENT)
Dept: PHYSICAL THERAPY | Age: 65
Setting detail: THERAPIES SERIES
Discharge: HOME | End: 2025-03-06
Payer: COMMERCIAL

## 2025-03-06 PROCEDURE — 97140 MANUAL THERAPY 1/> REGIONS: CPT

## 2025-03-06 PROCEDURE — 97110 THERAPEUTIC EXERCISES: CPT

## 2025-03-06 NOTE — PROGRESS NOTES
progressing to no UE support    Eccentric step down R CKC 2x5 4\"  Cued for proper technique and less reliance of UE   Step Up forward 2x10  6\"      Reciprocal Stair training  4x4 6\"*  Light BUE support    Marches 10      Mini squats 12      Hamstring curls right 10      Dynamic Marching / Retro walking *  4x20 ft. Ea   X Intermittent UE Assist / Min A to correct LOB   Low Noah Sling Shot  2x10 ea   X Intermittent Light UUE support   Objective / Goal assessment / POC        Interventions Next Treatment: Restore knee ROM with emphasis on extension, RLE strengthening emphasis on quad, gait/balance training     Activity/Treatment Tolerance:  [x]  Patient tolerated treatment well  []  Patient limited by fatigue  []  Patient limited by pain   []  Patient limited by medical complications  []  Other:     Assessment: Pt demo decreased extension ROM this date, therefore increased treatment time spent on restoring ROM. Cueing throughout treatment to decrease UE assist, apprehensive, but able to within session. Pt noted knee feeling \"better\" at the completion of the PT session.     GOALS:  Patient Goal: Return to working and living in her own home     Short Term Goals: Deferred to long term     Long Term Goals: 6 weeks  Pt to be independent and compliant with HEP for optimal outcomes.   Pt to improve Koos Jr. from 11/28 to <=5/28 for decreased functional limitations.   Pt to improve right knee AROM to 0-5-120 to improve joint mechanics.  Pt be able to negotiate stairs with unilateral HR reciprocally, steady pattern for ease with negotiating her home environment.   Pt to demo improve hip strength to >= 5-/5 for ease with transfers/walking tolerance.           Patient Education:   [x]  HEP/Education Completed: ROM, HEP compliance   [facility] Access Code: [email or web address]   []  No new Education completed  []  Reviewed Prior HEP      [x]  Patient verbalized and/or demonstrated understanding of education provided.  []  Patient unable to

## 2025-03-10 ENCOUNTER — HOSPITAL ENCOUNTER (OUTPATIENT)
Dept: PHYSICAL THERAPY | Age: 65
Setting detail: THERAPIES SERIES
Discharge: HOME | End: 2025-03-10
Payer: COMMERCIAL

## 2025-03-10 PROCEDURE — 97110 THERAPEUTIC EXERCISES: CPT

## 2025-03-10 NOTE — PROGRESS NOTES
[facility]  PHYSICAL THERAPY  [] EVALUATION  [x] DAILY NOTE (LAND) [] DAILY NOTE (AQUATIC ) [] PROGRESS NOTE [] DISCHARGE NOTE    [x] OUTPATIENT REHABILITATION CENTER [facility]   [] [facility]    [] [facility]   [] [facility]    Date: 3/10/2025  Patient Name:  So Plata  : 1960  MRN: [id number]  CSN: [id number]    Referring Practitioner Antonio Whalen PA-C [id number]      Diagnosis  Diagnoses       Z47.1 (ICD-10-CM) - Aftercare following joint replacement surgery    Z96.651 (ICD-10-CM) - Presence of right artificial knee joint           Treatment Diagnosis Z47.1 Aftercare following joint replacement surgery  M25.561  Right Knee Pain  M25.661 Stiffness of right knee, not elsewhere classified  R53.1 Weakness  R26.2 Difficulty in walking   Date of Evaluation 25   Additional Pertinent History So Plata has a past medical history of COPD (chronic obstructive pulmonary disease) (HCC), GERD (gastroesophageal reflux disease), Hypertension, Obesity, Restless legs syndrome, and Unspecified sleep apnea.  she has a past surgical history that includes Tubal ligation (); Foot surgery; Endometrial ablation; and Achilles tendon surgery (Left, 2022).     Allergies Allergies   Allergen Reactions    Sulfa Antibiotics Swelling     Feet swelled      Medications   Current Outpatient Medications:     gabapentin (NEURONTIN) 300 MG capsule, , Disp: , Rfl:     metoprolol succinate (TOPROL XL) 25 MG extended release tablet, Take 1 tablet by mouth daily, Disp: 90 tablet, Rfl: 1    triamterene-hydroCHLOROthiazide (MAXZIDE-25) 37.5-25 MG per tablet, Take 0.5 tablets by mouth daily, Disp: 45 tablet, Rfl: 1    DULoxetine (CYMBALTA) 30 MG extended release capsule, Take 1 capsule by mouth daily, Disp: 90 capsule, Rfl: 3    albuterol sulfate HFA (VENTOLIN HFA) 108 (90 Base) MCG/ACT inhaler, Inhale 2 puffs into the lungs 4 times daily as needed for

## 2025-03-13 ENCOUNTER — HOSPITAL ENCOUNTER (OUTPATIENT)
Dept: PHYSICAL THERAPY | Age: 65
Setting detail: THERAPIES SERIES
Discharge: HOME | End: 2025-03-13
Payer: COMMERCIAL

## 2025-03-13 PROCEDURE — 97110 THERAPEUTIC EXERCISES: CPT

## 2025-03-17 ENCOUNTER — HOSPITAL ENCOUNTER (OUTPATIENT)
Dept: PHYSICAL THERAPY | Age: 65
Setting detail: THERAPIES SERIES
Discharge: HOME | End: 2025-03-17
Payer: COMMERCIAL

## 2025-03-17 PROCEDURE — 97110 THERAPEUTIC EXERCISES: CPT

## 2025-03-17 NOTE — PROGRESS NOTES
Hamstring curls right 10      Dynamic Marching / Retro walking  4x20 ft. Ea   x    Low Noah Sling Shot  2x15 ea   x Intermittent UUE support   Eccentric heel taps 10 B      Objective / Goal assessment / POC        Interventions Next Treatment: Restore knee ROM with emphasis on extension, RLE strengthening emphasis on quad, gait/balance training     Activity/Treatment Tolerance:  [x]  Patient tolerated treatment well  []  Patient limited by fatigue  []  Patient limited by pain   []  Patient limited by medical complications  []  Other:     Assessment: Initiated session with manual and therex to address ROM deficits. Added sit-to-stand with slight elevation, Pt completing without exacerbation of pain. Pt noted knee feeling \"better\" at the completion of the PT session.     GOALS:  Patient Goal: Return to working and living in her own home     Short Term Goals: Deferred to long term     Long Term Goals: 6 weeks  Pt to be independent and compliant with HEP for optimal outcomes.   Pt to improve Koos Jr. from 11/28 to <=5/28 for decreased functional limitations.   Pt to improve right knee AROM to 0-5-120 to improve joint mechanics.  Pt be able to negotiate stairs with unilateral HR reciprocally, steady pattern for ease with negotiating her home environment.   Pt to demo improve hip strength to >= 5-/5 for ease with transfers/walking tolerance.           Patient Education:   [x]  HEP/Education Completed: body mechanics, HEP  [facility] Access Code: [email or web address]   []  No new Education completed  []  Reviewed Prior HEP      [x]  Patient verbalized and/or demonstrated understanding of education provided.  []  Patient unable to verbalize and/or demonstrate understanding of education provided.  Will continue education.  []  Barriers to learning:     PLAN:  Treatment Recommendations: Strengthening, Range of Motion, Balance Training, Gait Training, Stair Training, Neuromuscular Re-education, Manual Therapy - Joint Manipulation, Pain

## 2025-03-19 ENCOUNTER — HOSPITAL ENCOUNTER (OUTPATIENT)
Dept: PHYSICAL THERAPY | Age: 65
Setting detail: THERAPIES SERIES
Discharge: HOME | End: 2025-03-19
Payer: COMMERCIAL

## 2025-03-19 PROCEDURE — 97110 THERAPEUTIC EXERCISES: CPT

## 2025-03-19 NOTE — PROGRESS NOTES
[facility]  PHYSICAL THERAPY  [] EVALUATION  [] DAILY NOTE (LAND) [] DAILY NOTE (AQUATIC ) [x] PROGRESS NOTE [] DISCHARGE NOTE    [x] OUTPATIENT REHABILITATION CENTER [facility]   [] [facility] [place]    [] [facility]   [] [facility]    Date: 3/19/2025  Patient Name:  So Plata  : 1960  MRN: [id number]  CSN: [id number]    Referring Practitioner Antonio Whalen PA-C [id number]      Diagnosis  Diagnoses       Z47.1 (ICD-10-CM) - Aftercare following joint replacement surgery    Z96.651 (ICD-10-CM) - Presence of right artificial knee joint           Treatment Diagnosis Z47.1 Aftercare following joint replacement surgery  M25.561  Right Knee Pain  M25.661 Stiffness of right knee, not elsewhere classified  R53.1 Weakness  R26.2 Difficulty in walking   Date of Evaluation 25   Additional Pertinent History So Plata has a past medical history of COPD (chronic obstructive pulmonary disease) (HCC), GERD (gastroesophageal reflux disease), Hypertension, Obesity, Restless legs syndrome, and Unspecified sleep apnea.  she has a past surgical history that includes Tubal ligation (); Foot surgery; Endometrial ablation; and Achilles tendon surgery (Left, 2022).     Allergies Allergies   Allergen Reactions    Sulfa Antibiotics Swelling     Feet swelled      Medications   Current Outpatient Medications:     gabapentin (NEURONTIN) 300 MG capsule, , Disp: , Rfl:     metoprolol succinate (TOPROL XL) 25 MG extended release tablet, Take 1 tablet by mouth daily, Disp: 90 tablet, Rfl: 1    triamterene-hydroCHLOROthiazide (MAXZIDE-25) 37.5-25 MG per tablet, Take 0.5 tablets by mouth daily, Disp: 45 tablet, Rfl: 1    DULoxetine (CYMBALTA) 30 MG extended release capsule, Take 1 capsule by mouth daily, Disp: 90 capsule, Rfl: 3    albuterol sulfate HFA (VENTOLIN HFA) 108 (90 Base) MCG/ACT inhaler, Inhale 2 puffs into the lungs 4 times daily as needed for

## 2025-03-24 ENCOUNTER — TELEPHONE (OUTPATIENT)
Dept: CARDIOLOGY CLINIC | Age: 65
End: 2025-03-24

## 2025-03-24 DIAGNOSIS — Z98.890 S/P CARDIAC CATH: ICD-10-CM

## 2025-03-24 DIAGNOSIS — R00.2 INTERMITTENT PALPITATIONS: ICD-10-CM

## 2025-03-24 DIAGNOSIS — R07.89 CHEST PAIN, ATYPICAL: ICD-10-CM

## 2025-03-24 DIAGNOSIS — R94.31 ABNORMAL EKG: ICD-10-CM

## 2025-03-24 DIAGNOSIS — R42 DIZZINESS ON STANDING: ICD-10-CM

## 2025-03-24 DIAGNOSIS — R06.02 SOB (SHORTNESS OF BREATH) ON EXERTION: Primary | ICD-10-CM

## 2025-04-03 RX ORDER — TRIAMTERENE AND HYDROCHLOROTHIAZIDE 37.5; 25 MG/1; MG/1
TABLET ORAL
Qty: 15 TABLET | Refills: 0 | Status: SHIPPED | OUTPATIENT
Start: 2025-04-03

## 2025-04-10 RX ORDER — TRIAMTERENE AND HYDROCHLOROTHIAZIDE 37.5; 25 MG/1; MG/1
TABLET ORAL
Qty: 45 TABLET | Refills: 0 | Status: SHIPPED | OUTPATIENT
Start: 2025-04-10

## 2025-05-05 ENCOUNTER — COMMUNITY OUTREACH (OUTPATIENT)
Dept: FAMILY MEDICINE CLINIC | Age: 65
End: 2025-05-05

## 2025-05-06 ENCOUNTER — LAB (OUTPATIENT)
Dept: LAB | Age: 65
End: 2025-05-06

## 2025-05-06 DIAGNOSIS — R07.89 CHEST PAIN, ATYPICAL: ICD-10-CM

## 2025-05-06 DIAGNOSIS — R06.02 SOB (SHORTNESS OF BREATH) ON EXERTION: ICD-10-CM

## 2025-05-06 DIAGNOSIS — R94.31 ABNORMAL EKG: ICD-10-CM

## 2025-05-06 DIAGNOSIS — R00.2 INTERMITTENT PALPITATIONS: ICD-10-CM

## 2025-05-06 DIAGNOSIS — R42 DIZZINESS ON STANDING: ICD-10-CM

## 2025-05-06 DIAGNOSIS — Z98.890 S/P CARDIAC CATH: ICD-10-CM

## 2025-05-06 LAB
ALBUMIN SERPL BCG-MCNC: 3.9 G/DL (ref 3.4–4.9)
ALP SERPL-CCNC: 101 U/L (ref 38–126)
ALT SERPL W/O P-5'-P-CCNC: 29 U/L (ref 10–35)
ANION GAP SERPL CALC-SCNC: 12 MEQ/L (ref 8–16)
AST SERPL-CCNC: 28 U/L (ref 10–35)
BILIRUB CONJ SERPL-MCNC: 0.3 MG/DL (ref 0–0.2)
BILIRUB SERPL-MCNC: 0.6 MG/DL (ref 0.3–1.2)
BUN SERPL-MCNC: 14 MG/DL (ref 8–23)
CALCIUM SERPL-MCNC: 9.5 MG/DL (ref 8.8–10.2)
CHLORIDE SERPL-SCNC: 101 MEQ/L (ref 98–111)
CHOLEST SERPL-MCNC: 178 MG/DL (ref 100–199)
CO2 SERPL-SCNC: 24 MEQ/L (ref 22–29)
CREAT SERPL-MCNC: 0.8 MG/DL (ref 0.5–0.9)
GFR SERPL CREATININE-BSD FRML MDRD: 81 ML/MIN/1.73M2
GLUCOSE SERPL-MCNC: 158 MG/DL (ref 74–109)
HDLC SERPL-MCNC: 49 MG/DL
LDLC SERPL CALC-MCNC: 110 MG/DL
MAGNESIUM SERPL-MCNC: 1.7 MG/DL (ref 1.6–2.6)
POTASSIUM SERPL-SCNC: 4 MEQ/L (ref 3.5–5.2)
PROT SERPL-MCNC: 7.5 G/DL (ref 6.4–8.3)
SODIUM SERPL-SCNC: 137 MEQ/L (ref 135–145)
TRIGL SERPL-MCNC: 97 MG/DL (ref 0–199)

## 2025-05-07 LAB
CHOLEST SERPL-MCNC: 180 MG/DL (ref 0–199)
FASTING: YES
GLUCOSE SERPL-MCNC: 110 MG/DL (ref 74–109)
HDLC SERPL-MCNC: 50 MG/DL (ref 40–90)
LDLC SERPL CALC-MCNC: 105 MG/DL
TRIGL SERPL-MCNC: 123 MG/DL (ref 0–199)

## 2025-05-08 ENCOUNTER — OFFICE VISIT (OUTPATIENT)
Dept: CARDIOLOGY CLINIC | Age: 65
End: 2025-05-08
Payer: COMMERCIAL

## 2025-05-08 VITALS
DIASTOLIC BLOOD PRESSURE: 76 MMHG | SYSTOLIC BLOOD PRESSURE: 114 MMHG | BODY MASS INDEX: 43.23 KG/M2 | HEART RATE: 77 BPM | WEIGHT: 244 LBS | HEIGHT: 63 IN

## 2025-05-08 DIAGNOSIS — Z98.890 S/P CARDIAC CATH: ICD-10-CM

## 2025-05-08 DIAGNOSIS — R42 DIZZINESS ON STANDING: ICD-10-CM

## 2025-05-08 DIAGNOSIS — I10 PRIMARY HYPERTENSION: Primary | ICD-10-CM

## 2025-05-08 DIAGNOSIS — R06.02 SOB (SHORTNESS OF BREATH) ON EXERTION: ICD-10-CM

## 2025-05-08 DIAGNOSIS — R00.0 SINUS TACHYCARDIA: ICD-10-CM

## 2025-05-08 DIAGNOSIS — E66.01 MORBID OBESITY DUE TO EXCESS CALORIES (HCC): ICD-10-CM

## 2025-05-08 DIAGNOSIS — R94.31 ABNORMAL EKG: ICD-10-CM

## 2025-05-08 DIAGNOSIS — R60.0 BILATERAL LEG EDEMA: ICD-10-CM

## 2025-05-08 DIAGNOSIS — R00.2 INTERMITTENT PALPITATIONS: ICD-10-CM

## 2025-05-08 PROBLEM — E87.70 FLUID OVERLOAD, UNSPECIFIED: Status: RESOLVED | Noted: 2022-11-11 | Resolved: 2025-05-08

## 2025-05-08 PROCEDURE — 3078F DIAST BP <80 MM HG: CPT | Performed by: INTERNAL MEDICINE

## 2025-05-08 PROCEDURE — 1123F ACP DISCUSS/DSCN MKR DOCD: CPT | Performed by: INTERNAL MEDICINE

## 2025-05-08 PROCEDURE — 3074F SYST BP LT 130 MM HG: CPT | Performed by: INTERNAL MEDICINE

## 2025-05-08 PROCEDURE — 99214 OFFICE O/P EST MOD 30 MIN: CPT | Performed by: INTERNAL MEDICINE

## 2025-05-08 NOTE — PROGRESS NOTES
Karen Referred for sob on exertion and abn stress tets  Abnormal stress test f/u.          6 month follow up.    EKG done 9-.    Denies chest pain     Occasional Palpitation better after toprol xl    Lost 5 lb    Dizziness on standing better after better hydration     NO MORE CHEST PAIN for a while    Sob on exertion  chronic and stable- better    Chronic stable Occasional dizziness on standing- BETTER AFTER HYDRATION- 2 BOTTLE OF WATER     Hx of Leg edema none but  now resolved only puffiness  AFTER MAXZIDE THE LEG EDEMA BETTER AND SOB BETTER  hX OF Occasional atypical cp for a while FOR WHICH HAD CATH AND NONOBSTRUCTIVE   Hx of atypical last few seconds    Nonsmoker    Fhx    fATHER   HAD cva/tia, cabg IN HIS 70'S      Past Surgical History:   Procedure Laterality Date    ACHILLES TENDON SURGERY Left 01/12/2022    EXCISION OF POSTERIOR CALCANEAL HEEL SPUR LEFT HEEL, REATTACHMENT OF ACHILLES TENDON ON LEFT WITH SPIDER PLATE AND SCREW performed by Sher Lee DPM at [facility] OR    ENDOMETRIAL ABLATION      FOOT SURGERY      rt foot    TOTAL KNEE ARTHROPLASTY Right 12/20/2024    TUBAL LIGATION  03/1988       Allergies   Allergen Reactions    Sulfa Antibiotics Swelling     Feet swelled        Family History   Problem Relation Age of Onset    Arthritis Mother     Stroke Mother     Heart Disease Mother     Diabetes Father     Stroke Father     High Blood Pressure Father     Heart Disease Father     Breast Cancer Sister 27    Amyotrophic lateral sclerosis Brother         Social History     Socioeconomic History    Marital status:      Spouse name: Not on file    Number of children: 4    Years of education: 12    Highest education level: High school graduate   Occupational History    Not on file   Tobacco Use    Smoking status: Never     Passive exposure: Past    Smokeless tobacco: Never   Vaping Use    Vaping status: Never Used   Substance and Sexual Activity    Alcohol use: Not

## 2025-05-13 RX ORDER — METOPROLOL SUCCINATE 25 MG/1
25 TABLET, EXTENDED RELEASE ORAL DAILY
Qty: 90 TABLET | Refills: 1 | Status: SHIPPED | OUTPATIENT
Start: 2025-05-13

## 2025-06-03 ENCOUNTER — TELEPHONE (OUTPATIENT)
Dept: FAMILY MEDICINE CLINIC | Age: 65
End: 2025-06-03

## 2025-06-03 RX ORDER — AMOXICILLIN 500 MG/1
CAPSULE ORAL
Qty: 4 CAPSULE | Refills: 0 | Status: SHIPPED | OUTPATIENT
Start: 2025-06-03

## 2025-06-03 NOTE — TELEPHONE ENCOUNTER
Had total knee replacement last year. Is having dental work to be completed. Believes she has had a filling fall out. Is having pain.  Was told to call PCP for antibiotic prior to treatment. Patient is using [facility]. Please advise.

## 2025-07-01 RX ORDER — TRIAMTERENE AND HYDROCHLOROTHIAZIDE 37.5; 25 MG/1; MG/1
TABLET ORAL
Qty: 45 TABLET | Refills: 1 | Status: SHIPPED | OUTPATIENT
Start: 2025-07-01

## 2025-07-15 ENCOUNTER — HOSPITAL ENCOUNTER (EMERGENCY)
Age: 65
Discharge: HOME | End: 2025-07-15
Payer: COMMERCIAL

## 2025-07-15 VITALS
DIASTOLIC BLOOD PRESSURE: 79 MMHG | SYSTOLIC BLOOD PRESSURE: 120 MMHG | TEMPERATURE: 98.1 F | RESPIRATION RATE: 18 BRPM | HEART RATE: 76 BPM | OXYGEN SATURATION: 96 %

## 2025-07-15 DIAGNOSIS — R21 RASH AND OTHER NONSPECIFIC SKIN ERUPTION: Primary | ICD-10-CM

## 2025-07-15 DIAGNOSIS — L25.5 PLANT DERMATITIS: ICD-10-CM

## 2025-07-15 PROCEDURE — 99213 OFFICE O/P EST LOW 20 MIN: CPT | Performed by: NURSE PRACTITIONER

## 2025-07-15 PROCEDURE — 96372 THER/PROPH/DIAG INJ SC/IM: CPT

## 2025-07-15 PROCEDURE — 6360000002 HC RX W HCPCS: Performed by: NURSE PRACTITIONER

## 2025-07-15 PROCEDURE — 99213 OFFICE O/P EST LOW 20 MIN: CPT

## 2025-07-15 RX ORDER — METHYLPREDNISOLONE ACETATE 80 MG/ML
80 INJECTION, SUSPENSION INTRA-ARTICULAR; INTRALESIONAL; INTRAMUSCULAR; SOFT TISSUE ONCE
Status: COMPLETED | OUTPATIENT
Start: 2025-07-15 | End: 2025-07-15

## 2025-07-15 RX ADMIN — METHYLPREDNISOLONE ACETATE 80 MG: 80 INJECTION, SUSPENSION INTRA-ARTICULAR; INTRALESIONAL; INTRAMUSCULAR; SOFT TISSUE at 12:01

## 2025-07-15 ASSESSMENT — PAIN - FUNCTIONAL ASSESSMENT: PAIN_FUNCTIONAL_ASSESSMENT: NONE - DENIES PAIN

## 2025-07-15 NOTE — ED PROVIDER NOTES
[facility] URGENT CARE  UrgentCare Encounter      CHIEFCOMPLAINT       Chief Complaint   Patient presents with    Rash       Nurses Notes reviewed and I agree except as noted in the HPI.  HISTORY OF PRESENT ILLNESS     So Plata is a 65 y.o. female who presents to the urgent care for evaluation of a rash on her arms and face that started after pulling weeds in the yard Sunday.  Rash appeared Monday.  She has been using topical Benadryl cream.  She thinks the bead was [place]. Itches and spreading.     The patient/patient representative has no other acute complaints at this time.    REVIEW OF SYSTEMS     Review of Systems   Skin:  Positive for rash.   All other systems reviewed and are negative.      PAST MEDICAL HISTORY         Diagnosis Date    COPD (chronic obstructive pulmonary disease) ([facility])     GERD (gastroesophageal reflux disease) 02/05/2013    Hypertension 06/01/2022    At [facility] for insurance    Obesity     Restless legs syndrome     Unspecified sleep apnea     no CPAP       SURGICAL HISTORY     Patient  has a past surgical history that includes Tubal ligation (03/1988); Foot surgery; Endometrial ablation; Achilles tendon surgery (Left, 01/12/2022); and Total knee arthroplasty (Right, 12/20/2024).    CURRENT MEDICATIONS       Previous Medications    ALBUTEROL SULFATE HFA (VENTOLIN HFA) 108 (90 BASE) MCG/ACT INHALER    Inhale 2 puffs into the lungs 4 times daily as needed for Wheezing or Shortness of Breath    ASPIRIN EC 81 MG EC TABLET    Take 1 tablet by mouth daily    BUDESONIDE-FORMOTEROL (SYMBICORT) 80-4.5 MCG/ACT AERO    Inhale 2 puffs into the lungs in the morning and 2 puffs in the evening. Rinse mouth after use..    DULOXETINE (CYMBALTA) 30 MG EXTENDED RELEASE CAPSULE    Take 1 capsule by mouth daily    GABAPENTIN (NEURONTIN) 300 MG CAPSULE        METOPROLOL SUCCINATE (TOPROL XL) 25 MG EXTENDED RELEASE TABLET    TAKE ONE TABLET BY MOUTH ONCE A DAY    MULTIPLE VITAMINS-MINERALS

## 2025-07-21 ENCOUNTER — OFFICE VISIT (OUTPATIENT)
Dept: FAMILY MEDICINE CLINIC | Age: 65
End: 2025-07-21
Payer: COMMERCIAL

## 2025-07-21 VITALS
SYSTOLIC BLOOD PRESSURE: 116 MMHG | HEART RATE: 74 BPM | WEIGHT: 246.6 LBS | DIASTOLIC BLOOD PRESSURE: 74 MMHG | HEIGHT: 63 IN | RESPIRATION RATE: 16 BRPM | TEMPERATURE: 97.6 F | BODY MASS INDEX: 43.7 KG/M2

## 2025-07-21 DIAGNOSIS — J44.9 CHRONIC OBSTRUCTIVE PULMONARY DISEASE, UNSPECIFIED COPD TYPE (HCC): ICD-10-CM

## 2025-07-21 DIAGNOSIS — R73.01 IFG (IMPAIRED FASTING GLUCOSE): ICD-10-CM

## 2025-07-21 DIAGNOSIS — L25.5 RHUS DERMATITIS: Primary | ICD-10-CM

## 2025-07-21 PROBLEM — E11.9 TYPE 2 DIABETES MELLITUS WITHOUT COMPLICATION, WITHOUT LONG-TERM CURRENT USE OF INSULIN (HCC): Status: ACTIVE | Noted: 2025-07-21

## 2025-07-21 PROBLEM — E11.9 TYPE 2 DIABETES MELLITUS WITHOUT COMPLICATION, WITHOUT LONG-TERM CURRENT USE OF INSULIN (HCC): Status: RESOLVED | Noted: 2025-07-21 | Resolved: 2025-07-21

## 2025-07-21 PROCEDURE — 99213 OFFICE O/P EST LOW 20 MIN: CPT | Performed by: NURSE PRACTITIONER

## 2025-07-21 PROCEDURE — 1123F ACP DISCUSS/DSCN MKR DOCD: CPT | Performed by: NURSE PRACTITIONER

## 2025-07-21 PROCEDURE — 3078F DIAST BP <80 MM HG: CPT | Performed by: NURSE PRACTITIONER

## 2025-07-21 PROCEDURE — 3074F SYST BP LT 130 MM HG: CPT | Performed by: NURSE PRACTITIONER

## 2025-07-21 RX ORDER — FLUOCINONIDE 0.5 MG/G
CREAM TOPICAL
Qty: 60 G | Refills: 0 | Status: SHIPPED | OUTPATIENT
Start: 2025-07-21

## 2025-07-21 RX ORDER — PREDNISONE 20 MG/1
TABLET ORAL
Qty: 19 TABLET | Refills: 0 | Status: SHIPPED | OUTPATIENT
Start: 2025-07-21

## 2025-07-21 SDOH — ECONOMIC STABILITY: FOOD INSECURITY: WITHIN THE PAST 12 MONTHS, THE FOOD YOU BOUGHT JUST DIDN'T LAST AND YOU DIDN'T HAVE MONEY TO GET MORE.: NEVER TRUE

## 2025-07-21 SDOH — ECONOMIC STABILITY: FOOD INSECURITY: WITHIN THE PAST 12 MONTHS, YOU WORRIED THAT YOUR FOOD WOULD RUN OUT BEFORE YOU GOT MONEY TO BUY MORE.: NEVER TRUE

## 2025-07-21 ASSESSMENT — ENCOUNTER SYMPTOMS
NAUSEA: 0
SHORTNESS OF BREATH: 0
ABDOMINAL PAIN: 0
COUGH: 0

## 2025-07-21 ASSESSMENT — PATIENT HEALTH QUESTIONNAIRE - PHQ9
SUM OF ALL RESPONSES TO PHQ QUESTIONS 1-9: 12
6. FEELING BAD ABOUT YOURSELF - OR THAT YOU ARE A FAILURE OR HAVE LET YOURSELF OR YOUR FAMILY DOWN: NOT AT ALL
2. FEELING DOWN, DEPRESSED OR HOPELESS: NEARLY EVERY DAY
4. FEELING TIRED OR HAVING LITTLE ENERGY: NEARLY EVERY DAY
10. IF YOU CHECKED OFF ANY PROBLEMS, HOW DIFFICULT HAVE THESE PROBLEMS MADE IT FOR YOU TO DO YOUR WORK, TAKE CARE OF THINGS AT HOME, OR GET ALONG WITH OTHER PEOPLE: NOT DIFFICULT AT ALL
SUM OF ALL RESPONSES TO PHQ QUESTIONS 1-9: 12
SUM OF ALL RESPONSES TO PHQ QUESTIONS 1-9: 12
3. TROUBLE FALLING OR STAYING ASLEEP: NEARLY EVERY DAY
SUM OF ALL RESPONSES TO PHQ QUESTIONS 1-9: 12
1. LITTLE INTEREST OR PLEASURE IN DOING THINGS: NOT AT ALL
8. MOVING OR SPEAKING SO SLOWLY THAT OTHER PEOPLE COULD HAVE NOTICED. OR THE OPPOSITE, BEING SO FIGETY OR RESTLESS THAT YOU HAVE BEEN MOVING AROUND A LOT MORE THAN USUAL: NOT AT ALL
5. POOR APPETITE OR OVEREATING: NOT AT ALL
9. THOUGHTS THAT YOU WOULD BE BETTER OFF DEAD, OR OF HURTING YOURSELF: NOT AT ALL
7. TROUBLE CONCENTRATING ON THINGS, SUCH AS READING THE NEWSPAPER OR WATCHING TELEVISION: NEARLY EVERY DAY

## 2025-07-21 NOTE — PROGRESS NOTES
So Plata (1960) 65 y.o. female here for evaluation of the following chief complaint(s):      HPI:  Chief Complaint   Patient presents with    Rash     Patient has been experiencing a rash on her upper body for the past week. Patient states that she got a shot last week at urgent care and the rash got worse.    Other     Discuss glucose readings       Seen at  7/15/25 for rash.  Given DEPO 80 mg IM with no improvement.  Rash on arms, torso and face.  Itching.     Vitals:    07/21/25 0937   BP: 116/74   Pulse: 74   Resp: 16   Temp: 97.6 °F (36.4 °C)       In a program in which she has a CGM.   Last glucose within lab work has been above 100.  Since steroid shot her sugars have been elevated.  Family hx of DM.  No recent A1C    Patient Active Problem List   Diagnosis    GERD (gastroesophageal reflux disease)    Adjustment disorder with depressed mood    MISHA (obstructive sleep apnea)    hx of Chest pain, atypical- noncardiac     Abnormal EKG    SOB (shortness of breath) on exertion    Morbid obesity due to excess calories (HCC)    S/P cardiac cath-8/26/22-LM-P, LAD MID 30%, LCX-P, RCA PROX 35% ECCENTRIC PLAQUE, EDP 21 MMHG, EF 50 TO 55%- MED RX    Dizziness on standing    Bilateral leg edema +1  NOW AND NONE    Chronic obstructive pulmonary disease, unspecified    Sinus tachycardia    Intermittent palpitations    Body fluid retention    Arthritis    Primary hypertension       SUBJECTIVE/OBJECTIVE:  Review of Systems   Constitutional:  Negative for chills and fever.   HENT: Negative.     Respiratory:  Negative for cough and shortness of breath.    Cardiovascular:  Negative for chest pain.   Gastrointestinal:  Negative for abdominal pain and nausea.   Skin:  Positive for rash.   Neurological:  Negative for dizziness, light-headedness and headaches.   Psychiatric/Behavioral: Negative.         Physical Exam  HENT:      Head: Normocephalic.      Right Ear: Tympanic membrane normal.      Left Ear: Tympanic

## 2025-07-29 DIAGNOSIS — F33.0 MILD EPISODE OF RECURRENT MAJOR DEPRESSIVE DISORDER: ICD-10-CM

## 2025-07-29 DIAGNOSIS — M54.10 RADICULITIS OF LEG: ICD-10-CM

## 2025-07-29 RX ORDER — DULOXETIN HYDROCHLORIDE 30 MG/1
30 CAPSULE, DELAYED RELEASE ORAL DAILY
Qty: 90 CAPSULE | Refills: 3 | Status: SHIPPED | OUTPATIENT
Start: 2025-07-29

## 2025-08-21 ENCOUNTER — TELEPHONE (OUTPATIENT)
Dept: FAMILY MEDICINE CLINIC | Age: 65
End: 2025-08-21

## (undated) DEVICE — STRIP SKIN CLSR W0.25XL4IN WHT SPUNBOUND FBR NYL HI ADH

## (undated) DEVICE — 3M™ STERI-STRIP™ COMPOUND BENZOIN TINCTURE 40 BAGS/CARTON 4 CARTONS/CASE C1544: Brand: 3M™ STERI-STRIP™

## (undated) DEVICE — PREP SOL PVP IODINE 4%  4 OZ/BTL

## (undated) DEVICE — SOLUTION IRRIG 1500ML STRL H2O USP POUR PLAS BTL

## (undated) DEVICE — SOLUTION IV IRRIG POUR BRL 0.9% SODIUM CHL 2F7124

## (undated) DEVICE — SUTURE PROL SZ 4-0 L18IN NONABSORBABLE BLU L19MM PS-2 3/8 8682G

## (undated) DEVICE — SUTURE VCRL SZ 3-0 L27IN ABSRB UD FS-2 L19MM 1/2 CIR J423H

## (undated) DEVICE — SOLUTION SURG PREP POV IOD 7.5% 4 OZ

## (undated) DEVICE — PACK PROCEDURE SURG POD SC SRHP LF

## (undated) DEVICE — NEEDLE HYPO 25GA L1IN PIVOTING SHLD FOR LUERLOCK SYR ECLIPSE

## (undated) DEVICE — IMPREGNATED GAUZE DRESSING: Brand: CUTICERIN 7.5X7.5CM CTN 50

## (undated) DEVICE — DRILL BIT AO FITTING

## (undated) DEVICE — GLOVE BIOGEL POWDER FREE SZ 8

## (undated) DEVICE — Device

## (undated) DEVICE — Z DISCONTINUED BY MEDLINE USE 2711682 TRAY SKIN PREP DRY W/ PREM GLV

## (undated) DEVICE — Z DISCONTINUED USE 2275686 GLOVE SURG SZ 8 L12IN FNGR THK13MIL WHT ISOLEX POLYISOPRENE